# Patient Record
Sex: FEMALE | Race: WHITE | NOT HISPANIC OR LATINO | Employment: UNEMPLOYED | URBAN - METROPOLITAN AREA
[De-identification: names, ages, dates, MRNs, and addresses within clinical notes are randomized per-mention and may not be internally consistent; named-entity substitution may affect disease eponyms.]

---

## 2018-11-29 PROBLEM — S68.119A AMPUTATION OF FINGER: Status: ACTIVE | Noted: 2018-11-29

## 2021-02-03 ENCOUNTER — LAB VISIT (OUTPATIENT)
Dept: LAB | Facility: HOSPITAL | Age: 71
End: 2021-02-03
Attending: EMERGENCY MEDICINE
Payer: MEDICARE

## 2021-02-03 DIAGNOSIS — R53.83 FATIGUE: Primary | ICD-10-CM

## 2021-02-03 DIAGNOSIS — E78.5 HYPERLIPEMIA: ICD-10-CM

## 2021-02-03 DIAGNOSIS — Z79.899 ENCOUNTER FOR LONG-TERM (CURRENT) USE OF OTHER MEDICATIONS: ICD-10-CM

## 2021-02-03 DIAGNOSIS — U07.1 CLINICAL DIAGNOSIS OF SEVERE ACUTE RESPIRATORY SYNDROME CORONAVIRUS 2 (SARS-COV-2) DISEASE: ICD-10-CM

## 2021-02-03 DIAGNOSIS — E66.8 OBESITY OF ENDOCRINE ORIGIN: ICD-10-CM

## 2021-02-03 LAB
BASOPHILS # BLD AUTO: 0.04 K/UL (ref 0–0.2)
BASOPHILS NFR BLD: 0.7 % (ref 0–1.9)
BILIRUB UR QL STRIP: NEGATIVE
CLARITY UR: CLEAR
COLOR UR: YELLOW
DIFFERENTIAL METHOD: ABNORMAL
EOSINOPHIL # BLD AUTO: 0.2 K/UL (ref 0–0.5)
EOSINOPHIL NFR BLD: 3.1 % (ref 0–8)
ERYTHROCYTE [DISTWIDTH] IN BLOOD BY AUTOMATED COUNT: 12 % (ref 11.5–14.5)
ERYTHROCYTE [SEDIMENTATION RATE] IN BLOOD BY WESTERGREN METHOD: 11 MM/HR (ref 0–20)
GLUCOSE UR QL STRIP: NEGATIVE
HCT VFR BLD AUTO: 44.7 % (ref 37–48.5)
HGB BLD-MCNC: 14 G/DL (ref 12–16)
HGB UR QL STRIP: ABNORMAL
IMM GRANULOCYTES # BLD AUTO: 0.01 K/UL (ref 0–0.04)
IMM GRANULOCYTES NFR BLD AUTO: 0.2 % (ref 0–0.5)
KETONES UR QL STRIP: ABNORMAL
LEUKOCYTE ESTERASE UR QL STRIP: NEGATIVE
LYMPHOCYTES # BLD AUTO: 2.6 K/UL (ref 1–4.8)
LYMPHOCYTES NFR BLD: 42.4 % (ref 18–48)
MCH RBC QN AUTO: 30.1 PG (ref 27–31)
MCHC RBC AUTO-ENTMCNC: 31.3 G/DL (ref 32–36)
MCV RBC AUTO: 96 FL (ref 82–98)
MONOCYTES # BLD AUTO: 0.5 K/UL (ref 0.3–1)
MONOCYTES NFR BLD: 8.6 % (ref 4–15)
NEUTROPHILS # BLD AUTO: 2.8 K/UL (ref 1.8–7.7)
NEUTROPHILS NFR BLD: 45 % (ref 38–73)
NITRITE UR QL STRIP: NEGATIVE
NRBC BLD-RTO: 0 /100 WBC
PH UR STRIP: 6 [PH] (ref 5–8)
PLATELET # BLD AUTO: 203 K/UL (ref 150–350)
PMV BLD AUTO: 10.5 FL (ref 9.2–12.9)
PROT UR QL STRIP: NEGATIVE
RBC # BLD AUTO: 4.65 M/UL (ref 4–5.4)
SP GR UR STRIP: >=1.03 (ref 1–1.03)
URN SPEC COLLECT METH UR: ABNORMAL
WBC # BLD AUTO: 6.13 K/UL (ref 3.9–12.7)

## 2021-02-03 PROCEDURE — 84403 ASSAY OF TOTAL TESTOSTERONE: CPT

## 2021-02-03 PROCEDURE — 82306 VITAMIN D 25 HYDROXY: CPT

## 2021-02-03 PROCEDURE — 85651 RBC SED RATE NONAUTOMATED: CPT | Mod: PO

## 2021-02-03 PROCEDURE — 84140 ASSAY OF PREGNENOLONE: CPT

## 2021-02-03 PROCEDURE — 80061 LIPID PANEL: CPT

## 2021-02-03 PROCEDURE — 84482 T3 REVERSE: CPT

## 2021-02-03 PROCEDURE — 85025 COMPLETE CBC W/AUTO DIFF WBC: CPT

## 2021-02-03 PROCEDURE — 83036 HEMOGLOBIN GLYCOSYLATED A1C: CPT

## 2021-02-03 PROCEDURE — 82626 DEHYDROEPIANDROSTERONE: CPT

## 2021-02-03 PROCEDURE — 84443 ASSAY THYROID STIM HORMONE: CPT

## 2021-02-03 PROCEDURE — 82670 ASSAY OF TOTAL ESTRADIOL: CPT

## 2021-02-03 PROCEDURE — 83540 ASSAY OF IRON: CPT

## 2021-02-03 PROCEDURE — 83090 ASSAY OF HOMOCYSTEINE: CPT

## 2021-02-03 PROCEDURE — 82533 TOTAL CORTISOL: CPT

## 2021-02-03 PROCEDURE — 82607 VITAMIN B-12: CPT

## 2021-02-03 PROCEDURE — 83525 ASSAY OF INSULIN: CPT

## 2021-02-03 PROCEDURE — 84144 ASSAY OF PROGESTERONE: CPT

## 2021-02-03 PROCEDURE — 80053 COMPREHEN METABOLIC PANEL: CPT

## 2021-02-03 PROCEDURE — 83002 ASSAY OF GONADOTROPIN (LH): CPT

## 2021-02-03 PROCEDURE — 82679 ASSAY OF ESTRONE: CPT

## 2021-02-03 PROCEDURE — 82746 ASSAY OF FOLIC ACID SERUM: CPT

## 2021-02-03 PROCEDURE — 82728 ASSAY OF FERRITIN: CPT

## 2021-02-03 PROCEDURE — 86800 THYROGLOBULIN ANTIBODY: CPT

## 2021-02-03 PROCEDURE — 83735 ASSAY OF MAGNESIUM: CPT

## 2021-02-03 PROCEDURE — 84439 ASSAY OF FREE THYROXINE: CPT

## 2021-02-03 PROCEDURE — 83001 ASSAY OF GONADOTROPIN (FSH): CPT

## 2021-02-03 PROCEDURE — 86769 SARS-COV-2 COVID-19 ANTIBODY: CPT

## 2021-02-03 PROCEDURE — 84270 ASSAY OF SEX HORMONE GLOBUL: CPT

## 2021-02-03 PROCEDURE — 84481 FREE ASSAY (FT-3): CPT

## 2021-02-03 PROCEDURE — 81003 URINALYSIS AUTO W/O SCOPE: CPT | Mod: PO

## 2021-02-03 PROCEDURE — 84100 ASSAY OF PHOSPHORUS: CPT

## 2021-02-03 PROCEDURE — 86140 C-REACTIVE PROTEIN: CPT

## 2021-02-03 PROCEDURE — 84402 ASSAY OF FREE TESTOSTERONE: CPT

## 2021-02-03 PROCEDURE — 84146 ASSAY OF PROLACTIN: CPT

## 2021-02-03 PROCEDURE — 36415 COLL VENOUS BLD VENIPUNCTURE: CPT | Mod: PO

## 2021-02-04 LAB
25(OH)D3+25(OH)D2 SERPL-MCNC: 26 NG/ML (ref 30–96)
ALBUMIN SERPL BCP-MCNC: 4.1 G/DL (ref 3.5–5.2)
ALP SERPL-CCNC: 83 U/L (ref 55–135)
ALT SERPL W/O P-5'-P-CCNC: 138 U/L (ref 10–44)
ANION GAP SERPL CALC-SCNC: 11 MMOL/L (ref 8–16)
AST SERPL-CCNC: 61 U/L (ref 10–40)
BILIRUB SERPL-MCNC: 0.6 MG/DL (ref 0.1–1)
BUN SERPL-MCNC: 24 MG/DL (ref 8–23)
CALCIUM SERPL-MCNC: 9.1 MG/DL (ref 8.7–10.5)
CHLORIDE SERPL-SCNC: 104 MMOL/L (ref 95–110)
CHOLEST SERPL-MCNC: 318 MG/DL (ref 120–199)
CHOLEST/HDLC SERPL: 3.8 {RATIO} (ref 2–5)
CO2 SERPL-SCNC: 24 MMOL/L (ref 23–29)
CORTIS SERPL-MCNC: 11.1 UG/DL
CREAT SERPL-MCNC: 0.8 MG/DL (ref 0.5–1.4)
CRP SERPL-MCNC: 2.1 MG/L (ref 0–8.2)
EST. GFR  (AFRICAN AMERICAN): >60 ML/MIN/1.73 M^2
EST. GFR  (NON AFRICAN AMERICAN): >60 ML/MIN/1.73 M^2
ESTIMATED AVG GLUCOSE: 120 MG/DL (ref 68–131)
ESTRADIOL SERPL-MCNC: <10 PG/ML
FOLATE SERPL-MCNC: 14.6 NG/ML (ref 4–24)
GLUCOSE SERPL-MCNC: 100 MG/DL (ref 70–110)
HBA1C MFR BLD: 5.8 % (ref 4–5.6)
HCYS SERPL-SCNC: 7.2 UMOL/L (ref 4–15.5)
HDLC SERPL-MCNC: 83 MG/DL (ref 40–75)
HDLC SERPL: 26.1 % (ref 20–50)
INSULIN COLLECTION INTERVAL: NORMAL
INSULIN SERPL-ACNC: 9.3 UU/ML
LDLC SERPL CALC-MCNC: 199.8 MG/DL (ref 63–159)
LH SERPL-ACNC: 21.3 MIU/ML
MAGNESIUM SERPL-MCNC: 2 MG/DL (ref 1.6–2.6)
NONHDLC SERPL-MCNC: 235 MG/DL
PHOSPHATE SERPL-MCNC: 3.4 MG/DL (ref 2.7–4.5)
POTASSIUM SERPL-SCNC: 4.2 MMOL/L (ref 3.5–5.1)
PROGEST SERPL-MCNC: 0.1 NG/ML
PROLACTIN SERPL IA-MCNC: 5.3 NG/ML (ref 5.2–26.5)
PROT SERPL-MCNC: 7.5 G/DL (ref 6–8.4)
SARS-COV-2 IGG SERPLBLD QL IA.RAPID: NEGATIVE
SODIUM SERPL-SCNC: 139 MMOL/L (ref 136–145)
T3FREE SERPL-MCNC: 2.8 PG/ML (ref 2.3–4.2)
T4 FREE SERPL-MCNC: 0.98 NG/DL (ref 0.71–1.51)
TESTOST SERPL-MCNC: 53 NG/DL (ref 5–73)
THYROGLOB AB SERPL IA-ACNC: <4 IU/ML (ref 0–3.9)
THYROPEROXIDASE IGG SERPL-ACNC: <6 IU/ML
TRIGL SERPL-MCNC: 176 MG/DL (ref 30–150)
TSH SERPL DL<=0.005 MIU/L-ACNC: 3.4 UIU/ML (ref 0.4–4)
VIT B12 SERPL-MCNC: 295 PG/ML (ref 210–950)

## 2021-02-05 LAB
FERRITIN SERPL-MCNC: 149 NG/ML (ref 20–300)
FSH SERPL-ACNC: 99.7 MIU/ML
IRON SERPL-MCNC: 114 UG/DL (ref 30–160)

## 2021-02-06 LAB — ESTRONE SERPL-MCNC: 33 PG/ML

## 2021-02-07 LAB — DHEA SERPL-MCNC: 1.96 NG/ML (ref 0.63–4.7)

## 2021-02-08 LAB — SHBG SERPL-SCNC: 45 NMOL/L

## 2021-02-09 LAB
PREG SERPL-MCNC: 66 NG/DL (ref 33–248)
T3REVERSE SERPL-MCNC: 15.3 NG/DL (ref 9–27)
TESTOST FREE SERPL-MCNC: 1.2 PG/ML

## 2023-02-17 DIAGNOSIS — D05.12 DUCTAL CARCINOMA IN SITU (DCIS) OF LEFT BREAST: Primary | ICD-10-CM

## 2023-02-22 ENCOUNTER — TELEPHONE (OUTPATIENT)
Dept: SURGERY | Facility: CLINIC | Age: 73
End: 2023-02-22
Payer: MEDICARE

## 2023-02-22 NOTE — NURSING
Called the patient and introduced myself and told her about navigation services.  Told her about genetic testing and asked her if she is interested.  She definitely is interested and will come by tomorrow before her breast mri.  Instructed her to not eat or drink anything for 30 minutes before giving us the sample.  She verbalized understanding.

## 2023-02-23 NOTE — PROGRESS NOTES
[facility] - Breast Surgery   History and Physical    Subjective:      Margaret Rouse is a 72 y.o. female     Pt was not having any sxs. Was found on scr MMG.    No longer on plavix, only took for a few days, last year was the last time 21  HRT pellets: Testosterone started 2021, last placed 2023  On prometrium  Will stop all HRT      No tobacco use anymore, understands the importance of no tobacco.         Menarche at age 14yrs old. . Age at first live birth 25yrs old. did breast feed for 12 months each.   Menopause at 55yrs old. HRT-Prometrium and testosterone pellets. She has been on hormone pellets since 21, last placed 2023     No personal history of ovarian/breast. Denies previous breast biopsy. Genetic testing- collected last week    Family history cancer: Sister with Follicular Lymphoma, Maternal Uncle with Pancreatic, 2 Maternal male cousins with Colon    Smoker Pk/yr quit date: Occasionally over the last few years when they go out  Covid-19 vaccine x 2    Relevant medical history:  2021  Left heart cath with Dr. Castrejon EF 65%, nonobstructive CAD, no wall motion abn    Relevant info: Had breast cyst drained 13 years ago, can not remember which breast.  Srinivasa attending        Patient Active Problem List   Diagnosis    Palpitations    Amputation of finger    Invasive ductal carcinoma of left breast in female     Current Outpatient Medications on File Prior to Visit   Medication Sig Dispense Refill    ARMOUR THYROID 30 mg Tab once daily.      aspirin (ECOTRIN) 81 MG EC tablet Take 81 mg by mouth once daily.      cyanocobalamin, vitamin B-12, 3,000 mcg Cap Take by mouth once daily. Triple Blend with folate 680mcg      DIINDOLYLMETHANE, BULK, MISC 100 mg by Misc.(Non-Drug; Combo Route) route once daily.      fluticasone propionate (FLONASE) 50 mcg/actuation nasal spray 1 spray by Each Nostril route once daily.      KRILL OIL ORAL Take 2,000 mg by  mouth once daily.      losartan (COZAAR) 25 MG tablet once daily.      metFORMIN (GLUCOPHAGE) 500 MG tablet Take 500 mg by mouth 2 (two) times daily.      progesterone (PROMETRIUM) 200 MG capsule Take 200 mg by mouth once daily.      rosuvastatin (CRESTOR) 20 MG tablet every evening.      VITAMIN A ORAL Take 5,000 Int'l Units/L by mouth once daily.      vitamin K2 45 mcg Cap Take by mouth once daily.      zinc gluconate 50 mg tablet Take 25 mg by mouth once daily.      diazePAM (VALIUM) 5 MG tablet Take 1 tablet (5 mg total) by mouth once. for 1 dose 2 tablet 0    [DISCONTINUED] clopidogreL (PLAVIX) 75 mg tablet once daily.      [DISCONTINUED] MILK THISTLE ORAL Take 7,500 mcg by mouth once daily.       No current facility-administered medications on file prior to visit.     Review of patient's allergies indicates:  No Known Allergies  Past Medical History:   Diagnosis Date    Abnormal results of liver function studies     Chest pain     Fatigue     H/O: pneumonia     History of chicken pox     Hyperlipidemia, mixed     Hypertension     Menopausal syndrome     Palpitations 12/15/2015     Past Surgical History:   Procedure Laterality Date    CATARACT EXTRACTION W/ INTRAOCULAR LENS  IMPLANT, BILATERAL      COLONOSCOPY      COSMETIC SURGERY      DEBRIDEMENT AND CLOSURE OF WOUND OF FINGER Left 11/29/2018    Procedure: DEBRIDEMENT, WOUND, FINGER, WITH CLOSURE, VY FLap;  Surgeon: Frederic Barbour MD;  Location: [facility] OR;  Service: Plastics;  Laterality: Left;    KNEE SURGERY Right 2015    LEFT HEART CATHETERIZATION Left 11/18/2021    Procedure: CATHETERIZATION, HEART, LEFT;  Surgeon: Basim Castrejon MD;  Location: [facility] CATH;  Service: Cardiology;  Laterality: Left;    TONSILLECTOMY  1955     Family History   Problem Relation Age of Onset    No Known Problems Mother     Heart disease Father     Heart attack Father         at age 64    Cancer Sister         folicular non-hodgkins lymphoma    Lymphoma Sister     Diabetes  "Maternal Grandmother      Social History     Socioeconomic History    Marital status:    Tobacco Use    Smoking status: Former    Smokeless tobacco: Never    Tobacco comments:     50 years ago   Substance and Sexual Activity    Alcohol use: No     Comment: rarely    Drug use: No         Review of Systems    No CP, No SOB      Objective:      BP (!) 161/72 (BP Location: Left arm, Patient Position: Sitting, BP Method: Medium (Automatic))   Pulse 93   Temp 99 °F (37.2 °C) (Temporal)   Resp 18   Ht 5' 6" (1.676 m)   Wt 70.2 kg (154 lb 12.2 oz)   SpO2 97%   BMI 24.98 kg/m²     Constitutional: NAD AAOX4  HEENT: EOMI, nonicteric sclera  NECK: no mass, no thyromegaly, no lymphadenopathy  CV: regular rate  PULM: good respiratory effort  ABDOMEN: soft, Nontender, nondistended. No guarding. No rebound.  MUSCULOSKELETAL: Good ROM  SKIN: No rashes or ulcerations seen.   NEUROLOGIC: CN grossly intact. Motor, sensory grossly intact    Breast exam   D considerable ptosis  Right: No mass, discharge, dimpling, lymphadenopathy  Left:  No discharge, lymphadenopathy  Post biopsy, mild bruise  Left UOQ subtle thickness 4cm FN, 3x2cm, puckering at biopsy site  No LN appreciated          Patient Active Problem List   Diagnosis    Palpitations    Amputation of finger    Invasive ductal carcinoma of left breast in female        High complexity of problems addressed - breast cancer, treatment options, expectations, effects of treatment, risks, benefits. Extensive and complex data reviewed, independent interpretation of tests, discussion of management with another health care provider.    Alternative efficacious methods of treatment of breast cancer were given.  Options including lumpectomy, mastectomy, reconstruction options with advantages/disadvantages of each, provisions assuring coverage of reconstructive surgery costs, how the patient may access reconstructive care including the potential to be transferred to a facility " that provides reconstructive care or choosing reconstruction after completion of breast cancer surgery and treatment.  LDH, LCLTF breast cancer brochure given.    I have given her all options - lumpectomy XRT, unilateral or bilateral mastectomy +/- reconstruction. I have explained procedure, risks, benefits, postop expectations. All questions answered. Risks include but are not limited to infection, bleeding, injury to nerves, vessels, numbness, weakness, difficulty lifting arm, swelling of arm (lymphedema), inability to remove all lesion, residual breast tissue, recurrence of malignancy, need for further procedure, loss of skin flap, seroma (fluid collection), inability to find sentinel lymph node, need for axillary dissection, chronic pain, radioactive substance may be given, allergic reaction, staining of the skin, difficulty with future imaging.    Saw in Multi Disciplinary clinic with Dr Hannah medical oncologist and Dr Madrid radiation oncologist.        Imaging and Chronology:     2/4/2022 Bilateral Screening Mammo - [facility]   No mammographic evidence of malignancy.       2/07/2023 Bilateral Screening Mammo - [facility]   Left calcifications at the upper outer posterior position.   Left breast asymmetry at the outer middle position.     Right breast asymmetry at the outer middle position.           2/09/2023 Bilateral Dx Mammo, Left US complete - [facility]   Left Pleomorphic calcifications in the upper outer posterior breast spanning 25 mm on the CC magnification view.  Left smaller group of similar calcifications about 20 mm anterosuperior to the first group spanning 5 mm.    Right UOQ mid asymmetry does not persist    No abn left LN      2/13/2023 Left Dx Mammo, Left Stereotactic Biopsy - [facility]  MERRILL  DCIS, COMEDO TYPE, WITH A 0.1 CM FOCUS OF INVASION  This is malignant and concordant with imaging findings    ER Low Positive 5.4 %  NE Negative 0.1 %  Insufficient tissue for Her 2      2/23/2023 MRI Breast - 2/23/2023 [facility]  Left UOQ  mid-posterior discontiguous, clumped, non mass enhancement in a segmental pattern spanning 31 mm x 12 mm x 29 mm.   12mm from lateral skin, 29mm from pec  There are subcentimeter satellites inferiorly, including 16mm antinf 6mm mass, nipple clear  Right neg  Nml LN    Genetics pending        Assessment/Plan:      Cancer Staging   Invasive ductal carcinoma of left breast in female  Staging form: Breast, AJCC 8th Edition  - Clinical stage from 2/27/2023: cT1mi, cN0, cM0, G3, ER+, TX-, HER2: Not Assessed - Signed by Amanda Mcclellan MD on 2/27/2023    Left UOQ mid post 3.1cm area with subcm satellites  Nml LN, confirmed with rad  Grade 3 DCIS with microinv IDC  ER 5 nearly neg TX neg, unable to get Her2    Case reviewed at tumor conf. No benefit to rebiopsy, solid areas sub cm in size. LN appear neg. Conveyed to pt.     Discussed surgical options, discussed 3cm area with satellite areas.    She wishes to have left mastectomy, left SLNB, recon    Will meet with erica    Discussed NAC does not appear involved by imaging but does have considerable ptosis - options to be discussed with recon team    No signif disease on last cath - cardiac clearance    Has stopped HRT. Will not get additional testosterone pellets  Does not want right mastectomy at this time.     Genetics pending

## 2023-02-27 ENCOUNTER — OFFICE VISIT (OUTPATIENT)
Dept: HEMATOLOGY/ONCOLOGY | Facility: CLINIC | Age: 73
End: 2023-02-27
Payer: MEDICARE

## 2023-02-27 ENCOUNTER — TUMOR BOARD CONFERENCE (OUTPATIENT)
Dept: SURGERY | Facility: CLINIC | Age: 73
End: 2023-02-27

## 2023-02-27 ENCOUNTER — OFFICE VISIT (OUTPATIENT)
Dept: RADIATION ONCOLOGY | Facility: CLINIC | Age: 73
End: 2023-02-27
Payer: MEDICARE

## 2023-02-27 ENCOUNTER — OFFICE VISIT (OUTPATIENT)
Dept: SURGERY | Facility: CLINIC | Age: 73
End: 2023-02-27
Payer: MEDICARE

## 2023-02-27 VITALS
WEIGHT: 154.75 LBS | BODY MASS INDEX: 24.87 KG/M2 | RESPIRATION RATE: 18 BRPM | HEIGHT: 66 IN | HEART RATE: 93 BPM | DIASTOLIC BLOOD PRESSURE: 72 MMHG | TEMPERATURE: 99 F | SYSTOLIC BLOOD PRESSURE: 161 MMHG | OXYGEN SATURATION: 97 %

## 2023-02-27 DIAGNOSIS — D05.12 DUCTAL CARCINOMA IN SITU (DCIS) OF LEFT BREAST: ICD-10-CM

## 2023-02-27 DIAGNOSIS — C50.912 INVASIVE DUCTAL CARCINOMA OF LEFT BREAST IN FEMALE: Primary | ICD-10-CM

## 2023-02-27 DIAGNOSIS — C50.919 INFILTRATING DUCTAL CARCINOMA OF BREAST, UNSPECIFIED LATERALITY: ICD-10-CM

## 2023-02-27 PROCEDURE — 99999 PR PBB SHADOW E&M-EST. PATIENT-LVL V: ICD-10-PCS | Mod: PBBFAC,,, | Performed by: SURGERY

## 2023-02-27 PROCEDURE — 99999 PR PBB SHADOW E&M-EST. PATIENT-LVL II: ICD-10-PCS | Mod: PBBFAC,,, | Performed by: RADIOLOGY

## 2023-02-27 PROCEDURE — 99999 PR PBB SHADOW E&M-EST. PATIENT-LVL V: CPT | Mod: PBBFAC,,, | Performed by: SURGERY

## 2023-02-27 PROCEDURE — 99999 PR PBB SHADOW E&M-EST. PATIENT-LVL II: ICD-10-PCS | Mod: PBBFAC,,, | Performed by: STUDENT IN AN ORGANIZED HEALTH CARE EDUCATION/TRAINING PROGRAM

## 2023-02-27 PROCEDURE — 99205 PR OFFICE/OUTPT VISIT, NEW, LEVL V, 60-74 MIN: ICD-10-PCS | Mod: S$PBB,,, | Performed by: STUDENT IN AN ORGANIZED HEALTH CARE EDUCATION/TRAINING PROGRAM

## 2023-02-27 PROCEDURE — 99999 PR PBB SHADOW E&M-EST. PATIENT-LVL II: CPT | Mod: PBBFAC,,, | Performed by: STUDENT IN AN ORGANIZED HEALTH CARE EDUCATION/TRAINING PROGRAM

## 2023-02-27 PROCEDURE — 99999 PR PBB SHADOW E&M-EST. PATIENT-LVL II: CPT | Mod: PBBFAC,,, | Performed by: RADIOLOGY

## 2023-02-27 PROCEDURE — 99205 OFFICE O/P NEW HI 60 MIN: CPT | Mod: S$PBB,,, | Performed by: STUDENT IN AN ORGANIZED HEALTH CARE EDUCATION/TRAINING PROGRAM

## 2023-02-27 PROCEDURE — 99205 OFFICE O/P NEW HI 60 MIN: CPT | Mod: S$PBB,,, | Performed by: RADIOLOGY

## 2023-02-27 PROCEDURE — 99205 PR OFFICE/OUTPT VISIT, NEW, LEVL V, 60-74 MIN: ICD-10-PCS | Mod: S$PBB,,, | Performed by: RADIOLOGY

## 2023-02-27 PROCEDURE — 99212 OFFICE O/P EST SF 10 MIN: CPT | Mod: PBBFAC,25,27,PN | Performed by: RADIOLOGY

## 2023-02-27 PROCEDURE — 99205 PR OFFICE/OUTPT VISIT, NEW, LEVL V, 60-74 MIN: ICD-10-PCS | Mod: S$PBB,,, | Performed by: SURGERY

## 2023-02-27 PROCEDURE — 99205 OFFICE O/P NEW HI 60 MIN: CPT | Mod: S$PBB,,, | Performed by: SURGERY

## 2023-02-27 PROCEDURE — 99215 OFFICE O/P EST HI 40 MIN: CPT | Mod: PBBFAC,25,PN,27 | Performed by: SURGERY

## 2023-02-27 PROCEDURE — 99212 OFFICE O/P EST SF 10 MIN: CPT | Mod: PBBFAC,27,PN | Performed by: STUDENT IN AN ORGANIZED HEALTH CARE EDUCATION/TRAINING PROGRAM

## 2023-02-27 NOTE — NURSING
Met with the patient to discuss what the patient has learned thus far about her diagnosis and answer all questions.  Follow-up appts made and a folder with NCCN patient guidelines book on Invasive/Noninvasive Breast Cancer given to her along with copies of her imaging, biopsy, and pathology reports. Also given a copy of the [facility] Board of Medical [employer]ers brochure on Introductory Guide to Breast Cancer Treatment Options.  Contact information given to her for nurse navigation and she was told to call for any questions or concerns.  She verbalized understanding.  Consents signed for left mastectomy with left sentinel node biopsy.  Post op instructions given both verbally and written.  She verbalized understanding.  Told her pre op nurse appt information and told her location and phone number to the OPP.  She will call once she has a surgery date.  Referral placed for plastic surgery and Prehab.  All paperwork to be faxed to Dr Bro's and Dr Forman's (if he does surgery on the [facility]).  Message sent to Dr Bro's nurse.

## 2023-02-27 NOTE — PROGRESS NOTES
02/27/2023    [facility]   Radiation Oncology Consultation    ASSESSMENT  This is a 72 y.o. 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 She is seen as part of Multidisciplinary Breast Clinic prior to initiation of therapy for discussion of treatment options.       PLAN    Treatment options were discussed with the patient including mastectomy vs breast conservation with lumpectomy and adjuvant RT.  We discussed the goals of treatment to be curative.  The risks, benefits, scheduling, alternatives to and rationale of radiation therapy were explained in detail.   We discussed the indications, course, and potential risks associated with radiation therapy, including but not limited to fatigue, local skin reaction such as hyperpigmentation, tenderness or erythema, breast pain, and potential long term toxicities such as rib fragility, and remote risks of lung inflammation, esophageal inflammation, heart inflammation, or secondary malignancy.  She expressed understanding of these risks, all questions were answered to her apparent satisfaction.   After this discussion, she elected to proceed with mastectomy  She was given our contact information, and she was told that she could call our clinic at any time if she has any questions or concerns.      Radiation Treatment Details:   No Radiation therapy    Chief Complaint   Patient presents with    Breast Cancer       HPI: The patient is a 72 y.o. year old female with a new diagnosis of breast cancer. She initially presented due to abnormal mammogram.     2/7/23 Screening mammogram:  left calcifications at the UO posterior and asymmetry in the outer region; Right asymmetry in the outer region    2/9/23 Diagnostic mammogram/ultrasound: Right-no presistent abnormality.  Left-new pleomorphic Ca++ in the UO posterior breast  spanning 2.5cm, smaller group of Ca++ 2.0cm anterior to first spanning 5mm    2/13/23 LEFR upper outer posterior biopsy: ductal carcinoma in-situ, comedo type, 0.1cm focus of invasion, ER+/GA-/Her2 unable to assess    2/23/23 MRI Breasts: Left upper outer quadrant posterior depth discontiguous, clumped non-mass enhancement with associated clip 3.1 x 1.2 x 2.9cm with sub-cm satellites inferiorly up to 6mm at 1.6cm inferior.  No abnormal SARAH    Possibility of pregnancy: No  History of prior irradiation: No  History of prior systemic anti-cancer therapy: No  History of collagen vascular disease: No  Implanted electronic device (pacer/defib/nerve stimulator): No      Past Medical History:   Diagnosis Date    Abnormal results of liver function studies     Chest pain     Fatigue     H/O: pneumonia     History of chicken pox     Hyperlipidemia, mixed     Hypertension     Menopausal syndrome     Palpitations 12/15/2015       Past Surgical History:   Procedure Laterality Date    CATARACT EXTRACTION W/ INTRAOCULAR LENS  IMPLANT, BILATERAL      COLONOSCOPY      COSMETIC SURGERY      DEBRIDEMENT AND CLOSURE OF WOUND OF FINGER Left 11/29/2018    Procedure: DEBRIDEMENT, WOUND, FINGER, WITH CLOSURE, VY FLap;  Surgeon: Frederic Barbour MD;  Location: [facility] OR;  Service: Plastics;  Laterality: Left;    KNEE SURGERY Right 2015    LEFT HEART CATHETERIZATION Left 11/18/2021    Procedure: CATHETERIZATION, HEART, LEFT;  Surgeon: Basim Castrejon MD;  Location: [facility] CATH;  Service: Cardiology;  Laterality: Left;    TONSILLECTOMY  1955       Social History     Tobacco Use    Smoking status: Former    Smokeless tobacco: Never    Tobacco comments:     50 years ago   Substance Use Topics    Alcohol use: No     Comment: rarely    Drug use: No       Cancer-related family history includes Cancer in her sister; Lymphoma in her sister.    Current Outpatient Medications on File Prior to Visit   Medication Sig Dispense Refill    ARMOUR THYROID 30 mg  Tab once daily.      aspirin (ECOTRIN) 81 MG EC tablet Take 81 mg by mouth once daily.      cyanocobalamin, vitamin B-12, 3,000 mcg Cap Take by mouth once daily. Triple Blend with folate 680mcg      diazePAM (VALIUM) 5 MG tablet Take 1 tablet (5 mg total) by mouth once. for 1 dose 2 tablet 0    DIINDOLYLMETHANE, BULK, MISC 100 mg by Misc.(Non-Drug; Combo Route) route once daily.      fluticasone propionate (FLONASE) 50 mcg/actuation nasal spray 1 spray by Each Nostril route once daily.      KRILL OIL ORAL Take 2,000 mg by mouth once daily.      losartan (COZAAR) 25 MG tablet once daily.      metFORMIN (GLUCOPHAGE) 500 MG tablet Take 500 mg by mouth 2 (two) times daily.      progesterone (PROMETRIUM) 200 MG capsule Take 200 mg by mouth once daily.      rosuvastatin (CRESTOR) 20 MG tablet every evening.      VITAMIN A ORAL Take 5,000 Int'l Units/L by mouth once daily.      vitamin K2 45 mcg Cap Take by mouth once daily.      zinc gluconate 50 mg tablet Take 25 mg by mouth once daily.      [DISCONTINUED] clopidogreL (PLAVIX) 75 mg tablet once daily.      [DISCONTINUED] MILK THISTLE ORAL Take 7,500 mcg by mouth once daily.       No current facility-administered medications on file prior to visit.       Review of patient's allergies indicates:  No Known Allergies    Review of Systems   Constitutional:  Positive for malaise/fatigue (getting tired more easily).   HENT:  Negative for hearing loss.    Eyes:  Negative for double vision.   Gastrointestinal:  Positive for heartburn (believes 2/2 anxiety).   Musculoskeletal:  Negative for back pain.   Neurological:  Negative for dizziness, sensory change, focal weakness and headaches.   Psychiatric/Behavioral:  Negative for depression. The patient is nervous/anxious.       Vital Signs: There were no vitals taken for this visit.    ECOG Performance Status: 0 - Fully Active    Physical Exam  Vitals reviewed.   Constitutional:       General: She is not in acute distress.      Appearance: Normal appearance. She is not ill-appearing or toxic-appearing.   HENT:      Head: Normocephalic and atraumatic.      Nose: Nose normal.   Eyes:      Extraocular Movements: Extraocular movements intact.      Conjunctiva/sclera: Conjunctivae normal.      Pupils: Pupils are equal, round, and reactive to light.   Pulmonary:      Effort: Pulmonary effort is normal.   Chest:   Breasts:     Right: Normal.       Musculoskeletal:         General: Normal range of motion.      Cervical back: Normal range of motion.   Lymphadenopathy:      Upper Body:      Right upper body: No supraclavicular, axillary or pectoral adenopathy.      Left upper body: No supraclavicular, axillary or pectoral adenopathy.   Skin:     General: Skin is warm.   Neurological:      General: No focal deficit present.      Mental Status: She is alert and oriented to person, place, and time.      Cranial Nerves: No cranial nerve deficit.      Gait: Gait normal.   Psychiatric:         Mood and Affect: Mood normal.         Behavior: Behavior normal.         Thought Content: Thought content normal.         Judgment: Judgment normal.          Imaging: I have personally reviewed the patient's available images and reports and summarized pertinent findings above in HPI.     Pathology: I have personally reviewed the patient's available pathology and summarized pertinent findings above in HPI.     This case was discussed with Dr. Mcclellan      - Thank you for allowing me to participate in the care of your patient.    Aneta Madrid MD

## 2023-02-27 NOTE — NURSING
Cardiac clearance faxed to Dr Mamadou Duque's office.  Called and spoke with Hannah who will give to his nurse.  Spoke with the patient and asked her if she is still on ASA 81mg.  She stopped over a year ago.  She said no doctor prescribed it, just took on her own.  Asked her to not start it right now and also to stop her krill oil for now.  She verbalized understanding.

## 2023-02-27 NOTE — NURSING
Spoke with the patient about lymphedema therapy and why we recommend it.  She is agreeable to an appt with the therapist.  Also spoke with her about Integrative Oncology and the services they provide, as well as classes offered at the Cancer Center.  She is very open to it and said she will call us if she wants to do any of the services.

## 2023-02-28 ENCOUNTER — TELEPHONE (OUTPATIENT)
Dept: HEMATOLOGY/ONCOLOGY | Facility: CLINIC | Age: 73
End: 2023-02-28
Payer: MEDICARE

## 2023-02-28 NOTE — NURSING
Met with patient and her  in multi disciplinary clinic today.  Explained my role as navigator.  Reviewed plan of care and answered questions.  My contact information was provided and pt was encouraged to call with any questions or concerns.  Pt and  verbalized an understanding.

## 2023-02-28 NOTE — PROGRESS NOTES
Interdisciplinary Breast Cancer Conference    Margaret Rouse    Female    Date Presented to Tumor Board: 02/27/23    Presenting Hospital / Clinic: [facility]    Tumor Laterality: Left    Breast Tumor Site: UOQ    Presenter: Amanda Mcclellan    Reason for Consultation: Initial Presentation    Specialties Present: Medical Oncology;Radiation Oncology;Surgical Oncology;Pathology;Navigation;Research;Integrative Oncology;Radiology    Patient Status: a new patient    Treatment to Date: None    Clinical Trial Eligibility: None available    ER: Positive (weakly positive)    MO: Negative         Cancer Staging   Invasive ductal carcinoma of left breast in female  Staging form: Breast, AJCC 8th Edition  - Clinical stage from 2/27/2023: cT1mi, cN0, cM0, G3, ER+, MO-, HER2: Not Assessed - Signed by Amanda Mcclellan MD on 2/27/2023      Recommended Therapy:  Genetic testing  Surgery  Radiation  Endocrine Therapy  Recommendations pending final path    Leaning towards left mastectomy with left sentinel node, wants to discuss with plastic surgeon.       Presentation at Cancer Conference: Prospective

## 2023-03-02 ENCOUNTER — TELEPHONE (OUTPATIENT)
Dept: HEMATOLOGY/ONCOLOGY | Facility: CLINIC | Age: 73
End: 2023-03-02
Payer: MEDICARE

## 2023-03-02 ENCOUNTER — TELEPHONE (OUTPATIENT)
Dept: SURGERY | Facility: CLINIC | Age: 73
End: 2023-03-02
Payer: MEDICARE

## 2023-03-02 ENCOUNTER — PATIENT MESSAGE (OUTPATIENT)
Dept: HEMATOLOGY/ONCOLOGY | Facility: CLINIC | Age: 73
End: 2023-03-02
Payer: MEDICARE

## 2023-03-02 DIAGNOSIS — C50.912 INVASIVE DUCTAL CARCINOMA OF LEFT BREAST IN FEMALE: Primary | ICD-10-CM

## 2023-03-02 NOTE — TELEPHONE ENCOUNTER
Called the patient to let her know that her cardiologist has sent in her clearance for surgery.  She states she has not heard from Dr Bro's office yet for an appt.  Told her we will call them today.  She states she made her appt through [employer] for a lymphedema appt.  Looked at the appt and saw it was made with OT and not a certified lymphedema specialist.  Told her Dr Mcclellan prefers to have her patients see the certified lymphedema therapist.  She was agreeable to us cancelling that appt and having Stefan call her to set up with the correct type therapist.  She will be expecting their call.

## 2023-03-02 NOTE — TELEPHONE ENCOUNTER
I spoke with the patient and informed her of the need to schedule a pre/post op PT appt, recommended by Dr. Mcclellan. I explained what the appt is designed to treat and what the consult will consist of. She voiced understanding and verbalized acceptance of appt. Location was reviewed and message sent to portal if she has any further questions.

## 2023-03-05 PROBLEM — D05.12 DUCTAL CARCINOMA IN SITU (DCIS) OF LEFT BREAST: Status: ACTIVE | Noted: 2023-03-05

## 2023-03-05 NOTE — PROGRESS NOTES
Subjective:                                                                                    Consult note   Patient ID:    Name: Margaret Rouse  : 1950  MRN: [id number]      HPI:   Margaret Rouse is a 72 y.o. female presents for evaluation of Breast Cancer    Patient was seen for a newly diagnosed stage I, breast lesion was seen by a screening mammogram:  23 MRI Breasts: clumped non-mass enhancement with associated clip 3.1 x 1.2 x 2.9cm with sub-cm satellites inferiorly up to 6mm at 1.6cm inferior.  No abnormal SARAH. Biopsy positive for IDC/DCIS, ER; low positive, SD: negative, no HER-2 since was insufficient invasive tumor for IDC and was run on the DCIS part.     Today, patient presented with her family, we discussed that we might need further biopsy to find out the final ER/SD/HER-2 of her invasive part vs possible going to surgery upfront and re-assess afterwards,. Patient denies any symptoms related to her beast.     Oncology History   Invasive ductal carcinoma of left breast in female   2023 Initial Diagnosis    Invasive ductal carcinoma of left breast in female     2023 Cancer Staged    Staging form: Breast, AJCC 8th Edition  - Clinical stage from 2023: cT1mi, cN0, cM0, G3, ER+, SD-, HER2: Not Assessed              Past Medical History:   Diagnosis Date    Abnormal results of liver function studies     Chest pain     Fatigue     H/O: pneumonia     History of chicken pox     Hyperlipidemia, mixed     Hypertension     Menopausal syndrome     Palpitations 12/15/2015       Past Surgical History:   Procedure Laterality Date    CATARACT EXTRACTION W/ INTRAOCULAR LENS  IMPLANT, BILATERAL      COLONOSCOPY      COSMETIC SURGERY      DEBRIDEMENT AND CLOSURE OF WOUND OF FINGER Left 2018    Procedure: DEBRIDEMENT, WOUND, FINGER, WITH CLOSURE, VY FLap;  Surgeon: Frederic Barbour MD;  Location: [facility];  Service: Plastics;  Laterality: Left;    KNEE SURGERY Right      LEFT HEART CATHETERIZATION Left 11/18/2021    Procedure: CATHETERIZATION, HEART, LEFT;  Surgeon: Basim Castrejon MD;  Location: [facility] C[facility];  Service: Cardiology;  Laterality: Left;    TONSILLECTOMY  1955       Family History   Problem Relation Age of Onset    No Known Problems Mother     Heart disease Father     Heart attack Father         at age 64    Cancer Sister         folicular non-hodgkins lymphoma    Lymphoma Sister     Diabetes Maternal Grandmother        Social History     Socioeconomic History    Marital status:    Tobacco Use    Smoking status: Former    Smokeless tobacco: Never    Tobacco comments:     50 years ago   Substance and Sexual Activity    Alcohol use: No     Comment: rarely    Drug use: No       Review of patient's allergies indicates:  No Known Allergies    Review of Systems   Constitutional: Negative for chills, decreased appetite and weight loss.   Eyes:  Negative for visual disturbance.   Cardiovascular:  Negative for chest pain.   Respiratory:  Negative for cough and shortness of breath.    Hematologic/Lymphatic: Negative for adenopathy.   Skin:  Negative for rash.   Musculoskeletal:  Negative for back pain.   Gastrointestinal:  Negative for abdominal pain and diarrhea.   Genitourinary:  Negative for frequency.   Neurological:  Negative for headaches.   Psychiatric/Behavioral:  The patient is nervous/anxious.           Objective:   There were no vitals filed for this visit.     Physical Exam  Constitutional:       Appearance: Normal appearance.   Eyes:      General: No scleral icterus.  Cardiovascular:      Rate and Rhythm: Normal rate.   Pulmonary:      Effort: No respiratory distress.      Breath sounds: Normal breath sounds.   Abdominal:      General: There is no distension.   Musculoskeletal:         General: No swelling.      Cervical back: No rigidity.   Skin:     Coloration: Skin is not jaundiced.   Neurological:      General: No focal deficit present.   Psychiatric:          Behavior: Behavior normal.             Current Outpatient Medications on File Prior to Visit   Medication Sig    ARMOUR THYROID 30 mg Tab once daily.    aspirin (ECOTRIN) 81 MG EC tablet Take 81 mg by mouth once daily.    cyanocobalamin, vitamin B-12, 3,000 mcg Cap Take by mouth once daily. Triple Blend with folate 680mcg    diazePAM (VALIUM) 5 MG tablet Take 1 tablet (5 mg total) by mouth once. for 1 dose    DIINDOLYLMETHANE, BULK, MISC 100 mg by Misc.(Non-Drug; Combo Route) route once daily.    fluticasone propionate (FLONASE) 50 mcg/actuation nasal spray 1 spray by Each Nostril route once daily.    KRILL OIL ORAL Take 2,000 mg by mouth once daily.    losartan (COZAAR) 25 MG tablet once daily.    metFORMIN (GLUCOPHAGE) 500 MG tablet Take 500 mg by mouth 2 (two) times daily.    progesterone (PROMETRIUM) 200 MG capsule Take 200 mg by mouth once daily.    rosuvastatin (CRESTOR) 20 MG tablet every evening.    VITAMIN A ORAL Take 5,000 Int'l Units/L by mouth once daily.    vitamin K2 45 mcg Cap Take by mouth once daily.    zinc gluconate 50 mg tablet Take 25 mg by mouth once daily.     No current facility-administered medications on file prior to visit.       CBC:  Lab Results   Component Value Date    WBC 7.82 07/01/2022    HGB 14.9 07/01/2022    HCT 45.4 07/01/2022    MCV 96 07/01/2022     07/01/2022         CMP:  Sodium   Date Value Ref Range Status   07/01/2022 136 136 - 145 mmol/L Final     Potassium   Date Value Ref Range Status   07/01/2022 4.8 3.5 - 5.1 mmol/L Final     Chloride   Date Value Ref Range Status   07/01/2022 103 95 - 110 mmol/L Final     CO2   Date Value Ref Range Status   07/01/2022 29 22 - 31 mmol/L Final     Glucose   Date Value Ref Range Status   07/01/2022 97 70 - 110 mg/dL Final     Comment:     The ADA recommends the following guidelines for fasting glucose:    Normal:       less than 100 mg/dL    Prediabetes:  100 mg/dL to 125 mg/dL    Diabetes:     126 mg/dL or higher       BUN  "  Date Value Ref Range Status   07/01/2022 22 (H) 7 - 18 mg/dL Final     Creatinine   Date Value Ref Range Status   07/01/2022 0.78 0.50 - 1.40 mg/dL Final     Calcium   Date Value Ref Range Status   07/01/2022 9.2 8.4 - 10.2 mg/dL Final     Total Protein   Date Value Ref Range Status   07/01/2022 7.2 6.0 - 8.4 g/dL Final     Albumin   Date Value Ref Range Status   07/01/2022 4.3 3.5 - 5.2 g/dL Final     Total Bilirubin   Date Value Ref Range Status   07/01/2022 0.4 0.2 - 1.3 mg/dL Final     Alkaline Phosphatase   Date Value Ref Range Status   07/01/2022 45 38 - 145 U/L Final     AST ([place])   Date Value Ref Range Status   12/28/2015 41 (H) 14 - 36 U/L Final     AST   Date Value Ref Range Status   07/01/2022 32 14 - 36 U/L Final     ALT   Date Value Ref Range Status   07/01/2022 23 0 - 35 U/L Final     Anion Gap   Date Value Ref Range Status   07/01/2022 4 (L) 8 - 16 mmol/L Final     eGFR if    Date Value Ref Range Status   07/01/2022 >60 >60 mL/min/1.73 m^2 Final     eGFR if non    Date Value Ref Range Status   07/01/2022 >60 >60 mL/min/1.73 m^2 Final     Comment:     Calculation used to obtain the estimated glomerular filtration  rate (eGFR) is the CKD-EPI equation.          MRI Breast w/wo Contrast, w/CAD, Bilateral  Narrative: Result:   MRI Breast w/wo Contrast, w/CAD, Bilateral     History:  Patient is 72 y.o. and was called back from a screening mammogram for new   calcifications seen in the upper outer quadrant of the left breast in the   posterior depth.    Diagnostic workup 2/9/23 described " new, pleomorphic calcifications in   the left upper outer posterior breast spanning 25 mm on the CC   magnification view.  There is another smaller group of similar   calcifications about 20 mm anterosuperior to the first group spanning 5   mm."    Stereotactic biopsy pathology 2/13/23:  LEFT UPPER OUTER POSTERIOR BREAST CORE NEEDLE BIOPSIES:   - IN SITU DUCT CARCINOMA, COMEDO " TYPE, WITH A 0.1 CM FOCUS OF INVASION.   ER: LOW POSITIVE, SC: NEGATIVE     COMMENT: INSUFFICIENT INVASIVE TUMOR FOR BREAST PANEL TESTING. RESULTS   REPORTED ARE FOR IN SITU CARCINOMA ONLY.     Evaluate for extent of disease and for occult contralateral malignancy.    No family history.      Films Compared:  Compared to: 02/13/2023 Mammo Breast Stereotactic Biopsy Left, 02/13/2023   Mammo Digital Diagnostic Left, 02/09/2023 Mammo Digital Diagnostic Bilat   with Seferino, 02/09/2023 US Breast Left Complete, and 02/07/2023 Mammo   Digital Screening Bilat w/ Seferino     Technique:  A routine breast MRI was performed with a dedicated breast coil.   Pre-contrast STIR were acquired. Then, pre and post contrast T1 weighted   fat saturated images were acquired and subtracted with MIP reconstruction.   15 ml of intravenous gadolinium contrast was administered. The study was   reviewed with [employer] software.     Findings:  The breasts have heterogeneous fibroglandular tissue. The background   parenchymal enhancement is marked and asymmetric.     There are bilateral cysts.    Left breast   At the left upper outer mid-posterior depth, there is discontiguous,   clumped, non mass enhancement with an associated biopsy clip in a   segmental pattern spanning 31 mm anteroposterior dimension x 12 mm   transverse x 29 mm superoinferior dimension. This is 12 mm from the   lateral skin and 29 mm from the pectoralis. There are subcentimeter   satellites inferiorly, including 16 mm anteroinferiorly, there is an   irregular satellite mass measuring 6 x 5 x 4 mm.     Right breast   No suspicious enhancing lesions are seen.     No abnormal internal mammary or axillary lymph nodes are seen.     Impression: Biopsy proven malignancy at the left upper outer mid-posterior breast with   discontiguous, clumped, non mass enhancement in a segmental pattern   spanning 31 mm x 12 mm x 29 mm. There are subcentimeter satellites   inferiorly.     BI-RADS  Category:   Overall: 6 - Known Biopsy-Proven Malignancy     Recommendation:  Surgical Consult is recommended for the left breast.    Your estimated lifetime risk of breast cancer (to age 85) based on   Tyrer-Cuzick risk assessment model is Tyrer-Cuzick: 2.85 %. According to   the American Cancer Society, patients with a lifetime breast cancer risk   of 20% or higher might benefit from supplemental screening tests.       ECOG SCORE    1 - Restricted in strenuous activity-ambulatory and able to carry out work of a light nature          All pertinent labs and imaging reviewed. As well as outside records     Assessment:       1. Invasive ductal carcinoma of left breast in female    2. Ductal carcinoma in situ (DCIS) of left breast         # Stage I IDC within DCIS  - ER: 5.4%+, MI: negative, HER-2 non assess   -  will present at tumor board, with discussion about possible re-biopsy vs re-assess after surgery (lumpectomy vs mastectomy)  - will need to repeat the ER/MI/HER-2 on the invasive part, if no enough material we will need to discuss if AI or tamoxifine is adequate given her low positive ER  - last DEXA scan  in 2016 l osteopenia   - no indication for neoadjuvant chemo, no indication for further imaging, no indication for oncotype especially with possible T1a or T1b IDC and age >70's     Plan:     Invasive ductal carcinoma of left breast in female    Ductal carcinoma in situ (DCIS) of left breast  -     Ambulatory referral/consult to Hematology / Oncology         Patient queried and all questions were answered.    Plan was discussed with the patient and her family at length, and they verbalized understanding.        Recommend COVID guidelines being followed   Recommend  exercise, nutrition and weight management and health maintenance activities and follow-up with PCPs recommendations       F/u in 2-3 weeks after surgery     Signed:  Alexandra Hannah MD  Hematology and Oncology  [facility]  [facility] [place] [facility]  [facility]     Answers submitted by the patient for this visit:  Review of Systems Questionnaire (Submitted on 2/22/2023)  appetite change : No  unexpected weight change: No  mouth sores: No

## 2023-03-08 ENCOUNTER — DOCUMENTATION ONLY (OUTPATIENT)
Dept: SURGERY | Facility: CLINIC | Age: 73
End: 2023-03-08
Payer: MEDICARE

## 2023-03-14 ENCOUNTER — TELEPHONE (OUTPATIENT)
Dept: SURGERY | Facility: CLINIC | Age: 73
End: 2023-03-14
Payer: MEDICARE

## 2023-03-14 NOTE — TELEPHONE ENCOUNTER
Called the patient and told her that her genetic sample failed to meet the standards for her deletion/duplication analysis.  Told her she has the option of re-doing the test, or having only the sequencing portion done on her current sample, or cancelling it altogether.  She said she will come by tomorrow and let us collect another sample to send.

## 2023-03-15 ENCOUNTER — CLINICAL SUPPORT (OUTPATIENT)
Dept: REHABILITATION | Facility: HOSPITAL | Age: 73
End: 2023-03-15
Payer: MEDICARE

## 2023-03-15 DIAGNOSIS — C50.912 INVASIVE DUCTAL CARCINOMA OF LEFT BREAST IN FEMALE: ICD-10-CM

## 2023-03-15 DIAGNOSIS — Z91.89 AT RISK FOR LYMPHEDEMA: Primary | ICD-10-CM

## 2023-03-15 PROCEDURE — 97162 PT EVAL MOD COMPLEX 30 MIN: CPT | Mod: PN

## 2023-03-15 PROCEDURE — 97535 SELF CARE MNGMENT TRAINING: CPT | Mod: PN

## 2023-03-15 PROCEDURE — 97110 THERAPEUTIC EXERCISES: CPT | Mod: PN

## 2023-03-15 NOTE — PROGRESS NOTES
[facility]  Physical Therapy Initial Evaluation PRE-OP  Lymphedema Therapy    Visit Date: 3/15/2023     Name: Margaret Rouse  Clinic Number: [id number]  Therapy Diagnosis:   Encounter Diagnosis   Name Primary?    Invasive ductal carcinoma of left breast in female      Physician: Amanda Mcclellan MD  Physician Orders: PT Eval and Treat  Medical Diagnosis from Referral: Invasive ductal carcinoma of left breast  Chart review pertaining to cancer hx:   Invasive ductal carcinoma of left breast in female  Staging form: Breast, AJCC 8th Edition  - Clinical stage from 2/27/2023: cT1mi, cN0, cM0, G3, ER+, [place]-, HER2: Not Assessed - Signed by Amanda Mcclellan MD on 2/27/2023   Left UOQ mid post 3.1cm area with subcm satellites  Nml LN, confirmed with rad  Grade 3 DCIS with microinv IDC  ER 5 nearly neg W[place] neg, unable to get Her2   Case reviewed at tumor conf. No benefit to rebiopsy, solid areas sub cm in size. LN appear neg. Conveyed to pt.    Discussed surgical options, discussed 3cm area with satellite areas.   She wishes to have left mastectomy, left SLNB, recon   Will meet with erica   Discussed NAC does not appear involved by imaging but does have considerable ptosis - options to be discussed with recon team   No signif disease on last cath - cardiac clearance   Has stopped HRT. Will not get additional testosterone pellets  Does not want right mastectomy at this time.     Evaluation Date: 3/15/2023  Authorization: med necessity  Plan of Care Expiration: PT f/u 6-8 weeks post-op  Reassessment Due: 05/15/2023     Visit: 1 / 2  PTA Visit: -- / 5  Time In: 10:00 AM  Time Out: 11:05 Am  Total Billable Time: 65 minutes    Precautions: Standard, Standard and cancer    Subjective     Pt reports: Didn't initially have surgery date but it was scheduled for 04/20/2023.   Dx: Left Mastectomy, L SLNB, reconstruction  Surgery date: 04/20/2023  Radiation: pending  pathology  Chemotherapy: pending pathology    Pain  Location: none  Current 0/10, Worst 0/10, Best 0/10   Description:    Past Medical History:   Past Medical History:   Diagnosis Date    Abnormal results of liver function studies     Chest pain     Fatigue     H/O: pneumonia     History of chicken pox     Hyperlipidemia, mixed     Hypertension     Menopausal syndrome     Palpitations 12/15/2015       Past Surgical History:  has a past surgical history that includes Tonsillectomy (1955); Knee surgery (Right, 2015); Cosmetic surgery; Debridement and closure of wound of finger (Left, 11/29/2018); Cataract extraction w/ intraocular lens  implant, bilateral; Colonoscopy; and Left heart catheterization (Left, 11/18/2021).    Medications: has a current medication list which includes the following prescription(s): armour thyroid, aspirin, cyanocobalamin (vitamin b-12), diazepam, diindolylmethane, fluticasone propionate, krill oil, losartan, metformin, progesterone, rosuvastatin, vitamin a, vitamin k2, and zinc gluconate.    Allergies: Review of patient's allergies indicates:  No Known Allergies       Hand Dominance: Right  Diet: try to eat healthy, doesn't drink water like I should,   Habitus: well developed, well nourished    Prior Therapy/Previous treatment included: No PT this calendar year.   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, [employer]    Prior Exercise Routine: pt reports lives on large property, manages her garden  Prior Level of Function: independent  Current Level of Function: see above    Patient's Goals: understand what I can do following surgery    Objective     Mental Status: Alert/Oriented    Observations  Posture: WFL  Joint Integrity: WFLs bilateral  Skin Integrity: intact bilateral  Edema: none bilateral    Sensation  Light Touch: intact bilateral  Proprioception: intact bilateral    A/PROM  (L) UE:  WFLs  (R) UE: WFLs  Limitations:  none    STRENGTH  (L) UE: WFLs  (R) UE: WFLs  Limitations:  none    Baseline Measurements of BUEs  LANDMARK LEFT UE (cm) RIGHT UE (cm)   W +  16 inches 27.6  28.8   W + 12  inches 25.0 25.5   Elbow 24.5 24.3   W + 6 inches 23.0 23.9   W + 4 inches 19.0 19.0   Wrist 15.1 15.2   DPC 19.0 19.0   IP Thumb 6.0 6.2   Chest circumference NT   Axillary circumference NT    42 cm   Garments recommended:   Sigvaris Advanced arm sleeve 15-20mmHg, size S1  Sigvaris Gauntlet Size small  Pt to wear compression garment 6 weeks after sx.   Recommended to be worn up to 1 yr for prophylactic concerns according to APTA clinical guidelines published in Journal of Physical Therapy.    Functional Mobility   Bed mobility: independent   Roll to left: independent   Roll to right: independent   Supine to prone: independent   Scooting to edge of bed: independent   Supine to sit: independent   Sit to supine: independent   Transfers to bed: independent   Transfers to toilet: independent   Sit to stand: independent   Stand pivot: independent   Car transfers: independent     Gait Assessment  AD used: none  Assistance: independent  Distance: community distances  Endurance: WFL     Gait Pattern: WFL       Treatment/Education     Treatment Time In: 10:00 AM  Treatment Time Out: 11:05PM  Total Treatment time separate from Evaluation: 40 minutes      Self-Care/Home Management to improve behavioral/activity modifications related to ADLs, compensatory training, safety procedures, and adaptive equipment for 20 minutes including:  Garments: PT recommend wearing garments for one year per guidelines for prophylactical assist to decrease risk for lymphedema  Skin care  Weight management  Sleep  Nutrition  Infection prevention      Therapeutic Exercise to develop ROM, flexibility, and posture for 20 minutes including:  Surgical protocol for position recommendations  Diaphragmatic Breathing  Exercises by day, complete with  pictures of exercises and description for safe progression of motion          Education: Instructed on general anatomy/physiology, lymphedema information (definitions, signs, symptoms, precautions), role of therapy in multi-disciplinary team, purpose of lymphedema physical therapy and the benefits/risks of treatment, risks of refusing treatment, POC, and goals for therapy were discussed with the pt.    Written Home Exercises Provided: yes.  Exercises were reviewed and Shasha was able to demonstrate them prior to the end of the session. Shasha demonstrated good  understanding of the education provided.     See EMR under Patient Instructions for exercises provided 3/15/2023.    Assessment     Margaret is a 72 y.o. female referred to outpatient physical therapy with a medical diagnosis of invasive ductal carcinoma left breast, at risk lymphedema with surgical procedure planned on 04/20/2023. Pt was seen today pre-operatively to assess strength and ROM of BUEs, to take baseline circumferential measurements of BUEs to aid in the early detection of lymphedema post-operatively, and to provide pt education on exercises/precautions post-operative. Pt does not exhibit any ROM impairments currently. This pt will benefit from skilled PT for reassessment of baseline measurements 6-8 week post-operatively and to address future impairments following surgery such as pain, limited ROM, or decreased mobility. Will need to wait until pt is medically cleared to determine if pt is safe for MLD, pneumatic compression pump, or lymphatouch treatments.      Plan of care discussed with patient: Yes  Pt's spiritual, cultural and educational needs considered and patient is agreeable to the plan of care and goals as stated below:     Anticipated barriers for therapy:  none    Medical Necessity is demonstrated by the following:  History  Co-morbidities and personal factors that may impact the plan of care Co-morbidities:   advanced age, history of  cancer, and level of undertstanding of current condition    Personal Factors:   level of understanding of current condition and age     moderate   Examination  Body Structures and Functions, activity limitations and participation restrictions that may impact the plan of care Body Systems:    none    Activity limitations:   Mobility  no deficits    Self care  no deficits    Domestic Life  no deficits           low   Clinical Presentation evolving clinical presentation with changing clinical characteristics moderate   Decision Making/ Complexity Score: moderate       GOALS  Short Term Goals: 3 months  Pt to be seen for reassessment in 6-8 weeks after surgery.  Pt will demonstrate 100% knowledge of lymphedema precautions and signs of infection.  Pt to obtain compression garments for prophylactic concerns according to APTA clinical guidelines published in Journal of Physical Therapy.    Long Term Goals: deferred    Plan     Plan of Care: 3/15/2023 to 06/15/2023.    Patient to be seen in 6-8 weeks following surgical date for 1-2 visits for reassessment. Patient will benefit from Outpatient Physical Therapy services which may include the following interventions: patient education, HEP, therapeutic exercises, neuromuscular re-education, therapeutic activity, manual therapy, self care/home management, modalities, gait training, decongestive massage, multi-layered bandaging, self massage, self bandaging, and assistance in obtaining appropriate compression garment.      If pt is to undergo XRT, POC must exclude MLD, pneumatic compression pump, and lymphatouch to any radiated area.       Leena Woodard, PT , CLT

## 2023-03-15 NOTE — PATIENT INSTRUCTIONS
Garments recommended for prophylatic measures:   Sigvaris Advanced arm sleeve 15-20mmHg, size S1  Sigvaris Gauntlet Size small    Pt to wear compression garment 6 weeks after sx.     [facility]  [id number] [facility] [address], [place] [address]  [phone]      Post Operative Breast Cancer Surgery Exercises    After surgery your shoulder and chest may feel tight and sore. Movement may cause stretching across your chest, axilla (armpit), and the incision site limiting your ability to raise your arm. Exercise will be extremely important in preventing swelling and in helping you regain movement, strength, and use of your arm.     Your post-operative exercise program can be divided into three stages. Your surgeon will inform you when to move to the next stage.     STAGE TIME PURPOSE EXERCISE   I Post-op day 1 to 4  (Drains are in) To prevent and/or reduce swelling - Positioning  - Hand and arm precautions   II Post-op day 5 to 14  (After drains are removed) To provide gentle movement without much stretching  - Shoulder shrugs  - Shoulder retraction   III Post-op day 15-6 wks  (After suture are removed) To stretch and regain full motion - Wall ladder  - Range of motion exercises  - Wand exercises       These exercises are general guideline for use following a mastectomy. Please consult your physician for additional information. Of a tissue expander has been placed, or if you have had reconstruction, you must get approval from your physician before beginning exercises.   Check with your physician prior to beginning a new stage.  Avoid elbows above shoulders until after post-op day 14.    STAGE 1      Abdominal Breathing Exercises      Get comfortable and relax your neck and shoulders. You can sit or lie down. Place one hand on your upper chest and place the other hand on your belly button. Use your hands to feel the movements as you breathe in and out.  Take a deep breath in through your nose  and feel the hand on your stomach move out. Do not let your shoulders move up. The hand on your chest should not move.   Breathe out slow and gentle through your mouth. Pucker your lips as if you were going to whistle or blow out a candle. The hand on your stomach should move in as you breathe out. Breathe out as long as you can until all the air is gone.   To help keep the lymphatic system moving well, practice two breaths every hour using the steps for abdominal breathing exercises.        Positioning    Several times a day, elevate your arm on pillows so your hand is above the level of your heart. This position will allow gravity to drain excess fluids out of your hand and arm. Try to place pillows under involved arm while in sitting position or while laying down.                STAGE 2    Shoulder Shrugs Shoulder Retraction    While sitting with arms supported, shrug both of your shoulder and then relax. Repeat 10 times, 3 times a day.   While sitting or standing, pull both shoulder back, bringing shoulder blades together. Repeat 10 times,   3 times a day.        STAGE 3  Range of Motion Exercises    To retain full motion of your shoulder, it must be moved in all planes, several times a day. Begin arm range of motion exercises with 10 reps of each, 2 times a day. Progress to 3 x 10 reps, as tolerated 2 times a day.    Wand - Shoulder Flexion       Lay on your back in a comfortable position. Raise both arms overhead, then bring back down by your side.        Wand - Shoulder Abduction       Lay on your back in a comfortable position. Raise both arms out to your side, then bring back down by your side.        Wand - Shoulder External Rotation      Lay on your back in a comfortable position. Position your arms as pictured below, with your elbows bent and your hand in the arm. Slowly lower your hand down, then bring back up.        Wand - Shoulder Flexion      Hold onto a cane or broom with both hands approximately  14 inches apart with arms straight at your side. Raise the cane forward and upwards as far as you can go or until your elbow is near your ear. Then gradually return to the original position.        Wand - Shoulder Abduction      Hold onto the ends of a cane with both hands, then raise the involved arm sideways and upward over your head with the aid of the cane and the good arm. Keep trunk straight! Then gradually return to original position.        Wand - Shoulder External Rotation      Holding onto a cane with both hands approximately 14 inches apart at shoulder level, turn the cane clockwise and then counterclockwise as far as possible.        Wall Climbing - Shoulder Flexion      Stand facing the wall and extend the involved arm directly in front of you so that your fingertips touch the wall (at arm's length away from the wall). Creep up the side of the wall with your fingertips, taking a step toward the wall as you reach higher and higher up the wall. Repeat this procedure going down the wall, but taking a step backward this time. Begin with 5 wall climbs, then progress to 10 reps at a time, 1-2 times a day.        Wall Climbing - Shoulder Abduction     ([email or web address]  home-exercises-[email or web address]-[email or web address]/) Repeat the above procedure, but this time position yourself perpendicular (at a right angle) to the wall so that the involved arm will extend sideways up the wall. Keep your trunk straight, not leaning toward the wall or shrugging your shoulder. Place a mary (tape) on the wall each week to see if you are making progress. Begin with 5 wall climbs, then progress to 10 reps at a time, 1-2 times a day.        PRECAUTIONS    As a result of the removal of lymph nodes and vessels, your arm is susceptible to infection and swelling. A hot, reddened or swollen arm denotes the presence of infection and should be brought to the attention of your physician immediately. Try to avoid cuts, scratches,  pinpricks, hangnails, sunburns, insect bites, chemical irritation, and irritating detergent.    The following are helpful hints:    Avoid injections, blood draws, blood pressure, and IVs to be done to your involved arm. If you have undergone axillary dissection, then consider using the opposite only for injections, blood draws, blood pressure, and IVs.  Prevent chapping of your hand and arm by applying lotion liberally several times a day. Recommend Eucerin lotion, No massages to affected upper extremity if you have had lymph nodes dissection or radiation to lymph nodes.   Do not cut nails too close to the quick. Do not cut or pick your cuticles. Use cuticle cream or remover.  Avoid carrying heavy objects (over 5 lbs) with your involved arm.   Protect your arm from burns with small electrical appliances such as irons, curling irons, and frying pans.   Wear sunscreen in the sun.  Apply insect repellent when going to areas where you might be exposed to insect bites.   Use an electric razor for shaving.   Wear loose fitting work/rubber gloves when washing dishes, using , or using steel wool.  Wear garden gloves when gardening or arranging thorn flowers.  Do not wear tight jewelry on your affected arm.  Do not wear narrow tight straps on clothing or under garments.    IMPORTANT    If you do cut, burn, or reynolds the skin, wash the area and use an antiseptic. If it becomes red, warm, or unusually hard or swollen, contact your physician immediately.     If there is swelling without redness, increased warmth or hardness, the positioning and pumping exercises as described above can help decrease the swelling. Position your hand higher than the elbow, elbow higher than the shoulder, and shoulder higher than your heart. Maintain this position for 30 minutes. Repeat as necessary.     OCCUPATIONAL AND PHYSICAL THERAPY    Most women have enough motion in their involved arm to perform normal activities within 2 months  after surgery. Any post-operative swelling or pain has probably disappeared. However; some women may develop persistent stiffness, weakness, pain, or swelling. If this is your experience, call your physician. He or she may recommend that a physical or occupational therapist work with you to minimize your problems.     CONCLUSION    Besides your exercise program, your daily routine at home can be continued and will be beneficial toward your recovery:  Getting dressed every day  Daily grooming  Cooking and light housework  Being active with family and friends    REMINDER  Pace yourself!  Strive for a balance between work and relaxation  Avoid excessive pulling and lifting which may strain your shoulder

## 2023-03-22 ENCOUNTER — TELEPHONE (OUTPATIENT)
Dept: SURGERY | Facility: CLINIC | Age: 73
End: 2023-03-22
Payer: MEDICARE

## 2023-03-22 NOTE — TELEPHONE ENCOUNTER
"Called the patient to give her the date and time of her nurse pre op appt at [facility].  She verbalized understanding.  She also asked about a procedure she read about, involving nerve "grafting" or some way to preserve sensation after mastectomy.  Told her to talk with Dr Bro about it at her pre op appt on 4/6, and I will pass on to Dr Mcclellan as well.  "

## 2023-03-22 NOTE — TELEPHONE ENCOUNTER
Called the patient back and told her Dr Mcclellan said that Dr Bro will have to address the question about nerve grafting or sensation preservation.  She verbalized understanding and will discuss with him at her pre op visit on 4/6.

## 2023-03-30 ENCOUNTER — DOCUMENTATION ONLY (OUTPATIENT)
Dept: SURGERY | Facility: CLINIC | Age: 73
End: 2023-03-30
Payer: MEDICARE

## 2023-03-30 RX ORDER — MAGNESIUM 200 MG
400 TABLET ORAL DAILY
COMMUNITY
End: 2023-09-13 | Stop reason: CLARIF

## 2023-04-11 ENCOUNTER — DOCUMENTATION ONLY (OUTPATIENT)
Dept: SURGERY | Facility: CLINIC | Age: 73
End: 2023-04-11
Payer: MEDICARE

## 2023-04-26 ENCOUNTER — DOCUMENTATION ONLY (OUTPATIENT)
Dept: SURGERY | Facility: CLINIC | Age: 73
End: 2023-04-26
Payer: MEDICARE

## 2023-05-03 ENCOUNTER — DOCUMENTATION ONLY (OUTPATIENT)
Dept: SURGERY | Facility: CLINIC | Age: 73
End: 2023-05-03
Payer: MEDICARE

## 2023-05-03 NOTE — PROGRESS NOTES
Imaging and Chronology:      2/4/2022 Bilateral Screening Mammo - [facility]   No mammographic evidence of malignancy.        2/07/2023 Bilateral Screening Mammo - [facility]   Left calcifications at the upper outer posterior position.   Left breast asymmetry at the outer middle position.      Right breast asymmetry at the outer middle position.        2/09/2023 Bilateral Dx Mammo, Left US complete - [facility]   Left Pleomorphic calcifications in the upper outer posterior breast spanning 25 mm on the CC magnification view.  Left smaller group of similar calcifications about 20 mm anterosuperior to the first group spanning 5 mm.     Right UOQ mid asymmetry does not persist     No abn left LN        2/13/2023 Left Dx Mammo, Left Stereotactic Biopsy - [facility]  MERRILL  DCIS, COMEDO TYPE, WITH A 0.1 CM FOCUS OF INVASION  This is malignant and concordant with imaging findings     ER Low Positive 5.4 %  CO Negative 0.1 %  Insufficient tissue for Her 2        2/23/2023 MRI Breast - 2/23/2023 [facility]  Left UOQ mid-posterior discontiguous, clumped, non mass enhancement in a segmental pattern spanning 31 mm x 12 mm x 29 mm. 12mm from lateral skin, 29mm from pec  There are subcentimeter satellites inferiorly, including 16mm antinf 6mm mass, nipple clear  Right neg  Nml LN     04/10/2023 47 gene panel negative    4/20/23 left skin and nipple sparing mastectomy, left SLNB  0/1 SLNB  Left grade 2 IDC 1.5mm within high grade DCIS  Margins neg, additional margins negative  ER CO neg Her2 3+ pos Ki 28       Cancer Staging   Invasive ductal carcinoma of left breast in female  Staging form: Breast, AJCC 8th Edition  - Clinical stage from 2/27/2023: cT1mi, cN0, cM0, G3, ER+, CO-, HER2: Not Assessed - Signed by Amanda Mcclellan MD on 2/27/2023  - Pathologic stage from 5/1/2023: Stage IA (pT1a, pN0(sn), cM0, G2, ER-, CO-, HER2+) - Signed by Amanda Mcclellan MD on 5/3/2023       Called pt with results  Left 1.5mm Grade 2 IDC ER CO neg Her2 3+ pos    She is doing  well. Discussed potentials with ER [place] neg Her2 pos. Does have 1.5mm invasion however large area of high grade DCIS. 3cm enhancement with satellite lesions by imaging.   will weigh in with med onc opinion.     [facility] and med onc

## 2023-05-04 ENCOUNTER — OFFICE VISIT (OUTPATIENT)
Dept: HEMATOLOGY/ONCOLOGY | Facility: CLINIC | Age: 73
End: 2023-05-04
Payer: MEDICARE

## 2023-05-04 VITALS
WEIGHT: 153.44 LBS | DIASTOLIC BLOOD PRESSURE: 76 MMHG | TEMPERATURE: 98 F | HEART RATE: 91 BPM | RESPIRATION RATE: 16 BRPM | SYSTOLIC BLOOD PRESSURE: 140 MMHG | HEIGHT: 66 IN | BODY MASS INDEX: 24.66 KG/M2 | OXYGEN SATURATION: 98 %

## 2023-05-04 DIAGNOSIS — D05.12 DUCTAL CARCINOMA IN SITU (DCIS) OF LEFT BREAST: ICD-10-CM

## 2023-05-04 DIAGNOSIS — C50.912 INVASIVE DUCTAL CARCINOMA OF LEFT BREAST IN FEMALE: Primary | ICD-10-CM

## 2023-05-04 PROCEDURE — 99999 PR PBB SHADOW E&M-EST. PATIENT-LVL IV: CPT | Mod: PBBFAC,,, | Performed by: STUDENT IN AN ORGANIZED HEALTH CARE EDUCATION/TRAINING PROGRAM

## 2023-05-04 PROCEDURE — 99999 PR PBB SHADOW E&M-EST. PATIENT-LVL IV: ICD-10-PCS | Mod: PBBFAC,,, | Performed by: STUDENT IN AN ORGANIZED HEALTH CARE EDUCATION/TRAINING PROGRAM

## 2023-05-04 PROCEDURE — 99215 OFFICE O/P EST HI 40 MIN: CPT | Mod: S$PBB,,, | Performed by: STUDENT IN AN ORGANIZED HEALTH CARE EDUCATION/TRAINING PROGRAM

## 2023-05-04 PROCEDURE — 99214 OFFICE O/P EST MOD 30 MIN: CPT | Mod: PBBFAC,PN | Performed by: STUDENT IN AN ORGANIZED HEALTH CARE EDUCATION/TRAINING PROGRAM

## 2023-05-04 PROCEDURE — 99215 PR OFFICE/OUTPT VISIT, EST, LEVL V, 40-54 MIN: ICD-10-PCS | Mod: S$PBB,,, | Performed by: STUDENT IN AN ORGANIZED HEALTH CARE EDUCATION/TRAINING PROGRAM

## 2023-05-04 RX ORDER — CEFADROXIL 500 MG/1
CAPSULE ORAL
COMMUNITY
Start: 2023-04-11 | End: 2023-05-11

## 2023-05-04 RX ORDER — MUPIROCIN 20 MG/G
OINTMENT TOPICAL
COMMUNITY
Start: 2023-04-22 | End: 2023-09-13 | Stop reason: CLARIF

## 2023-05-04 NOTE — PLAN OF CARE
START ON PATHWAY REGIMEN - Breast    [id number]        Trastuzumab-xxxx       Paclitaxel       Trastuzumab-xxxx       Paclitaxel       Trastuzumab-xxxx           Additional Orders: Obtain LVEF assessment prior to initiation of   trastuzumab and as clinically indicated during and after treatment. See PI for   details. Reload trastuzumab if a dose is missed by more than one week.    **Always confirm dose/schedule in your pharmacy ordering system**    Patient Characteristics:  Postoperative without Neoadjuvant Therapy (Pathologic Staging), Invasive   Disease, Adjuvant Therapy, HER2 Positive, ER Negative/Unknown, Node Negative,   pT1a, pN0/N1mi, Chemotherapy Indicated  Therapeutic Status: Postoperative without Neoadjuvant Therapy (Pathologic   Staging)  AJCC Grade: G2  AJCC N Category: pN0  AJCC M Category: cM0  ER Status: Negative (-)  AJCC 8 Stage Grouping: IA  HER2 Status: Positive (+)  Oncotype Dx Recurrence Score: Not Appropriate  AJCC T Category: pT1a  ND Status: Negative (-)  Adjuvant Therapy Status: No Adjuvant Therapy Received Yet or Changing Initial   Adjuvant Regimen due to Tolerance  Intervention Indicated: Chemotherapy  Intent of Therapy:  Curative Intent, Discussed with Patient

## 2023-05-04 NOTE — PROGRESS NOTES
Subjective:                                                                                   Patient ID:    Name: Margaret Rouse  : 1950  MRN: [id number]      HPI:   Margaret Rouse is a 72 y.o. female presents for evaluation of Invasive ductal carcinoma of left breast in female (Post op follow up)    Patient was seen for a newly diagnosed stage I, breast lesion was seen by a screening mammogram:  23 MRI Breasts: clumped non-mass enhancement with associated clip 3.1 x 1.2 x 2.9cm with sub-cm satellites inferiorly up to 6mm at 1.6cm inferior.  No abnormal SARAH. Biopsy positive for IDC/DCIS, ER; low positive, FL: negative, no HER-2 since was insufficient invasive tumor for IDC and was run on the DCIS part.      we discussed that we might need further biopsy to find out the final ER/FL/HER-2 of her invasive part vs possible going to surgery upfront and re-assess afterwards,. Patient denies any symptoms related to her beast.     Today, patient is s/p left mastectomy, initially was DCIS, now found to have IDC with Her2 +, [id number], long discussion with patient and her  about the benefit of adjuvant chemo (HER-2+ therapy), agreed on proceeding      Oncology History   Invasive ductal carcinoma of left breast in female   2023 Initial Diagnosis    Invasive ductal carcinoma of left breast in female       2023 Cancer Staged    Staging form: Breast, AJCC 8th Edition  - Clinical stage from 2023: cT1mi, cN0, cM0, G3, ER+, FL-, HER2: Not Assessed       2023 Cancer Staged    Staging form: Breast, AJCC 8th Edition  - Pathologic stage from 2023: Stage IA (pT1a, pN0(sn), cM0, G2, ER-, FL-, HER2+)       2023 -  Chemotherapy    Treatment Summary   Plan Name: OP BREAST TRASTUZUMAB PACLITAXEL WEEKLY  Treatment Goal: Curative  Status: Active  Start Date: 2023 (Planned)  End Date: 2024 (Planned)  Provider: Alexandra Hannah MD  Chemotherapy: PACLITAXEL INFUSION, 80 mg/m2,  Intravenous, Clinic/HOD 1 time, 0 of 12 cycles  TRASTUZUMAB-DKST INFUSION, 4 mg/kg, Intravenous, Clinic/HOD 1 time, 0 of 25 cycles       Ductal carcinoma in situ (DCIS) of left breast   3/5/2023 Initial Diagnosis    Ductal carcinoma in situ (DCIS) of left breast       5/4/2023 -  Chemotherapy    Treatment Summary   Plan Name: OP BREAST TRASTUZUMAB PACLITAXEL WEEKLY  Treatment Goal: Curative  Status: Active  Start Date: 5/4/2023 (Planned)  End Date: 4/4/2024 (Planned)  Provider: Alexandra Hannah MD  Chemotherapy: PACLITAXEL INFUSION, 80 mg/m2, Intravenous, Clinic/HOD 1 time, 0 of 12 cycles  TRASTUZUMAB-DKST INFUSION, 4 mg/kg, Intravenous, Clinic/HOD 1 time, 0 of 25 cycles              Past Medical History:   Diagnosis Date    Abnormal results of liver function studies     Cancer     left breast cancer    Chest pain     Fatigue     H/O: pneumonia     History of chicken pox     Hyperlipidemia, mixed     Hypertension     Menopausal syndrome     Palpitations 12/15/2015    PONV (postoperative nausea and vomiting)     Thyroid disease        Past Surgical History:   Procedure Laterality Date    BREAST RECONSTRUCTION Left 4/20/2023    Procedure: RECONSTRUCTION, BREAST;  Surgeon: Pipo Bro MD;  Location: [facility] OR;  Service: Plastics;  Laterality: Left;    CATARACT EXTRACTION W/ INTRAOCULAR LENS  IMPLANT, BILATERAL      COLONOSCOPY      COSMETIC SURGERY      DEBRIDEMENT AND CLOSURE OF WOUND OF FINGER Left 11/29/2018    Procedure: DEBRIDEMENT, WOUND, FINGER, WITH CLOSURE, VY FLap;  Surgeon: Frederic Barbour MD;  Location: [facility] OR;  Service: Plastics;  Laterality: Left;    INSERTION OF BREAST IMPLANT Left 4/20/2023    Procedure: INSERTION, BREAST IMPLANT;  Surgeon: Pipo Bro MD;  Location: [facility] OR;  Service: Plastics;  Laterality: Left;    KNEE SURGERY Right 2015    LEFT HEART CATHETERIZATION Left 11/18/2021    Procedure: CATHETERIZATION, HEART, LEFT;  Surgeon: Basim Castrejon MD;  Location: [facility] CATH;  Service: Cardiology;   Laterality: Left;    PLACEMENT OF ACELLULAR HUMAN DERMAL ALLOGRAFT Left 4/20/2023    Procedure: APPLICATION, ACELLULAR HUMAN DERMAL ALLOGRAFT;  Surgeon: Pipo Bro MD;  Location: [facility] OR;  Service: Plastics;  Laterality: Left;    SENTINEL LYMPH NODE BIOPSY Left 4/20/2023    Procedure: BIOPSY, LYMPH NODE, SENTINEL;  Surgeon: Amanda Mcclellan MD;  Location: [facility] OR;  Service: General;  Laterality: Left;    SIMPLE MASTECTOMY Left 4/20/2023    Procedure: MASTECTOMY, SIMPLE;  Surgeon: Amanda Mcclellan MD;  Location: [facility] OR;  Service: General;  Laterality: Left;    TONSILLECTOMY  1955       Family History   Problem Relation Age of Onset    No Known Problems Mother     Heart disease Father     Heart attack Father         at age 64    Cancer Sister         folicular non-hodgkins lymphoma    Lymphoma Sister     Diabetes Maternal Grandmother        Social History     Socioeconomic History    Marital status:    Tobacco Use    Smoking status: Former    Smokeless tobacco: Never    Tobacco comments:     50 years ago   Substance and Sexual Activity    Alcohol use: No     Comment: rarely    Drug use: No    Sexual activity: Yes       Review of patient's allergies indicates:  No Known Allergies    Review of Systems   Constitutional: Negative for chills, decreased appetite and weight loss.   Eyes:  Negative for visual disturbance.   Cardiovascular:  Negative for chest pain.   Respiratory:  Negative for cough and shortness of breath.    Hematologic/Lymphatic: Negative for adenopathy.   Skin:  Negative for rash.   Musculoskeletal:  Negative for back pain.   Gastrointestinal:  Negative for abdominal pain and diarrhea.   Genitourinary:  Negative for frequency.   Neurological:  Negative for headaches.   Psychiatric/Behavioral:  The patient is nervous/anxious.           Objective:     Vitals:    05/04/23 1425   BP: (!) 140/76   BP Location: Right arm   Patient Position: Sitting   BP Method: Medium (Manual)   Pulse: 91  "  Resp: 16   Temp: 98 °F (36.7 °C)   TempSrc: Temporal   SpO2: 98%   Weight: 69.6 kg (153 lb 7 oz)   Height: 5' 6" (1.676 m)        Physical Exam  Constitutional:       Appearance: Normal appearance.   Eyes:      General: No scleral icterus.  Cardiovascular:      Rate and Rhythm: Normal rate.   Pulmonary:      Effort: No respiratory distress.      Breath sounds: Normal breath sounds.   Chest:      Comments: Left mastectomy with reconstruction   Abdominal:      General: There is no distension.   Musculoskeletal:         General: No swelling.      Cervical back: No rigidity.   Skin:     Coloration: Skin is not jaundiced.   Neurological:      General: No focal deficit present.   Psychiatric:         Behavior: Behavior normal.             Current Outpatient Medications on File Prior to Visit   Medication Sig    ARMOUR THYROID 30 mg Tab once daily.    cyanocobalamin, vitamin B-12, 3,000 mcg Cap Take by mouth once daily. Triple Blend with folate 680mcg    fluticasone propionate (FLONASE) 50 mcg/actuation nasal spray 1 spray by Each Nostril route once daily.    losartan (COZAAR) 25 MG tablet once daily.    magnesium 200 mg Tab Take 400 mg by mouth once daily.    metFORMIN (GLUCOPHAGE) 500 MG tablet Take 500 mg by mouth 2 (two) times daily.    mupirocin (BACTROBAN) 2 % ointment SMARTSIG:Both Nares    rosuvastatin (CRESTOR) 20 MG tablet Take 20 mg by mouth once daily.    zinc gluconate 50 mg tablet Take 25 mg by mouth once daily.    ZINC ORAL Take 1 tablet by mouth once daily. Zinc 22mg with Quercetin 800mg combined    aspirin (ECOTRIN) 81 MG EC tablet Take 81 mg by mouth once daily.    KRILL OIL ORAL Take 2,000 mg by mouth once daily.    ondansetron (ZOFRAN-ODT) 4 MG TbDL Take 1 tablet (4 mg total) by mouth every 8 (eight) hours as needed (nausea). (Patient not taking: Reported on 5/4/2023)    VITAMIN A ORAL Take 5,000 Int'l Units/L by mouth once daily.    vitamin K2 45 mcg Cap Take by mouth once daily.     Current " Facility-Administered Medications on File Prior to Visit   Medication    0.9%  NaCl infusion       CBC:  Lab Results   Component Value Date    WBC 6.63 04/13/2023    HGB 12.8 04/13/2023    HCT 40.4 04/13/2023    MCV 95 04/13/2023     04/13/2023         CMP:  Sodium   Date Value Ref Range Status   04/13/2023 139 136 - 145 mmol/L Final     Potassium   Date Value Ref Range Status   04/13/2023 4.3 3.5 - 5.1 mmol/L Final     Chloride   Date Value Ref Range Status   04/13/2023 105 95 - 110 mmol/L Final     CO2   Date Value Ref Range Status   04/13/2023 26 22 - 31 mmol/L Final     Glucose   Date Value Ref Range Status   04/13/2023 107 70 - 110 mg/dL Final     Comment:     The ADA recommends the following guidelines for fasting glucose:    Normal:       less than 100 mg/dL    Prediabetes:  100 mg/dL to 125 mg/dL    Diabetes:     126 mg/dL or higher       BUN   Date Value Ref Range Status   04/13/2023 16 7 - 18 mg/dL Final     Creatinine   Date Value Ref Range Status   04/13/2023 0.63 0.50 - 1.40 mg/dL Final     Calcium   Date Value Ref Range Status   04/13/2023 9.0 8.4 - 10.2 mg/dL Final     Total Protein   Date Value Ref Range Status   07/01/2022 7.2 6.0 - 8.4 g/dL Final     Albumin   Date Value Ref Range Status   07/01/2022 4.3 3.5 - 5.2 g/dL Final     Total Bilirubin   Date Value Ref Range Status   07/01/2022 0.4 0.2 - 1.3 mg/dL Final     Alkaline Phosphatase   Date Value Ref Range Status   07/01/2022 45 38 - 145 U/L Final     AST ([place])   Date Value Ref Range Status   12/28/2015 41 (H) 14 - 36 U/L Final     AST   Date Value Ref Range Status   07/01/2022 32 14 - 36 U/L Final     ALT   Date Value Ref Range Status   07/01/2022 23 0 - 35 U/L Final     Anion Gap   Date Value Ref Range Status   04/13/2023 8 8 - 16 mmol/L Final     eGFR if    Date Value Ref Range Status   07/01/2022 >60 >60 mL/min/1.73 m^2 Final     eGFR if non    Date Value Ref Range Status   07/01/2022 >60 >60  mL/min/1.73 m^2 Final     Comment:     Calculation used to obtain the estimated glomerular filtration  rate (eGFR) is the CKD-EPI equation.          Echo  · The left ventricle is normal in size with normal systolic function.  · The estimated ejection fraction is 65%.  · Normal left ventricular diastolic function.  · Normal right ventricular size with normal right ventricular systolic   function.  · Normal central venous pressure (3 mmHg).  · The left ventricular global longitudinal strain is -17.9%.          ECOG SCORE    0 - Fully active-able to carry on all pre-disease performance without restriction          All pertinent labs and imaging reviewed. As well as outside records     Assessment:       1. Invasive ductal carcinoma of left breast in female    2. Ductal carcinoma in situ (DCIS) of left breast      # Stage I IDC within DCIS  - - no indication for neoadjuvant chemo, no indication for further imaging, no indication for oncotype especially with possible T1a or T1b IDC and age >70's   - ER: 5.4%+, [place]: negative, HER-2 non assess initially then HER-2  +after IDC  was found  - significant discussion about adjuvant chemo vs endocrine but given her low ER/[place] positivity, HER-2 therapy might be her only way for preventing this cancer from coming back  - decision to move forward with , needs Echo,  chemo class, navigator, no need for port, CBC/CMP prior to next visit   - last DEXA scan  in 2016 l osteopenia     Plan:     Invasive ductal carcinoma of left breast in female  -     Echo; Future    Ductal carcinoma in situ (DCIS) of left breast           Patient queried and all questions were answered.    Plan was discussed with the patient and her family at length, and they verbalized understanding.        Recommend COVID guidelines being followed   Recommend  exercise, nutrition and weight management and health maintenance activities and follow-up with PCPs recommendations       Route Chart for Scheduling    Med Onc  Chart Routing      Follow up with physician . Follow up with 1st day of chemo   Follow up with SHADI    Infusion scheduling note    Injection scheduling note    Labs    Imaging    Pharmacy appointment    Other referrals           Treatment Plan Information   OP BREAST TRASTUZUMAB PACLITAXEL WEEKLY   Alexandra Hannah MD   Upcoming Treatment Dates - OP BREAST TRASTUZUMAB PACLITAXEL WEEKLY    5/4/2023       Pre-Medications       diphenhydrAMINE (BENADRYL) 50 mg in NS 50 mL IVPB       dexAMETHasone (DECADRON) 10 mg in sodium chloride 0.9% 50 mL IVPB       famotidine (PF) injection 20 mg       Chemotherapy       trastuzumab-dkst (OGIVRI) 278 mg in sodium chloride 0.9% 250 mL chemo infusion       PACLitaxeL (TAXOL) 80 mg/m2 = 144 mg in sodium chloride 0.9% chemo infusion  5/11/2023       Pre-Medications       diphenhydrAMINE (BENADRYL) 50 mg in NS 50 mL IVPB       dexAMETHasone (DECADRON) 10 mg in sodium chloride 0.9% 50 mL IVPB       famotidine (PF) injection 20 mg       Chemotherapy       trastuzumab-dkst (OGIVRI) 139 mg in sodium chloride 0.9% 250 mL chemo infusion       PACLitaxeL (TAXOL) 80 mg/m2 = 144 mg in sodium chloride 0.9% chemo infusion  5/18/2023       Pre-Medications       diphenhydrAMINE (BENADRYL) 50 mg in NS 50 mL IVPB       dexAMETHasone (DECADRON) 10 mg in sodium chloride 0.9% 50 mL IVPB       famotidine (PF) injection 20 mg       Chemotherapy       trastuzumab-dkst (OGIVRI) 139 mg in sodium chloride 0.9% 250 mL chemo infusion       PACLitaxeL (TAXOL) 80 mg/m2 = 144 mg in sodium chloride 0.9% chemo infusion  5/25/2023       Pre-Medications       diphenhydrAMINE (BENADRYL) 50 mg in NS 50 mL IVPB       dexAMETHasone (DECADRON) 10 mg in sodium chloride 0.9% 50 mL IVPB       famotidine (PF) injection 20 mg       Chemotherapy       trastuzumab-dkst (OGIVRI) 139 mg in sodium chloride 0.9% 250 mL chemo infusion       PACLitaxeL (TAXOL) 80 mg/m2 = 144 mg in sodium chloride 0.9% chemo infusion      Signed:  Alexandra  MD [place]  Hematology and Oncology  [facility]  [facility] [place] [facility]     Answers submitted by the patient for this visit:  Review of Systems Questionnaire (Submitted on 2/22/2023)  appetite change : No  unexpected weight change: No  mouth sores: No

## 2023-05-08 ENCOUNTER — TELEPHONE (OUTPATIENT)
Dept: HEMATOLOGY/ONCOLOGY | Facility: CLINIC | Age: 73
End: 2023-05-08
Payer: MEDICARE

## 2023-05-08 DIAGNOSIS — D05.12 DUCTAL CARCINOMA IN SITU (DCIS) OF LEFT BREAST: ICD-10-CM

## 2023-05-08 DIAGNOSIS — C50.912 INVASIVE DUCTAL CARCINOMA OF LEFT BREAST IN FEMALE: Primary | ICD-10-CM

## 2023-05-08 NOTE — NURSING
Spoke with pt.  Scheduled Echo for Thursday and chemo class for Friday.  Reviewed location, date and times of each appt.  Pt asked to be scheduled for chemo on a Monday.  Will get back to her tomorrow regarding infusion appts.

## 2023-05-09 DIAGNOSIS — C50.912 INVASIVE DUCTAL CARCINOMA OF LEFT BREAST IN FEMALE: Primary | ICD-10-CM

## 2023-05-10 ENCOUNTER — TELEPHONE (OUTPATIENT)
Dept: HEMATOLOGY/ONCOLOGY | Facility: CLINIC | Age: 73
End: 2023-05-10
Payer: MEDICARE

## 2023-05-10 ENCOUNTER — PATIENT MESSAGE (OUTPATIENT)
Dept: HEMATOLOGY/ONCOLOGY | Facility: CLINIC | Age: 73
End: 2023-05-10
Payer: MEDICARE

## 2023-05-10 NOTE — NURSING
"Spoke with pt to review upcoming appts.  She is requesting to start chemo on 5/22/23.  Will check with Infusion.  Answered questions regarding chemo and what to expect.  She and Dr. Hannah discussed and agreed that she does not need a port at this time.  Pt expressed that she was feeling "down".  She said her coping devices are gardening and baking.  Due to post op restrictions, she is unable to do as much of those things as she would like.  She says she is having trouble sleeping.  I offered and discussed psychologist and/or Integrative oncology appointments.   She wishes to see IO first.  I told her Nhung would help her decide if she would also benefit from seeing one of our psychologist. I told her if Infusion can treat her on 5/22, I would schedule labs & f/u with Dr. Hannah on 5/19.  She is agreeable with this plan.  Encouraged her to call me with any questions or concerns.  She was very appreciative of my help and verbalized understanding.   "

## 2023-05-10 NOTE — NURSING
Scheduled Infusion on 5/22/23.  Lab work and f/u with Dr. Hannah scheduled for 5/19/22.  Message sent via portal as requested.

## 2023-05-11 ENCOUNTER — OFFICE VISIT (OUTPATIENT)
Dept: HEMATOLOGY/ONCOLOGY | Facility: CLINIC | Age: 73
End: 2023-05-11
Payer: MEDICARE

## 2023-05-11 ENCOUNTER — CLINICAL SUPPORT (OUTPATIENT)
Dept: CARDIOLOGY | Facility: HOSPITAL | Age: 73
End: 2023-05-11
Attending: STUDENT IN AN ORGANIZED HEALTH CARE EDUCATION/TRAINING PROGRAM
Payer: MEDICARE

## 2023-05-11 VITALS
HEART RATE: 90 BPM | WEIGHT: 152.5 LBS | DIASTOLIC BLOOD PRESSURE: 81 MMHG | HEIGHT: 66 IN | BODY MASS INDEX: 24.51 KG/M2 | TEMPERATURE: 98 F | OXYGEN SATURATION: 95 % | SYSTOLIC BLOOD PRESSURE: 124 MMHG

## 2023-05-11 VITALS
WEIGHT: 152 LBS | SYSTOLIC BLOOD PRESSURE: 124 MMHG | HEIGHT: 66 IN | BODY MASS INDEX: 24.43 KG/M2 | DIASTOLIC BLOOD PRESSURE: 81 MMHG

## 2023-05-11 DIAGNOSIS — R45.89 ANXIETY ABOUT HEALTH: ICD-10-CM

## 2023-05-11 DIAGNOSIS — C50.912 INVASIVE DUCTAL CARCINOMA OF LEFT BREAST IN FEMALE: ICD-10-CM

## 2023-05-11 DIAGNOSIS — G47.00 INSOMNIA, UNSPECIFIED TYPE: Primary | ICD-10-CM

## 2023-05-11 LAB
ASCENDING AORTA: 2.44 CM
AV INDEX (PROSTH): 0.74
AV MEAN GRADIENT: 4 MMHG
AV PEAK GRADIENT: 9 MMHG
AV VALVE AREA: 2.37 CM2
AV VELOCITY RATIO: 0.69
BSA FOR ECHO PROCEDURE: 1.79 M2
CV ECHO LV RWT: 0.42 CM
DOP CALC AO PEAK VEL: 1.49 M/S
DOP CALC AO VTI: 31.8 CM
DOP CALC LVOT AREA: 3.2 CM2
DOP CALC LVOT DIAMETER: 2.02 CM
DOP CALC LVOT PEAK VEL: 1.03 M/S
DOP CALC LVOT STROKE VOLUME: 75.27 CM3
DOP CALCLVOT PEAK VEL VTI: 23.5 CM
E WAVE DECELERATION TIME: 209.88 MSEC
E/A RATIO: 0.76
E/E' RATIO: 8.93 M/S
ECHO LV POSTERIOR WALL: 0.88 CM (ref 0.6–1.1)
EJECTION FRACTION: 65 %
FRACTIONAL SHORTENING: 36 % (ref 28–44)
INTERVENTRICULAR SEPTUM: 0.93 CM (ref 0.6–1.1)
IVRT: 95.15 MSEC
LA MAJOR: 5.11 CM
LA MINOR: 4.43 CM
LA WIDTH: 3.4 CM
LEFT ATRIUM SIZE: 3.53 CM
LEFT ATRIUM VOLUME INDEX: 27.2 ML/M2
LEFT ATRIUM VOLUME: 48.41 CM3
LEFT INTERNAL DIMENSION IN SYSTOLE: 2.68 CM (ref 2.1–4)
LEFT VENTRICLE DIASTOLIC VOLUME INDEX: 44.25 ML/M2
LEFT VENTRICLE DIASTOLIC VOLUME: 78.76 ML
LEFT VENTRICLE MASS INDEX: 67 G/M2
LEFT VENTRICLE SYSTOLIC VOLUME INDEX: 14.9 ML/M2
LEFT VENTRICLE SYSTOLIC VOLUME: 26.55 ML
LEFT VENTRICULAR INTERNAL DIMENSION IN DIASTOLE: 4.2 CM (ref 3.5–6)
LEFT VENTRICULAR MASS: 119.57 G
LV LATERAL E/E' RATIO: 7.44 M/S
LV SEPTAL E/E' RATIO: 11.17 M/S
LVOT MG: 2.45 MMHG
LVOT MV: 0.74 CM/S
MV PEAK A VEL: 0.88 M/S
MV PEAK E VEL: 0.67 M/S
MV STENOSIS PRESSURE HALF TIME: 60.86 MS
MV VALVE AREA P 1/2 METHOD: 3.61 CM2
PULM VEIN S/D RATIO: 1.14
PV PEAK D VEL: 0.44 M/S
PV PEAK S VEL: 0.5 M/S
RA MAJOR: 4.81 CM
RA PRESSURE: 3 MMHG
RA WIDTH: 3.1 CM
RIGHT VENTRICULAR END-DIASTOLIC DIMENSION: 3.98 CM
RIGHT VENTRICULAR LENGTH IN DIASTOLE (APICAL 4-CHAMBER VIEW): 6.44 CM
RV MID DIAMA: 2.32 CM
RV TISSUE DOPPLER FREE WALL SYSTOLIC VELOCITY 1 (APICAL 4 CHAMBER VIEW): 0.02 CM/S
SINUS: 2.59 CM
STJ: 2.31 CM
TDI LATERAL: 0.09 M/S
TDI SEPTAL: 0.06 M/S
TDI: 0.08 M/S
TRICUSPID ANNULAR PLANE SYSTOLIC EXCURSION: 2.57 CM

## 2023-05-11 PROCEDURE — 93356 MYOCRD STRAIN IMG SPCKL TRCK: CPT | Mod: ,,, | Performed by: INTERNAL MEDICINE

## 2023-05-11 PROCEDURE — 93306 TTE W/DOPPLER COMPLETE: CPT | Mod: 26,,, | Performed by: INTERNAL MEDICINE

## 2023-05-11 PROCEDURE — 99215 PR OFFICE/OUTPT VISIT, EST, LEVL V, 40-54 MIN: ICD-10-PCS | Mod: S$PBB,,, | Performed by: NURSE PRACTITIONER

## 2023-05-11 PROCEDURE — 99999 PR PBB SHADOW E&M-EST. PATIENT-LVL IV: CPT | Mod: PBBFAC,,, | Performed by: NURSE PRACTITIONER

## 2023-05-11 PROCEDURE — 99214 OFFICE O/P EST MOD 30 MIN: CPT | Mod: PBBFAC,PN,25 | Performed by: NURSE PRACTITIONER

## 2023-05-11 PROCEDURE — 93356 ECHO (CUPID ONLY): ICD-10-PCS | Mod: ,,, | Performed by: INTERNAL MEDICINE

## 2023-05-11 PROCEDURE — 93306 ECHO (CUPID ONLY): ICD-10-PCS | Mod: 26,,, | Performed by: INTERNAL MEDICINE

## 2023-05-11 PROCEDURE — 99999 PR PBB SHADOW E&M-EST. PATIENT-LVL IV: ICD-10-PCS | Mod: PBBFAC,,, | Performed by: NURSE PRACTITIONER

## 2023-05-11 PROCEDURE — 93356 MYOCRD STRAIN IMG SPCKL TRCK: CPT | Mod: PO

## 2023-05-11 PROCEDURE — 99215 OFFICE O/P EST HI 40 MIN: CPT | Mod: S$PBB,,, | Performed by: NURSE PRACTITIONER

## 2023-05-11 RX ORDER — TRAZODONE HYDROCHLORIDE 50 MG/1
50 TABLET ORAL NIGHTLY
Qty: 30 TABLET | Refills: 2 | Status: SHIPPED | OUTPATIENT
Start: 2023-05-11 | End: 2024-05-10

## 2023-05-11 NOTE — PROGRESS NOTES
"Margaret Rouse  72 y.o. is here to seek an integrative approach to discuss side effects related to breast cancer treatment. Margaret Rouse  was referred by Dr. Camden HDEZ  Mrs. Pulliam is here today with her . She had a left mastectomy with immediate reconstruction. She will start chemotherapy next week. She reports when she was originally diagnosed she was doing well with stress, anxiety, and sleep. In the last few weeks she is not sleeping well. She wakes up around 1 am and then cannot go back to sleep. She states  " I have a nagging nervous stomach that stays with me all the time right now." She states it is bothering her stomach. She states the surgery has taken its toll her on her  and her mind as she cannot garden and be as active as she wants. She reports the mood has been a recent problem in the last few weeks. No hot flashes, no vaginal dryness. She has been on hormone pellets, but is no longer taking them. She loved them and had a good mood and good sleep. She reports a good diet and eats a healthy organic diet.   She has been  for 28 years and they have 7 children between them. She is retired.     7 pillars Assessment    Sleep  How many hours of sleep per night? 4-5 hours then she wakes up   Do you have trouble falling asleep, staying asleep or waking up earlier than you need to? yes  Do you have daytime fatigue? yes  Do you need medication for sleep? no  Do you use any supplements or other interventions for sleep? Yes, Melatonin but it did not help.     Resilience  Rate your current level of stress- high  How do you manage stress?  [place], gardening    Purpose  Do you feel you have a vision or a life purpose? Yes    Environment  Any exposures:no known exposures    Spirituality-  Religion    Nutrition   Food allergies or sensitivities: no  Do you adhere to a particular type of diet? no  Do you have any concerns with your eating habits? no    Exercise  How would you describe your " physical activity level? Moderate to kait  What do you do for physical activity? No organized exercise, she gardens.     Past Medical History  Past Medical History:   Diagnosis Date    Abnormal results of liver function studies     Cancer     left breast cancer    Chest pain     Fatigue     H/O: pneumonia     History of chicken pox     Hyperlipidemia, mixed     Hypertension     Menopausal syndrome     Palpitations 12/15/2015    PONV (postoperative nausea and vomiting)     Thyroid disease         Past Surgical History   Past Surgical History:   Procedure Laterality Date    BREAST RECONSTRUCTION Left 4/20/2023    Procedure: RECONSTRUCTION, BREAST;  Surgeon: Pipo Bro MD;  Location: [facility] OR;  Service: Plastics;  Laterality: Left;    CATARACT EXTRACTION W/ INTRAOCULAR LENS  IMPLANT, BILATERAL      COLONOSCOPY      COSMETIC SURGERY      DEBRIDEMENT AND CLOSURE OF WOUND OF FINGER Left 11/29/2018    Procedure: DEBRIDEMENT, WOUND, FINGER, WITH CLOSURE, VY FLap;  Surgeon: Frederic Barbour MD;  Location: [facility] OR;  Service: Plastics;  Laterality: Left;    INSERTION OF BREAST IMPLANT Left 4/20/2023    Procedure: INSERTION, BREAST IMPLANT;  Surgeon: Pipo Bro MD;  Location: [facility] OR;  Service: Plastics;  Laterality: Left;    KNEE SURGERY Right 2015    LEFT HEART CATHETERIZATION Left 11/18/2021    Procedure: CATHETERIZATION, HEART, LEFT;  Surgeon: Basim Castrejon MD;  Location: [facility] CATH;  Service: Cardiology;  Laterality: Left;    PLACEMENT OF ACELLULAR HUMAN DERMAL ALLOGRAFT Left 4/20/2023    Procedure: APPLICATION, ACELLULAR HUMAN DERMAL ALLOGRAFT;  Surgeon: Pipo Bro MD;  Location: [facility] OR;  Service: Plastics;  Laterality: Left;    SENTINEL LYMPH NODE BIOPSY Left 4/20/2023    Procedure: BIOPSY, LYMPH NODE, SENTINEL;  Surgeon: Amanda Mcclellan MD;  Location: [facility] OR;  Service: General;  Laterality: Left;    SIMPLE MASTECTOMY Left 4/20/2023    Procedure: MASTECTOMY, SIMPLE;  Surgeon: Amanda Mcclellan MD;   Location: [facility] OR;  Service: General;  Laterality: Left;    TONSILLECTOMY  1955      Family History   Family History   Problem Relation Age of Onset    No Known Problems Mother     Heart disease Father     Heart attack Father         at age 64    Cancer Sister         folicular non-hodgkins lymphoma    Lymphoma Sister     Diabetes Maternal Grandmother       Social History  Social History     Socioeconomic History    Marital status:    Tobacco Use    Smoking status: Former    Smokeless tobacco: Never    Tobacco comments:     50 years ago   Substance and Sexual Activity    Alcohol use: No     Comment: rarely    Drug use: No    Sexual activity: Yes      Allergies  Review of patient's allergies indicates:  No Known Allergies   Current Medications:    Current Outpatient Medications:     ARMOUR THYROID 30 mg Tab, once daily., Disp: , Rfl:     aspirin (ECOTRIN) 81 MG EC tablet, Take 81 mg by mouth once daily., Disp: , Rfl:     cefadroxil (DURICEF) 500 MG Cap, TAKE 1 CAPSULE BY MOUTH TWICE PER DAY, Disp: , Rfl:     cyanocobalamin, vitamin B-12, 3,000 mcg Cap, Take by mouth once daily. Triple Blend with folate 680mcg, Disp: , Rfl:     DIINDOLYLMETHANE, BULK, MISC, 100 mg by Misc.(Non-Drug; Combo Route) route once daily., Disp: , Rfl:     fluticasone propionate (FLONASE) 50 mcg/actuation nasal spray, 1 spray by Each Nostril route once daily., Disp: , Rfl:     KRILL OIL ORAL, Take 2,000 mg by mouth once daily., Disp: , Rfl:     losartan (COZAAR) 25 MG tablet, once daily., Disp: , Rfl:     magnesium 200 mg Tab, Take 400 mg by mouth once daily., Disp: , Rfl:     metFORMIN (GLUCOPHAGE) 500 MG tablet, Take 500 mg by mouth 2 (two) times daily., Disp: , Rfl:     mupirocin (BACTROBAN) 2 % ointment, SMARTSIG:Both Nares, Disp: , Rfl:     ondansetron (ZOFRAN-ODT) 4 MG TbDL, Take 1 tablet (4 mg total) by mouth every 8 (eight) hours as needed (nausea). (Patient not taking: Reported on 5/4/2023), Disp: 12 tablet, Rfl: 0     "rosuvastatin (CRESTOR) 20 MG tablet, Take 20 mg by mouth once daily., Disp: , Rfl:     VITAMIN A ORAL, Take 5,000 Int'l Units/L by mouth once daily., Disp: , Rfl:     vitamin K2 45 mcg Cap, Take by mouth once daily., Disp: , Rfl:     zinc gluconate 50 mg tablet, Take 25 mg by mouth once daily., Disp: , Rfl:     ZINC ORAL, Take 1 tablet by mouth once daily. Zinc 22mg with Quercetin 800mg combined, Disp: , Rfl:   No current facility-administered medications for this visit.    Facility-Administered Medications Ordered in Other Visits:     0.9%  NaCl infusion, , Intravenous, Continuous, Marissa Lei NP     Review of Systems  Review of Systems   Constitutional: Negative.    HENT: Negative.     Eyes: Negative.    Respiratory: Negative.     Cardiovascular: Negative.    Gastrointestinal: Negative.    Genitourinary: Negative.    Musculoskeletal: Negative.    Skin: Negative.    Neurological: Negative.    Endo/Heme/Allergies: Negative.    Psychiatric/Behavioral:  The patient is nervous/anxious and has insomnia.       Physical Exam      Vitals:    05/11/23 0855   BP: 124/81   Pulse: 90   Temp: 98.4 °F (36.9 °C)   TempSrc: Temporal   SpO2: 95%   Weight: 69.2 kg (152 lb 8 oz)   Height: 5' 6" (1.676 m)     Body mass index is 24.61 kg/m².  Physical Exam  Vitals reviewed.   Constitutional:       Appearance: Normal appearance.   Neurological:      Mental Status: She is alert.   Psychiatric:         Mood and Affect: Mood normal.         Behavior: Behavior normal.      ASSESSMENT :  1. Insomnia, unspecified type    2. Invasive ductal carcinoma of left breast in female    3. Anxiety about health       PLAN:  Reviewed all information discussed at today's visit and all questions were answered.    Counseled on healthy lifestyle and behavior modifications: Continue healthy eating, encouraged light walking now and increase exercising   See Nutrition as needed during treatment    Counseled on sleep hygiene: Recommended insight " timer  Trazodone 50 mg nightly as needed for sleep.   PT lymphedema follow up to be scheduled  I discussed and recommended the following support services:  [employer] and Yoga   Music and Relaxation therapy   Meditation  [employer]     Follow up with [facility] in 1 month chairside.    I spent a total of 50 minutes on the day of the visit.This includes face to face time and non-face to face time preparing to see the patient (eg, review of tests), obtaining and/or reviewing separately obtained history, documenting clinical information in the electronic or other health record, independently interpreting results and communicating results to the patient/family/caregiver, or care coordinator.

## 2023-05-12 ENCOUNTER — CLINICAL SUPPORT (OUTPATIENT)
Dept: HEMATOLOGY/ONCOLOGY | Facility: CLINIC | Age: 73
End: 2023-05-12
Payer: MEDICARE

## 2023-05-12 ENCOUNTER — DOCUMENTATION ONLY (OUTPATIENT)
Dept: INFUSION THERAPY | Facility: HOSPITAL | Age: 73
End: 2023-05-12
Payer: MEDICARE

## 2023-05-12 VITALS
SYSTOLIC BLOOD PRESSURE: 129 MMHG | WEIGHT: 153.69 LBS | HEIGHT: 66 IN | HEART RATE: 72 BPM | DIASTOLIC BLOOD PRESSURE: 77 MMHG | TEMPERATURE: 97 F | BODY MASS INDEX: 24.7 KG/M2 | OXYGEN SATURATION: 95 % | RESPIRATION RATE: 16 BRPM

## 2023-05-12 DIAGNOSIS — D05.12 DUCTAL CARCINOMA IN SITU (DCIS) OF LEFT BREAST: ICD-10-CM

## 2023-05-12 DIAGNOSIS — D05.12 DUCTAL CARCINOMA IN SITU (DCIS) OF LEFT BREAST: Primary | ICD-10-CM

## 2023-05-12 DIAGNOSIS — C50.912 INVASIVE DUCTAL CARCINOMA OF LEFT BREAST IN FEMALE: Primary | ICD-10-CM

## 2023-05-12 PROCEDURE — 99215 PR OFFICE/OUTPT VISIT, EST, LEVL V, 40-54 MIN: ICD-10-PCS | Mod: S$PBB,,,

## 2023-05-12 PROCEDURE — 99999 PR PBB SHADOW E&M-EST. PATIENT-LVL V: ICD-10-PCS | Mod: PBBFAC,,,

## 2023-05-12 PROCEDURE — 99215 OFFICE O/P EST HI 40 MIN: CPT | Mod: S$PBB,,,

## 2023-05-12 PROCEDURE — 99999 PR PBB SHADOW E&M-EST. PATIENT-LVL V: CPT | Mod: PBBFAC,,,

## 2023-05-12 PROCEDURE — 99215 OFFICE O/P EST HI 40 MIN: CPT | Mod: PBBFAC,PN

## 2023-05-12 RX ORDER — PROMETHAZINE HYDROCHLORIDE 25 MG/1
25 TABLET ORAL EVERY 6 HOURS PRN
Qty: 30 TABLET | Refills: 0 | Status: SHIPPED | OUTPATIENT
Start: 2023-05-12 | End: 2024-02-08

## 2023-05-12 NOTE — PROGRESS NOTES
Pt was anxious to have a wig fitting. MARIAH was able to see pt after her chemo school. Pt thought she wanted a grey/white wig in the boutique but decided against it. She would like a wig closer to her hair color. MARIAH provided pt with the [employer] catalog to review. MARIAH will provide a $75 voucher toward the cost of a wig of her choice form [employer].  Pt will get back with MARIAH when she finds a wig she likes.    MARIAH was able to provide pt with a chemo beanie and several donated beanies. Pt thanked MARIAH for her assistance.       Mary Lee, CELESTINAW

## 2023-05-12 NOTE — PROGRESS NOTES
PATIENT: Margaret Rouse  MRN: [id number]  DATE: 5/12/2023      Diagnosis:   1. Invasive ductal carcinoma of left breast in female    2. Ductal carcinoma in situ (DCIS) of left breast        Chief Complaint: Invasive ductal carcinoma of left breast in female (Chemo class)    Subjective:    Interval History: Ms. Rouse is a 72 y.o. female who is here today for education and discussion prior to starting treatment with weekly Paclitaxel and Trastuzumab, followed by maintenance Trastuzumab for a diagnosis of invasive ductal carcinoma of the left breast. She is accompanied by her spouse.    Patient met with [facility] on 5/11/23.   ECHO was done 5/11/23 with EF 65%  Has a prescription for Ondansetron at home for PRN use.  No other new complaints or pertinent findings on a 10-point review of systems.     Oncology History:   2/23/23 Seen for a newly diagnosed stage IA IDC of the left breast. MRI Breasts: clumped non-mass enhancement with associated clip 3.1 x 1.2 x 2.9cm with sub-cm satellites inferiorly up to 6mm at 1.6cm inferior.  No abnormal SARAH. Biopsy positive for IDC/DCIS, ER; low positive, NE: negative, no HER-2 since was insufficient invasive tumor for IDC and was run on the DCIS part.      Oncology History   Invasive ductal carcinoma of left breast in female   2/27/2023 Initial Diagnosis    Invasive ductal carcinoma of left breast in female       2/27/2023 Cancer Staged    Staging form: Breast, AJCC 8th Edition  - Clinical stage from 2/27/2023: cT1mi, cN0, cM0, G3, ER+, NE-, HER2: Not Assessed       5/1/2023 Cancer Staged    Staging form: Breast, AJCC 8th Edition  - Pathologic stage from 5/1/2023: Stage IA (pT1a, pN0(sn), cM0, G2, ER-, NE-, HER2+)       5/4/2023 -  Chemotherapy    Treatment Summary   Plan Name: OP BREAST TRASTUZUMAB PACLITAXEL WEEKLY  Treatment Goal: Curative  Status: Active  Start Date: 5/4/2023 (Planned)  End Date: 4/4/2024 (Planned)  Provider: Alexandra Hannah MD  Chemotherapy:  PACLITAXEL INFUSION, 80 mg/m2, Intravenous, Clinic/HOD 1 time, 0 of 12 cycles  TRASTUZUMAB-DKST INFUSION, 4 mg/kg, Intravenous, Clinic/HOD 1 time, 0 of 25 cycles       Ductal carcinoma in situ (DCIS) of left breast   3/5/2023 Initial Diagnosis    Ductal carcinoma in situ (DCIS) of left breast       5/4/2023 -  Chemotherapy    Treatment Summary   Plan Name: OP BREAST TRASTUZUMAB PACLITAXEL WEEKLY  Treatment Goal: Curative  Status: Active  Start Date: 5/4/2023 (Planned)  End Date: 4/4/2024 (Planned)  Provider: Alexandra Hannah MD  Chemotherapy: PACLITAXEL INFUSION, 80 mg/m2, Intravenous, Clinic/HOD 1 time, 0 of 12 cycles  TRASTUZUMAB-DKST INFUSION, 4 mg/kg, Intravenous, Clinic/HOD 1 time, 0 of 25 cycles          Past Medical History:   Past Medical History:   Diagnosis Date    Abnormal results of liver function studies     Cancer     left breast cancer    Chest pain     Fatigue     H/O: pneumonia     History of chicken pox     Hyperlipidemia, mixed     Hypertension     Menopausal syndrome     Palpitations 12/15/2015    PONV (postoperative nausea and vomiting)     Thyroid disease        Past Surgical HIstory:   Past Surgical History:   Procedure Laterality Date    BREAST RECONSTRUCTION Left 4/20/2023    Procedure: RECONSTRUCTION, BREAST;  Surgeon: Pipo Bro MD;  Location: [facility] OR;  Service: Plastics;  Laterality: Left;    CATARACT EXTRACTION W/ INTRAOCULAR LENS  IMPLANT, BILATERAL      COLONOSCOPY      COSMETIC SURGERY      DEBRIDEMENT AND CLOSURE OF WOUND OF FINGER Left 11/29/2018    Procedure: DEBRIDEMENT, WOUND, FINGER, WITH CLOSURE, VY FLap;  Surgeon: Frederic Barbour MD;  Location: [facility] OR;  Service: Plastics;  Laterality: Left;    INSERTION OF BREAST IMPLANT Left 4/20/2023    Procedure: INSERTION, BREAST IMPLANT;  Surgeon: Pipo Bro MD;  Location: [facility] OR;  Service: Plastics;  Laterality: Left;    KNEE SURGERY Right 2015    LEFT HEART CATHETERIZATION Left 11/18/2021    Procedure: CATHETERIZATION, HEART,  LEFT;  Surgeon: Basim Castrejon MD;  Location: [facility] CATH;  Service: Cardiology;  Laterality: Left;    PLACEMENT OF ACELLULAR HUMAN DERMAL ALLOGRAFT Left 4/20/2023    Procedure: APPLICATION, ACELLULAR HUMAN DERMAL ALLOGRAFT;  Surgeon: Pipo Bro MD;  Location: [facility] OR;  Service: Plastics;  Laterality: Left;    SENTINEL LYMPH NODE BIOPSY Left 4/20/2023    Procedure: BIOPSY, LYMPH NODE, SENTINEL;  Surgeon: Amanda Mcclellan MD;  Location: [facility] OR;  Service: General;  Laterality: Left;    SIMPLE MASTECTOMY Left 4/20/2023    Procedure: MASTECTOMY, SIMPLE;  Surgeon: Amanda Mcclellan MD;  Location: [facility] OR;  Service: General;  Laterality: Left;    TONSILLECTOMY  1955       Family History:   Family History   Problem Relation Age of Onset    No Known Problems Mother     Heart disease Father     Heart attack Father         at age 64    Cancer Sister         folicular non-hodgkins lymphoma    Lymphoma Sister     Diabetes Maternal Grandmother        Social History:  reports that she has quit smoking. She has never used smokeless tobacco. She reports that she does not drink alcohol and does not use drugs.    Allergies:  Review of patient's allergies indicates:  No Known Allergies    Medications:  Current Outpatient Medications   Medication Sig Dispense Refill    ARMOUR THYROID 30 mg Tab once daily.      cyanocobalamin, vitamin B-12, 3,000 mcg Cap Take by mouth once daily. Triple Blend with folate 680mcg      fluticasone propionate (FLONASE) 50 mcg/actuation nasal spray 1 spray by Each Nostril route once daily.      KRILL OIL ORAL Take 2,000 mg by mouth once daily.      losartan (COZAAR) 25 MG tablet once daily.      magnesium 200 mg Tab Take 400 mg by mouth once daily.      metFORMIN (GLUCOPHAGE) 500 MG tablet Take 500 mg by mouth 2 (two) times daily.      mupirocin (BACTROBAN) 2 % ointment SMARTSIG:Both Nares      rosuvastatin (CRESTOR) 20 MG tablet Take 20 mg by mouth once daily.      traZODone (DESYREL) 50 MG tablet  Take 1 tablet (50 mg total) by mouth nightly. 30 tablet 2    VITAMIN A ORAL Take 5,000 Int'l Units/L by mouth once daily.      vitamin K2 45 mcg Cap Take by mouth once daily.      zinc gluconate 50 mg tablet Take 25 mg by mouth once daily.      aspirin (ECOTRIN) 81 MG EC tablet Take 81 mg by mouth once daily.      duke's soln (benadryl 30 mL, mylanta 30 mL, LIDOcaine 30 mL, nystatin 30 mL) 120mL Take 10 mLs by mouth 4 (four) times daily. 120 mL 0    ondansetron (ZOFRAN-ODT) 4 MG TbDL Take 1 tablet (4 mg total) by mouth every 8 (eight) hours as needed (nausea). (Patient not taking: Reported on 5/4/2023) 12 tablet 0    promethazine (PHENERGAN) 25 MG tablet Take 1 tablet (25 mg total) by mouth every 6 (six) hours as needed for Nausea. 30 tablet 0    ZINC ORAL Take 1 tablet by mouth once daily. Zinc 22mg with Quercetin 800mg combined       No current facility-administered medications for this visit.     Facility-Administered Medications Ordered in Other Visits   Medication Dose Route Frequency Provider Last Rate Last Admin    0.9%  NaCl infusion   Intravenous Continuous Marissa Lei NP           Review of Systems   Constitutional:  Negative for appetite change and unexpected weight change.   HENT:  Negative for mouth sores.    Eyes:  Negative for visual disturbance.   Respiratory:  Negative for cough and shortness of breath.    Cardiovascular:  Negative for chest pain.   Gastrointestinal:  Negative for abdominal pain and diarrhea.   Genitourinary:  Negative for frequency.   Musculoskeletal:  Negative for back pain.   Skin:  Negative for rash.   Neurological:  Negative for headaches.   Hematological:  Negative for adenopathy.   Psychiatric/Behavioral:  The patient is nervous/anxious.      ECOG Performance Status:   ECOG SCORE    0 - Fully active-able to carry on all pre-disease performance without restriction         Objective:      Vitals:   Vitals:    05/12/23 1025   BP: 129/77   BP Location: Right arm   Patient  "Position: Sitting   BP Method: Medium (Automatic)   Pulse: 72   Resp: 16   Temp: 97.3 °F (36.3 °C)   TempSrc: Temporal   SpO2: 95%   Weight: 69.7 kg (153 lb 10.6 oz)   Height: 5' 6" (1.676 m)     BMI: Body mass index is 24.8 kg/m².    Physical Exam  Vitals reviewed.   Constitutional:       General: She is not in acute distress.     Appearance: She is not diaphoretic.   HENT:      Head: Normocephalic and atraumatic.   Eyes:      General: No scleral icterus.  Pulmonary:      Effort: Pulmonary effort is normal. No respiratory distress.   Skin:     Coloration: Skin is not jaundiced.   Neurological:      Mental Status: She is alert and oriented to person, place, and time.   Psychiatric:         Mood and Affect: Mood normal.         Behavior: Behavior normal.       Laboratory Data:  Lab Results   Component Value Date    WBC 6.63 04/13/2023    HGB 12.8 04/13/2023    HCT 40.4 04/13/2023    MCV 95 04/13/2023     04/13/2023          Imaging:   Result:   MRI Breast w/wo Contrast, w/CAD, Bilateral     History:  Patient is 72 y.o. and was called back from a screening mammogram for new calcifications seen in the upper outer quadrant of the left breast in the posterior depth.     Diagnostic workup 2/9/23 described " new, pleomorphic calcifications in the left upper outer posterior breast spanning 25 mm on the CC magnification view.  There is another smaller group of similar calcifications about 20 mm anterosuperior to the first group spanning 5 mm."     Stereotactic biopsy pathology 2/13/23:  LEFT UPPER OUTER POSTERIOR BREAST CORE NEEDLE BIOPSIES:   - IN SITU DUCT CARCINOMA, COMEDO TYPE, WITH A 0.1 CM FOCUS OF INVASION.   ER: LOW POSITIVE, IA: NEGATIVE     COMMENT: INSUFFICIENT INVASIVE TUMOR FOR BREAST PANEL TESTING. RESULTS REPORTED ARE FOR IN SITU CARCINOMA ONLY.      Evaluate for extent of disease and for occult contralateral malignancy.     No family history.       Films Compared:  Compared to: 02/13/2023 Mammo Breast " Stereotactic Biopsy Left, 02/13/2023 Mammo Digital Diagnostic Left, 02/09/2023 Mammo Digital Diagnostic Bilat with Seferino, 02/09/2023 US Breast Left Complete, and 02/07/2023 Mammo Digital Screening Bilat w/ Seferino     Technique:  A routine breast MRI was performed with a dedicated breast coil. Pre-contrast STIR were acquired. Then, pre and post contrast T1 weighted fat saturated images were acquired and subtracted with MIP reconstruction. 15 ml of intravenous gadolinium contrast was administered. The study was reviewed with Chipolo software.     Findings:  The breasts have heterogeneous fibroglandular tissue. The background parenchymal enhancement is marked and asymmetric.      There are bilateral cysts.     Left breast   At the left upper outer mid-posterior depth, there is discontiguous, clumped, non mass enhancement with an associated biopsy clip in a segmental pattern spanning 31 mm anteroposterior dimension x 12 mm transverse x 29 mm superoinferior dimension. This is 12 mm from the lateral skin and 29 mm from the pectoralis. There are subcentimeter satellites inferiorly, including 16 mm anteroinferiorly, there is an irregular satellite mass measuring 6 x 5 x 4 mm.      Right breast   No suspicious enhancing lesions are seen.      No abnormal internal mammary or axillary lymph nodes are seen.     Impression:  Biopsy proven malignancy at the left upper outer mid-posterior breast with discontiguous, clumped, non mass enhancement in a segmental pattern spanning 31 mm x 12 mm x 29 mm. There are subcentimeter satellites inferiorly.     BI-RADS Category:   Overall: 6 - Known Biopsy-Proven Malignancy     Recommendation:  Surgical Consult is recommended for the left breast.     Your estimated lifetime risk of breast cancer (to age 85) based on Tyrer-Cuzick risk assessment model is Tyrer-Cuzick: 2.85 %. According to the American Cancer Society, patients with a lifetime breast cancer risk of 20% or higher might benefit from  supplemental screening tests.   Assessment:       1. Invasive ductal carcinoma of left breast in female    2. Ductal carcinoma in situ (DCIS) of left breast         Plan:   Invasive ductal carcinoma of the left breast  Chemotherapy Education   -Treatment plan of  Herceptin 4-2 mg/kg IV day 1 for 12 cycles, paclitaxel 80 mg/m2 IV day 1 for 12 cycles. Followed by Trastuzumab Maintenance 6 mg/kg IV q21 Days x 13 Cycles discussed with patient and family.  -Handouts provided on chemotherapy agents.    -Discussed the mechanism of action, potential side effects of this treatment as well as ways to manage them at home. Some of these side effects include but not limited to fever, nausea, vomiting, decreased appetite, fatigue, weakness, cytopenia's, myalgia/arthralgia, constipation, diarrhea, bleeding, headache, nail changes, taste change, hair thinning/loss, peripheral neuropathy, kidney toxicity, or ototoxicity.   -Dietary modifications discussed.  Detail instructions to be provided by dietician.   -Chemotherapy Binder supplied with contact information.   -Discussed follow-up with the physician for toxicity monitoring throughout treatment.    -No refill requests at this time; prescription for Kalen JOHNSON Phenergan 25 mg po for PRN use was sent to pharmacy.   -Chemo  referral entered today   -Met with [facility] 5/11/23  -The patient and family verbalized understanding. Consented the patient to the treatment plan and the patient was educated on the planned duration of the treatment and schedule of the treatment administration.      Assessment/Plan reviewed and approved by Dr. Hannah.    60 minutes were spent in coordination of patient's care, record review and counseling.    [facility]   Integrative Consulted 5/11/23   Palliative Care    Psychology    ACP    Chemo Care Companion Referral entered 5/12/23      Route Chart for Scheduling    Med Onc Chart Routing      Follow up with  physician Other. As planned. Patient would like to combine Chemo /Obar appointment with wig appointment if possible   Follow up with SHADI    Infusion scheduling note    Injection scheduling note    Labs    Imaging    Pharmacy appointment    Other referrals           Treatment Plan Information   OP BREAST TRASTUZUMAB PACLITAXEL WEEKLY   Alexandra Hannah MD   Upcoming Treatment Dates - OP BREAST TRASTUZUMAB PACLITAXEL WEEKLY    5/4/2023       Pre-Medications       diphenhydrAMINE (BENADRYL) 50 mg in NS 50 mL IVPB       dexAMETHasone (DECADRON) 10 mg in sodium chloride 0.9% 50 mL IVPB       famotidine (PF) injection 20 mg       Chemotherapy       trastuzumab-dkst (OGIVRI) 278 mg in sodium chloride 0.9% 250 mL chemo infusion       PACLitaxeL (TAXOL) 80 mg/m2 = 144 mg in sodium chloride 0.9% chemo infusion  5/11/2023       Pre-Medications       diphenhydrAMINE (BENADRYL) 50 mg in NS 50 mL IVPB       dexAMETHasone (DECADRON) 10 mg in sodium chloride 0.9% 50 mL IVPB       famotidine (PF) injection 20 mg       Chemotherapy       trastuzumab-dkst (OGIVRI) 139 mg in sodium chloride 0.9% 250 mL chemo infusion       PACLitaxeL (TAXOL) 80 mg/m2 = 144 mg in sodium chloride 0.9% chemo infusion  5/18/2023       Pre-Medications       diphenhydrAMINE (BENADRYL) 50 mg in NS 50 mL IVPB       dexAMETHasone (DECADRON) 10 mg in sodium chloride 0.9% 50 mL IVPB       famotidine (PF) injection 20 mg       Chemotherapy       trastuzumab-dkst (OGIVRI) 139 mg in sodium chloride 0.9% 250 mL chemo infusion       PACLitaxeL (TAXOL) 80 mg/m2 = 144 mg in sodium chloride 0.9% chemo infusion  5/25/2023       Pre-Medications       diphenhydrAMINE (BENADRYL) 50 mg in NS 50 mL IVPB       dexAMETHasone (DECADRON) 10 mg in sodium chloride 0.9% 50 mL IVPB       famotidine (PF) injection 20 mg       Chemotherapy       trastuzumab-dkst (OGIVRI) 139 mg in sodium chloride 0.9% 250 mL chemo infusion       PACLitaxeL (TAXOL) 80 mg/m2 = 144 mg in sodium  chloride 0.9% chemo infusion

## 2023-05-12 NOTE — PROGRESS NOTES
Oncology Nutrition   New Patient Education  Margaret Rouse   1950    Nutrition Education   This is a 72 y.o.female with a medical diagnosis of breast cancer.     Met w/ pt to discuss current nutritional status and nutrition as it relates to cancer and cancer treatment. Pt currently low nutrition risk. Pt's spouse, Jeff, present at time of education. Pt states she has had a nervous stomach recently. She states her stomach feels like it is in a knot. Pt with a lot of nutrition questions regarding food safety and protein intake. RD answered all questions to patients satisfaction. RD discussed role in POC.    MICHELLE and LCSWMary, provided pt with a tour of infusion.     Wt Readings from Last 10 Encounters:   05/12/23 69.7 kg (153 lb 10.6 oz)   05/11/23 68.9 kg (152 lb)   05/11/23 69.2 kg (152 lb 8 oz)   05/04/23 69.6 kg (153 lb 7 oz)   04/13/23 71.2 kg (156 lb 15.5 oz)   02/27/23 70.2 kg (154 lb 12.2 oz)   11/18/21 67.1 kg (148 lb)   11/29/18 66.7 kg (147 lb 0.8 oz)   11/29/18 66.7 kg (147 lb 0.8 oz)   10/23/17 63.5 kg (140 lb)      [x] PMHx reviewed  [x] Labs reviewed    Educated on food safety and common nutrition impact symptoms associated with chemotherapy treatment. Reinforced the importance of good hydration. Handouts provided.    Answered all nutrition related questions.     Patient provided with dietitian contact number and advised to call with questions or make future appointment if further intervention is needed. RD to follow throughout tx prn.    Britt Laureano RDN, LDN  05/12/2023  2:41 PM

## 2023-05-12 NOTE — PROGRESS NOTES
Oncology   Chemotherapy School Education  Margaret Rouse   1950    Social Service Education   This is a 72 y.o.female with a medical diagnosis of  breast cancer. Pt did say she is worried about hair loss. SW provided emotional support and made arrangements to meet with pt for a wig fitting after chemo school. Pt did says he has been coping well until recently. She said as treatment approached she has found she has a nervous stomach. She was told about psychology services but said did not need at this time. SW offered emotional support.     Current Support System: Pt is here today with her ,Jeff. They both report they have good supports. They said they lived near by and did  not foresee and difficulties getting to and from treatment.     Met with pt for new pt education. Reviewed role of  during cancer treatment. Educated on role of SW on care team and resources available at the cancer center.     Answered all psychosocial/socioeconomic related questions. Pt did not report any financial concerns at this time. They said they have [employer] and a secondary insurance. They were not sure what if any bills they would receive. SW [facility] them contact information [facility]. Pt was given a tour of the infusion center.     Patient provided with social contact number and advised to call with questions or make future appointment if further intervention is needed. SW to follow throughout tx.    Mary Lee, HERMANN  05/12/2023  3:12 PM

## 2023-05-13 ENCOUNTER — PATIENT MESSAGE (OUTPATIENT)
Dept: HEMATOLOGY/ONCOLOGY | Facility: CLINIC | Age: 73
End: 2023-05-13
Payer: MEDICARE

## 2023-05-15 ENCOUNTER — TELEPHONE (OUTPATIENT)
Dept: INFUSION THERAPY | Facility: HOSPITAL | Age: 73
End: 2023-05-15
Payer: MEDICARE

## 2023-05-15 NOTE — TELEPHONE ENCOUNTER
SW received a message pt requested assistance with her wig choice. SW called, no answer. SW left a message for a return call.    Mary Lee, CELESTINAW

## 2023-05-15 NOTE — TELEPHONE ENCOUNTER
PT returned SW's call. Pt had decided on a wig from the [employer] catalog. Per pt's request SW placed order for for the [employer] wig MARTHA Gant in [facility] [place] [id number]. SW will apply a $75 [employer] voucher toward the purchase price and pt is paying for the remaining cost. Pt's cost is $80.01   Order # [id number]    Mary Lee [facility]

## 2023-05-19 ENCOUNTER — PATIENT MESSAGE (OUTPATIENT)
Dept: ADMINISTRATIVE | Facility: OTHER | Age: 73
End: 2023-05-19
Payer: MEDICARE

## 2023-05-19 ENCOUNTER — LAB VISIT (OUTPATIENT)
Dept: LAB | Facility: HOSPITAL | Age: 73
End: 2023-05-19
Attending: STUDENT IN AN ORGANIZED HEALTH CARE EDUCATION/TRAINING PROGRAM
Payer: MEDICARE

## 2023-05-19 ENCOUNTER — DOCUMENTATION ONLY (OUTPATIENT)
Dept: PHARMACY | Facility: AMBULARY SURGERY CENTER | Age: 73
End: 2023-05-19
Payer: MEDICARE

## 2023-05-19 ENCOUNTER — OFFICE VISIT (OUTPATIENT)
Dept: HEMATOLOGY/ONCOLOGY | Facility: CLINIC | Age: 73
End: 2023-05-19
Payer: MEDICARE

## 2023-05-19 VITALS
BODY MASS INDEX: 24.63 KG/M2 | HEART RATE: 79 BPM | HEIGHT: 66 IN | TEMPERATURE: 98 F | DIASTOLIC BLOOD PRESSURE: 78 MMHG | OXYGEN SATURATION: 97 % | SYSTOLIC BLOOD PRESSURE: 130 MMHG | WEIGHT: 153.25 LBS | RESPIRATION RATE: 18 BRPM

## 2023-05-19 DIAGNOSIS — C50.912 INVASIVE DUCTAL CARCINOMA OF LEFT BREAST IN FEMALE: ICD-10-CM

## 2023-05-19 DIAGNOSIS — G47.00 INSOMNIA, UNSPECIFIED TYPE: ICD-10-CM

## 2023-05-19 DIAGNOSIS — R45.89 ANXIETY ABOUT HEALTH: ICD-10-CM

## 2023-05-19 DIAGNOSIS — C50.912 INVASIVE DUCTAL CARCINOMA OF LEFT BREAST IN FEMALE: Primary | ICD-10-CM

## 2023-05-19 LAB
ALBUMIN SERPL BCP-MCNC: 4.2 G/DL (ref 3.5–5.2)
ALP SERPL-CCNC: 44 U/L (ref 55–135)
ALT SERPL W/O P-5'-P-CCNC: 21 U/L (ref 10–44)
ANION GAP SERPL CALC-SCNC: 9 MMOL/L (ref 8–16)
AST SERPL-CCNC: 19 U/L (ref 10–40)
BASOPHILS # BLD AUTO: 0.05 K/UL (ref 0–0.2)
BASOPHILS NFR BLD: 0.6 % (ref 0–1.9)
BILIRUB SERPL-MCNC: 0.6 MG/DL (ref 0.1–1)
BUN SERPL-MCNC: 19 MG/DL (ref 8–23)
CALCIUM SERPL-MCNC: 9.9 MG/DL (ref 8.7–10.5)
CHLORIDE SERPL-SCNC: 107 MMOL/L (ref 95–110)
CO2 SERPL-SCNC: 25 MMOL/L (ref 23–29)
CREAT SERPL-MCNC: 0.9 MG/DL (ref 0.5–1.4)
DIFFERENTIAL METHOD: ABNORMAL
EOSINOPHIL # BLD AUTO: 0.4 K/UL (ref 0–0.5)
EOSINOPHIL NFR BLD: 4.6 % (ref 0–8)
ERYTHROCYTE [DISTWIDTH] IN BLOOD BY AUTOMATED COUNT: 12.2 % (ref 11.5–14.5)
EST. GFR  (NO RACE VARIABLE): >60 ML/MIN/1.73 M^2
GLUCOSE SERPL-MCNC: 106 MG/DL (ref 70–110)
HCT VFR BLD AUTO: 41.7 % (ref 37–48.5)
HGB BLD-MCNC: 13.7 G/DL (ref 12–16)
IMM GRANULOCYTES # BLD AUTO: 0.01 K/UL (ref 0–0.04)
IMM GRANULOCYTES NFR BLD AUTO: 0.1 % (ref 0–0.5)
LYMPHOCYTES # BLD AUTO: 2.8 K/UL (ref 1–4.8)
LYMPHOCYTES NFR BLD: 35.1 % (ref 18–48)
MCH RBC QN AUTO: 31.3 PG (ref 27–31)
MCHC RBC AUTO-ENTMCNC: 32.9 G/DL (ref 32–36)
MCV RBC AUTO: 95 FL (ref 82–98)
MONOCYTES # BLD AUTO: 0.8 K/UL (ref 0.3–1)
MONOCYTES NFR BLD: 9.4 % (ref 4–15)
NEUTROPHILS # BLD AUTO: 4.1 K/UL (ref 1.8–7.7)
NEUTROPHILS NFR BLD: 50.2 % (ref 38–73)
NRBC BLD-RTO: 0 /100 WBC
PLATELET # BLD AUTO: 189 K/UL (ref 150–450)
PMV BLD AUTO: 9.3 FL (ref 9.2–12.9)
POTASSIUM SERPL-SCNC: 5.1 MMOL/L (ref 3.5–5.1)
PROT SERPL-MCNC: 7.1 G/DL (ref 6–8.4)
RBC # BLD AUTO: 4.38 M/UL (ref 4–5.4)
SODIUM SERPL-SCNC: 141 MMOL/L (ref 136–145)
WBC # BLD AUTO: 8.1 K/UL (ref 3.9–12.7)

## 2023-05-19 PROCEDURE — 99999 PR PBB SHADOW E&M-EST. PATIENT-LVL IV: CPT | Mod: PBBFAC,,, | Performed by: STUDENT IN AN ORGANIZED HEALTH CARE EDUCATION/TRAINING PROGRAM

## 2023-05-19 PROCEDURE — 99215 OFFICE O/P EST HI 40 MIN: CPT | Mod: S$PBB,,, | Performed by: STUDENT IN AN ORGANIZED HEALTH CARE EDUCATION/TRAINING PROGRAM

## 2023-05-19 PROCEDURE — 99999 PR PBB SHADOW E&M-EST. PATIENT-LVL IV: ICD-10-PCS | Mod: PBBFAC,,, | Performed by: STUDENT IN AN ORGANIZED HEALTH CARE EDUCATION/TRAINING PROGRAM

## 2023-05-19 PROCEDURE — 36415 COLL VENOUS BLD VENIPUNCTURE: CPT | Mod: PN | Performed by: STUDENT IN AN ORGANIZED HEALTH CARE EDUCATION/TRAINING PROGRAM

## 2023-05-19 PROCEDURE — 80053 COMPREHEN METABOLIC PANEL: CPT | Mod: PN | Performed by: STUDENT IN AN ORGANIZED HEALTH CARE EDUCATION/TRAINING PROGRAM

## 2023-05-19 PROCEDURE — 85025 COMPLETE CBC W/AUTO DIFF WBC: CPT | Mod: PN | Performed by: STUDENT IN AN ORGANIZED HEALTH CARE EDUCATION/TRAINING PROGRAM

## 2023-05-19 PROCEDURE — 99214 OFFICE O/P EST MOD 30 MIN: CPT | Mod: PBBFAC,PN | Performed by: STUDENT IN AN ORGANIZED HEALTH CARE EDUCATION/TRAINING PROGRAM

## 2023-05-19 PROCEDURE — 99215 PR OFFICE/OUTPT VISIT, EST, LEVL V, 40-54 MIN: ICD-10-PCS | Mod: S$PBB,,, | Performed by: STUDENT IN AN ORGANIZED HEALTH CARE EDUCATION/TRAINING PROGRAM

## 2023-05-19 NOTE — PROGRESS NOTES
Subjective:                                                                                   Patient ID:    Name: Margaret Rouse  : 1950  MRN: [id number]      HPI:   Margaret Rouse is a 72 y.o. female presents for evaluation of Invasive ductal carcinoma of left breast in female (Follow up with labs)    Patient was seen for a newly diagnosed stage I, breast lesion was seen by a screening mammogram:  23 MRI Breasts: clumped non-mass enhancement with associated clip 3.1 x 1.2 x 2.9cm with sub-cm satellites inferiorly up to 6mm at 1.6cm inferior.  No abnormal SARAH. Biopsy positive for IDC/DCIS, ER; low positive, NH: negative, no HER-2 since was insufficient invasive tumor for IDC and was run on the DCIS part.      we discussed that we might need further biopsy to find out the final ER/NH/HER-2 of her invasive part vs possible going to surgery upfront and re-assess afterwards,. Patient denies any symptoms related to her beast.      s/p left mastectomy, initially was DCIS, now found to have IDC with Her2 +, [id number], long discussion with patient and her  about the benefit of adjuvant chemo (HER-2+ therapy), agreed on proceeding      Today, reviewd blood and work answer question for clearance prior to cycle # 1, all of the questions were answered and everything was reivewed   Oncology History   Invasive ductal carcinoma of left breast in female   2023 Initial Diagnosis    Invasive ductal carcinoma of left breast in female     2023 Cancer Staged    Staging form: Breast, AJCC 8th Edition  - Clinical stage from 2023: cT1mi, cN0, cM0, G3, ER+, NH-, HER2: Not Assessed     2023 Cancer Staged    Staging form: Breast, AJCC 8th Edition  - Pathologic stage from 2023: Stage IA (pT1a, pN0(sn), cM0, G2, ER-, NH-, HER2+)     2023 -  Chemotherapy    Treatment Summary   Plan Name: OP BREAST TRASTUZUMAB PACLITAXEL WEEKLY  Treatment Goal: Curative  Status: Active  Start Date:  5/22/2023  End Date: 4/22/2024 (Planned)  Provider: Alexandra Hannah MD  Chemotherapy: PACLitaxeL (TAXOL) 80 mg/m2 = 144 mg in sodium chloride 0.9% 250 mL chemo infusion, 80 mg/m2 = 144 mg, Intravenous, Clinic/HOD 1 time, 1 of 12 cycles  Administration: 144 mg (5/22/2023)  trastuzumab-dkst (OGIVRI) 278 mg in sodium chloride 0.9% 298.2 mL chemo infusion, 4 mg/kg = 278 mg, Intravenous, Clinic/HOD 1 time, 1 of 25 cycles  Administration: 278 mg (5/22/2023)     Ductal carcinoma in situ (DCIS) of left breast   3/5/2023 Initial Diagnosis    Ductal carcinoma in situ (DCIS) of left breast     5/22/2023 -  Chemotherapy    Treatment Summary   Plan Name: OP BREAST TRASTUZUMAB PACLITAXEL WEEKLY  Treatment Goal: Curative  Status: Active  Start Date: 5/22/2023  End Date: 4/22/2024 (Planned)  Provider: Alexandra Hannah MD  Chemotherapy: PACLitaxeL (TAXOL) 80 mg/m2 = 144 mg in sodium chloride 0.9% 250 mL chemo infusion, 80 mg/m2 = 144 mg, Intravenous, Clinic/HOD 1 time, 1 of 12 cycles  Administration: 144 mg (5/22/2023)  trastuzumab-dkst (OGIVRI) 278 mg in sodium chloride 0.9% 298.2 mL chemo infusion, 4 mg/kg = 278 mg, Intravenous, Clinic/HOD 1 time, 1 of 25 cycles  Administration: 278 mg (5/22/2023)            Past Medical History:   Diagnosis Date    Abnormal results of liver function studies     Cancer     left breast cancer    Chest pain     Fatigue     H/O: pneumonia     History of chicken pox     Hyperlipidemia, mixed     Hypertension     Menopausal syndrome     Palpitations 12/15/2015    PONV (postoperative nausea and vomiting)     Thyroid disease        Past Surgical History:   Procedure Laterality Date    BREAST RECONSTRUCTION Left 4/20/2023    Procedure: RECONSTRUCTION, BREAST;  Surgeon: Pipo Bro MD;  Location: [facility];  Service: Plastics;  Laterality: Left;    CATARACT EXTRACTION W/ INTRAOCULAR LENS  IMPLANT, BILATERAL      COLONOSCOPY      COSMETIC SURGERY      DEBRIDEMENT AND CLOSURE OF WOUND OF FINGER Left 11/29/2018     Procedure: DEBRIDEMENT, WOUND, FINGER, WITH CLOSURE, VY FLap;  Surgeon: Frederic Barbour MD;  Location: [facility] OR;  Service: Plastics;  Laterality: Left;    INSERTION OF BREAST IMPLANT Left 4/20/2023    Procedure: INSERTION, BREAST IMPLANT;  Surgeon: Pipo Bro MD;  Location: [facility] OR;  Service: Plastics;  Laterality: Left;    KNEE SURGERY Right 2015    LEFT HEART CATHETERIZATION Left 11/18/2021    Procedure: CATHETERIZATION, HEART, LEFT;  Surgeon: Basim Castrejon MD;  Location: [facility] CATH;  Service: Cardiology;  Laterality: Left;    PLACEMENT OF ACELLULAR HUMAN DERMAL ALLOGRAFT Left 4/20/2023    Procedure: APPLICATION, ACELLULAR HUMAN DERMAL ALLOGRAFT;  Surgeon: Pipo Bro MD;  Location: [facility] OR;  Service: Plastics;  Laterality: Left;    SENTINEL LYMPH NODE BIOPSY Left 4/20/2023    Procedure: BIOPSY, LYMPH NODE, SENTINEL;  Surgeon: Amanda Mcclellan MD;  Location: [facility] OR;  Service: General;  Laterality: Left;    SIMPLE MASTECTOMY Left 4/20/2023    Procedure: MASTECTOMY, SIMPLE;  Surgeon: Amanda Mcclellan MD;  Location: [facility] OR;  Service: General;  Laterality: Left;    TONSILLECTOMY  1955       Family History   Problem Relation Age of Onset    No Known Problems Mother     Heart disease Father     Heart attack Father         at age 64    Cancer Sister         folicular non-hodgkins lymphoma    Lymphoma Sister     Diabetes Maternal Grandmother        Social History     Socioeconomic History    Marital status:    Tobacco Use    Smoking status: Former    Smokeless tobacco: Never    Tobacco comments:     50 years ago   Substance and Sexual Activity    Alcohol use: No     Comment: rarely    Drug use: No    Sexual activity: Yes       Review of patient's allergies indicates:  No Known Allergies    Review of Systems   Constitutional: Negative for chills, decreased appetite and weight loss.   Eyes:  Negative for visual disturbance.   Cardiovascular:  Negative for chest pain.   Respiratory:  Negative for cough  "and shortness of breath.    Hematologic/Lymphatic: Negative for adenopathy.   Skin:  Negative for rash.   Musculoskeletal:  Negative for back pain.   Gastrointestinal:  Negative for abdominal pain and diarrhea.   Genitourinary:  Negative for frequency.   Neurological:  Negative for headaches.   Psychiatric/Behavioral:  The patient is nervous/anxious.           Objective:     Vitals:    05/19/23 1147   BP: 130/78   BP Location: Right arm   Patient Position: Sitting   BP Method: Medium (Manual)   Pulse: 79   Resp: 18   Temp: 98.2 °F (36.8 °C)   TempSrc: Temporal   SpO2: 97%   Weight: 69.5 kg (153 lb 3.5 oz)   Height: 5' 6" (1.676 m)        Physical Exam  Constitutional:       Appearance: Normal appearance.   Eyes:      General: No scleral icterus.  Cardiovascular:      Rate and Rhythm: Normal rate.   Pulmonary:      Effort: No respiratory distress.      Breath sounds: Normal breath sounds.   Chest:      Comments: Left mastectomy with reconstruction   Abdominal:      General: There is no distension.   Musculoskeletal:         General: No swelling.      Cervical back: No rigidity.   Skin:     Coloration: Skin is not jaundiced.   Neurological:      General: No focal deficit present.   Psychiatric:         Behavior: Behavior normal.             Current Outpatient Medications on File Prior to Visit   Medication Sig    ARMOUR THYROID 30 mg Tab once daily.    cyanocobalamin, vitamin B-12, 3,000 mcg Cap Take by mouth once daily. Triple Blend with folate 680mcg    fluticasone propionate (FLONASE) 50 mcg/actuation nasal spray 1 spray by Each Nostril route once daily.    losartan (COZAAR) 25 MG tablet once daily.    magnesium 200 mg Tab Take 400 mg by mouth once daily.    metFORMIN (GLUCOPHAGE) 500 MG tablet Take 500 mg by mouth 2 (two) times daily.    mupirocin (BACTROBAN) 2 % ointment SMARTSIG:Both Nares    rosuvastatin (CRESTOR) 20 MG tablet Take 20 mg by mouth once daily.    traZODone (DESYREL) 50 MG tablet Take 1 tablet (50 " mg total) by mouth nightly.    VITAMIN A ORAL Take 5,000 Int'l Units/L by mouth once daily.    vitamin K2 45 mcg Cap Take by mouth once daily.    zinc gluconate 50 mg tablet Take 25 mg by mouth once daily.    ZINC ORAL Take 1 tablet by mouth once daily. Zinc 22mg with Quercetin 800mg combined    duke's soln (benadryl 30 mL, mylanta 30 mL, LIDOcaine 30 mL, nystatin 30 mL) 120mL Take 10 mLs by mouth 4 (four) times daily. (Patient not taking: Reported on 5/26/2023)    KRILL OIL ORAL Take 2,000 mg by mouth once daily.    ondansetron (ZOFRAN-ODT) 4 MG TbDL Take 1 tablet (4 mg total) by mouth every 8 (eight) hours as needed (nausea).    promethazine (PHENERGAN) 25 MG tablet Take 1 tablet (25 mg total) by mouth every 6 (six) hours as needed for Nausea.     Current Facility-Administered Medications on File Prior to Visit   Medication    0.9%  NaCl infusion       CBC:  Lab Results   Component Value Date    WBC 5.81 05/26/2023    HGB 12.8 05/26/2023    HCT 38.1 05/26/2023    MCV 93 05/26/2023     05/26/2023         CMP:  Sodium   Date Value Ref Range Status   05/26/2023 138 136 - 145 mmol/L Final     Potassium   Date Value Ref Range Status   05/26/2023 4.2 3.5 - 5.1 mmol/L Final     Chloride   Date Value Ref Range Status   05/26/2023 102 95 - 110 mmol/L Final     CO2   Date Value Ref Range Status   05/26/2023 22 (L) 23 - 29 mmol/L Final     Glucose   Date Value Ref Range Status   05/26/2023 113 (H) 70 - 110 mg/dL Final     BUN   Date Value Ref Range Status   05/26/2023 17 8 - 23 mg/dL Final     Creatinine   Date Value Ref Range Status   05/26/2023 1.0 0.5 - 1.4 mg/dL Final     Calcium   Date Value Ref Range Status   05/26/2023 10.2 8.7 - 10.5 mg/dL Final     Total Protein   Date Value Ref Range Status   05/26/2023 6.9 6.0 - 8.4 g/dL Final     Albumin   Date Value Ref Range Status   05/26/2023 4.1 3.5 - 5.2 g/dL Final     Total Bilirubin   Date Value Ref Range Status   05/26/2023 0.5 0.1 - 1.0 mg/dL Final     Comment:      For infants and newborns, interpretation of results should be based  on gestational age, weight and in agreement with clinical  observations.    Premature Infant recommended reference ranges:  Up to 24 hours.............<8.0 mg/dL  Up to 48 hours............<12.0 mg/dL  3-5 days..................<15.0 mg/dL  6-29 days.................<15.0 mg/dL       Alkaline Phosphatase   Date Value Ref Range Status   05/26/2023 40 (L) 55 - 135 U/L Final     AST ([place])   Date Value Ref Range Status   12/28/2015 41 (H) 14 - 36 U/L Final     AST   Date Value Ref Range Status   05/26/2023 19 10 - 40 U/L Final     ALT   Date Value Ref Range Status   05/26/2023 25 10 - 44 U/L Final     Anion Gap   Date Value Ref Range Status   05/26/2023 14 8 - 16 mmol/L Final     eGFR if    Date Value Ref Range Status   07/01/2022 >60 >60 mL/min/1.73 m^2 Final     eGFR if non    Date Value Ref Range Status   07/01/2022 >60 >60 mL/min/1.73 m^2 Final     Comment:     Calculation used to obtain the estimated glomerular filtration  rate (eGFR) is the CKD-EPI equation.          Echo  · The left ventricle is normal in size with normal systolic function.  · The estimated ejection fraction is 65%.  · Normal left ventricular diastolic function.  · Normal right ventricular size with normal right ventricular systolic   function.  · Normal central venous pressure (3 mmHg).  · The left ventricular global longitudinal strain is -17.9%.          ECOG SCORE    1 - Restricted in strenuous activity-ambulatory and able to carry out work of a light nature, 0 - Fully active-able to carry on all pre-disease performance without restriction          All pertinent labs and imaging reviewed. As well as outside records     Assessment:       1. Invasive ductal carcinoma of left breast in female    2. Anxiety about health    3. Insomnia, unspecified type        # Stage I IDC within DCIS  - - no indication for neoadjuvant chemo, no  indication for further imaging, no indication for oncotype especially with possible T1a or T1b IDC and age >70's   - ER: 5.4%+, ND: negative, HER-2 non assess initially then HER-2  +after IDC  was found  - significant discussion about adjuvant chemo vs endocrine but given her low ER/ND positivity, HER-2 therapy might be her only way for preventing this cancer from coming back  - decision to move forward with TH, Echo,  chemo class, navigator, no need for port,   - last DEXA scan  in 2016 l osteopenia   - proceed with cycle #1 and will need monitoring weekly     #Insomnia: if worsening might benefit from close follow up with IO     Plan:     Invasive ductal carcinoma of left breast in female  -     Cancel: trastuzumab-dkst (OGIVRI) 278 mg in sodium chloride 0.9% 250 mL chemo infusion  -     Cancel: diphenhydrAMINE (BENADRYL) 50 mg in NS 50 mL IVPB  -     Cancel: dexAMETHasone (DECADRON) 10 mg in sodium chloride 0.9% 50 mL IVPB  -     Cancel: famotidine (PF) injection 20 mg  -     Cancel: PACLitaxeL (TAXOL) 80 mg/m2 = 144 mg in sodium chloride 0.9% 250 mL chemo infusion  -     Cancel: EPINEPHrine (EPIPEN) 0.3 mg/0.3 mL pen injection 0.3 mg  -     Cancel: diphenhydrAMINE injection 50 mg  -     Cancel: hydrocortisone sodium succinate injection 100 mg  -     Cancel: sodium chloride 0.9% 250 mL flush bag  -     Cancel: sodium chloride 0.9% flush 10 mL    Anxiety about health    Insomnia, unspecified type    Other orders  -     Cancel: heparin, porcine (PF) 100 unit/mL injection flush 500 Units  -     Cancel: alteplase injection 2 mg             Patient queried and all questions were answered.    Plan was discussed with the patient and her family at length, and they verbalized understanding.        Recommend COVID guidelines being followed   Recommend  exercise, nutrition and weight management and health maintenance activities and follow-up with PCPs recommendations       Route Chart for Scheduling    Med Onc Chart  Routing      Follow up with physician Other. Schedule every Friday with MD/NP alternating and blood work CBC/CMP same day   Follow up with SHADI    Infusion scheduling note    Injection scheduling note    Labs   Schedulin day prior to infusion  Preferred lab:  Lab interval:     Imaging    Pharmacy appointment    Other referrals           Treatment Plan Information   OP BREAST TRASTUZUMAB PACLITAXEL WEEKLY   Alexandra Hannah MD   Upcoming Treatment Dates - OP BREAST TRASTUZUMAB PACLITAXEL WEEKLY    2023       Pre-Medications       diphenhydrAMINE (BENADRYL) 50 mg in NS 50 mL IVPB       dexAMETHasone (DECADRON) 10 mg in sodium chloride 0.9% 50 mL IVPB       famotidine (PF) injection 20 mg       Chemotherapy       trastuzumab-dkst (OGIVRI) 139 mg in sodium chloride 0.9% 250 mL chemo infusion       PACLitaxeL (TAXOL) 80 mg/m2 = 144 mg in sodium chloride 0.9% 250 mL chemo infusion  2023       Pre-Medications       diphenhydrAMINE (BENADRYL) 50 mg in NS 50 mL IVPB       dexAMETHasone (DECADRON) 10 mg in sodium chloride 0.9% 50 mL IVPB       famotidine (PF) injection 20 mg       Chemotherapy       trastuzumab-dkst (OGIVRI) 139 mg in sodium chloride 0.9% 250 mL chemo infusion       PACLitaxeL (TAXOL) 80 mg/m2 = 144 mg in sodium chloride 0.9% 250 mL chemo infusion  2023       Pre-Medications       diphenhydrAMINE (BENADRYL) 50 mg in NS 50 mL IVPB       dexAMETHasone (DECADRON) 10 mg in sodium chloride 0.9% 50 mL IVPB       famotidine (PF) injection 20 mg       Chemotherapy       trastuzumab-dkst (OGIVRI) 139 mg in sodium chloride 0.9% 250 mL chemo infusion       PACLitaxeL (TAXOL) 80 mg/m2 = 144 mg in sodium chloride 0.9% 250 mL chemo infusion  2023       Pre-Medications       diphenhydrAMINE (BENADRYL) 50 mg in NS 50 mL IVPB       dexAMETHasone (DECADRON) 10 mg in sodium chloride 0.9% 50 mL IVPB       famotidine (PF) injection 20 mg       Chemotherapy       trastuzumab-dkst (OGIVRI) 139 mg in sodium  chloride 0.9% 250 mL chemo infusion       PACLitaxeL (TAXOL) 80 mg/m2 = 144 mg in sodium chloride 0.9% 250 mL chemo infusion      Signed:  Alexandra Hannah MD  Hematology and Oncology  [facility]        Answers submitted by the patient for this visit:  Review of Systems Questionnaire (Submitted on 5/12/2023)  appetite change : No  unexpected weight change: No  mouth sores: No

## 2023-05-19 NOTE — PROGRESS NOTES
Pharmacy Treatment Plan Review    Patient  Margaret Rouse, 72 y.o.  1950    Indication: Breast Cancer     History:  Newly diagnosed 2/2023    Patient is s/p left mastectomy, initially was DCIS, now found to have IDC with Her2 +, [id number]    ECHO 5/11 with LVEF of 65%  Labs:  CBC  Lab Results   Component Value Date    WBC 8.10 05/19/2023    HGB 13.7 05/19/2023    HCT 41.7 05/19/2023    MCV 95 05/19/2023     05/19/2023       BMP  Lab Results   Component Value Date     05/19/2023    K 5.1 05/19/2023     05/19/2023    CO2 25 05/19/2023    BUN 19 05/19/2023    CREATININE 0.9 05/19/2023    CALCIUM 9.9 05/19/2023    ANIONGAP 9 05/19/2023    ESTGFRAFRICA >60 07/01/2022    EGFRNONAA >60 07/01/2022       LFTs  Lab Results   Component Value Date    ALT 21 05/19/2023    AST 19 05/19/2023    ALKPHOS 44 (L) 05/19/2023    BILITOT 0.6 05/19/2023       The patient is scheduled to start the following infusion:   OP BREAST TRASTUZUMAB PACLITAXEL WEEKLY    7-day cycle for 12 cycles of:    -Trastuzumab-dkst loading dose of 4 mg/kg in sodium chloride 0.9% 250 mL, infusion, intravneous, on day 1 of cycle 1 only; Subsequent doses on day 1 of cycles 2-12 to be maintenance dose of 2 mg/kg     -Paclitaxel 80 mg/m2 in sodium chloride 0.9% 250 mL, infusion, intravneous, on day 1    Premeds  -Dexamethasone 10 mg IVPB  -Diphenhydramine 50 mg IVPB  -Famotidine 20 mg IV     Supportive meds  PRN nausea meds (ondansetron and promethazine)    The treatment plan is per NCCN guidelines    El HickeyD

## 2023-05-20 ENCOUNTER — PATIENT MESSAGE (OUTPATIENT)
Dept: ADMINISTRATIVE | Facility: OTHER | Age: 73
End: 2023-05-20
Payer: MEDICARE

## 2023-05-22 ENCOUNTER — DOCUMENTATION ONLY (OUTPATIENT)
Dept: INFUSION THERAPY | Facility: HOSPITAL | Age: 73
End: 2023-05-22
Payer: MEDICARE

## 2023-05-22 ENCOUNTER — PATIENT MESSAGE (OUTPATIENT)
Dept: ADMINISTRATIVE | Facility: OTHER | Age: 73
End: 2023-05-22
Payer: MEDICARE

## 2023-05-22 ENCOUNTER — INFUSION (OUTPATIENT)
Dept: INFUSION THERAPY | Facility: HOSPITAL | Age: 73
End: 2023-05-22
Attending: STUDENT IN AN ORGANIZED HEALTH CARE EDUCATION/TRAINING PROGRAM
Payer: MEDICARE

## 2023-05-22 VITALS
RESPIRATION RATE: 18 BRPM | WEIGHT: 155.19 LBS | HEART RATE: 80 BPM | DIASTOLIC BLOOD PRESSURE: 81 MMHG | TEMPERATURE: 98 F | BODY MASS INDEX: 24.94 KG/M2 | SYSTOLIC BLOOD PRESSURE: 133 MMHG | HEIGHT: 66 IN

## 2023-05-22 DIAGNOSIS — D05.12 DUCTAL CARCINOMA IN SITU (DCIS) OF LEFT BREAST: Primary | ICD-10-CM

## 2023-05-22 DIAGNOSIS — C50.912 INVASIVE DUCTAL CARCINOMA OF LEFT BREAST IN FEMALE: ICD-10-CM

## 2023-05-22 PROCEDURE — 96367 TX/PROPH/DG ADDL SEQ IV INF: CPT | Mod: PN

## 2023-05-22 PROCEDURE — 63600175 PHARM REV CODE 636 W HCPCS: Mod: PN | Performed by: STUDENT IN AN ORGANIZED HEALTH CARE EDUCATION/TRAINING PROGRAM

## 2023-05-22 PROCEDURE — 96417 CHEMO IV INFUS EACH ADDL SEQ: CPT | Mod: PN

## 2023-05-22 PROCEDURE — A4216 STERILE WATER/SALINE, 10 ML: HCPCS | Mod: PN | Performed by: STUDENT IN AN ORGANIZED HEALTH CARE EDUCATION/TRAINING PROGRAM

## 2023-05-22 PROCEDURE — 96375 TX/PRO/DX INJ NEW DRUG ADDON: CPT | Mod: PN

## 2023-05-22 PROCEDURE — 96413 CHEMO IV INFUSION 1 HR: CPT | Mod: PN

## 2023-05-22 PROCEDURE — 25000003 PHARM REV CODE 250: Mod: PN | Performed by: STUDENT IN AN ORGANIZED HEALTH CARE EDUCATION/TRAINING PROGRAM

## 2023-05-22 RX ORDER — FAMOTIDINE 10 MG/ML
20 INJECTION INTRAVENOUS
Status: COMPLETED | OUTPATIENT
Start: 2023-05-22 | End: 2023-05-22

## 2023-05-22 RX ORDER — DIPHENHYDRAMINE HYDROCHLORIDE 50 MG/ML
50 INJECTION INTRAMUSCULAR; INTRAVENOUS ONCE AS NEEDED
Status: CANCELLED | OUTPATIENT
Start: 2023-05-22

## 2023-05-22 RX ORDER — HEPARIN 100 UNIT/ML
500 SYRINGE INTRAVENOUS
Status: CANCELLED | OUTPATIENT
Start: 2023-05-22

## 2023-05-22 RX ORDER — FAMOTIDINE 10 MG/ML
20 INJECTION INTRAVENOUS
Status: CANCELLED | OUTPATIENT
Start: 2023-05-22

## 2023-05-22 RX ORDER — EPINEPHRINE 0.3 MG/.3ML
0.3 INJECTION SUBCUTANEOUS ONCE AS NEEDED
Status: CANCELLED | OUTPATIENT
Start: 2023-05-22

## 2023-05-22 RX ORDER — DIPHENHYDRAMINE HYDROCHLORIDE 50 MG/ML
50 INJECTION INTRAMUSCULAR; INTRAVENOUS ONCE AS NEEDED
Status: DISCONTINUED | OUTPATIENT
Start: 2023-05-22 | End: 2023-05-22 | Stop reason: HOSPADM

## 2023-05-22 RX ORDER — SODIUM CHLORIDE 0.9 % (FLUSH) 0.9 %
10 SYRINGE (ML) INJECTION
Status: DISCONTINUED | OUTPATIENT
Start: 2023-05-22 | End: 2023-05-22 | Stop reason: HOSPADM

## 2023-05-22 RX ORDER — EPINEPHRINE 0.3 MG/.3ML
0.3 INJECTION SUBCUTANEOUS ONCE AS NEEDED
Status: DISCONTINUED | OUTPATIENT
Start: 2023-05-22 | End: 2023-05-22 | Stop reason: HOSPADM

## 2023-05-22 RX ORDER — SODIUM CHLORIDE 0.9 % (FLUSH) 0.9 %
10 SYRINGE (ML) INJECTION
Status: CANCELLED | OUTPATIENT
Start: 2023-05-22

## 2023-05-22 RX ADMIN — DIPHENHYDRAMINE HYDROCHLORIDE 50 MG: 50 INJECTION INTRAMUSCULAR; INTRAVENOUS at 03:05

## 2023-05-22 RX ADMIN — DEXAMETHASONE SODIUM PHOSPHATE 10 MG: 4 INJECTION INTRA-ARTICULAR; INTRALESIONAL; INTRAMUSCULAR; INTRAVENOUS; SOFT TISSUE at 03:05

## 2023-05-22 RX ADMIN — Medication 10 ML: at 11:05

## 2023-05-22 RX ADMIN — PACLITAXEL 144 MG: 6 INJECTION, SOLUTION, CONCENTRATE INTRAVENOUS at 04:05

## 2023-05-22 RX ADMIN — TRASTUZUMAB 278 MG: 150 INJECTION, POWDER, LYOPHILIZED, FOR SOLUTION INTRAVENOUS at 01:05

## 2023-05-22 RX ADMIN — FAMOTIDINE 20 MG: 10 INJECTION INTRAVENOUS at 03:05

## 2023-05-22 NOTE — PLAN OF CARE
Problem: Fatigue (Oncology Care)  Goal: Improved Activity Tolerance  Intervention: Promote Improved Energy  Flowsheets (Taken 5/22/2023 1230)  Fatigue Management:   activity schedule adjusted   fatigue-related activity identified   frequent rest breaks encouraged   paced activity encouraged  Sleep/Rest Enhancement:   relaxation techniques promoted   regular sleep/rest pattern promoted   natural light exposure provided  Activity Management:   Ambulated -L4   Ambulated to bathroom - L4   Ambulated in loaiza - L4   Ambulated in room - L4   Up in stretcher chair - L1   Up in chair - L3     Problem: Adult Inpatient Plan of Care  Goal: Patient-Specific Goal (Individualized)  Outcome: Ongoing, Progressing  Flowsheets (Taken 5/22/2023 1230)  Anxieties, Fears or Concerns: first chemo treatment/Taxol/Ogivri  Individualized Care Needs: recliner, warm blanket, pillow, dim lights, Kerry PharmD at chairside for chemo education,  at chairside, conversation/procedural education

## 2023-05-22 NOTE — PROGRESS NOTES
Nutrition Note  RD stopped by to say hello to pt on her first day of infusion. Pt spouse, Jeff, with patient. Pt states she is doing great nutritionally. RD reinforced role in POC.    Britt Laureano, MICHELLEN, LDN

## 2023-05-22 NOTE — PLAN OF CARE
Problem: Adult Inpatient Plan of Care  Goal: Plan of Care Review  5/22/2023 1756 by Thania Holbrook RN  Outcome: Ongoing, Progressing  Flowsheets (Taken 5/22/2023 1745)  Plan of Care Reviewed With:   patient   spouse   Pt tolerated her Ogivri and Taxol infusions well, NAD. Pt waited her 90 minutes post Ogivri infusion, as per protocol for her first infusion. Pt did cyrotherapy using her own gloves and socks for her Taxol treatment. Kerry HickeyD was at chairside for chemo re-education. At the completion of her treatment pt education reinforced on potential side effects, what to expect and when to call the physician. Pt given a schedule, an AVS, a new pt info sheet and a magnetic phone care and all reviewed, pt verbalized understanding. Pt ambulated out of the clinic without difficulty accompanied by her .

## 2023-05-23 ENCOUNTER — PATIENT MESSAGE (OUTPATIENT)
Dept: HEMATOLOGY/ONCOLOGY | Facility: CLINIC | Age: 73
End: 2023-05-23

## 2023-05-23 ENCOUNTER — PATIENT MESSAGE (OUTPATIENT)
Dept: ADMINISTRATIVE | Facility: OTHER | Age: 73
End: 2023-05-23
Payer: MEDICARE

## 2023-05-23 ENCOUNTER — PATIENT MESSAGE (OUTPATIENT)
Dept: HEMATOLOGY/ONCOLOGY | Facility: CLINIC | Age: 73
End: 2023-05-23
Payer: MEDICARE

## 2023-05-23 ENCOUNTER — DOCUMENTATION ONLY (OUTPATIENT)
Dept: INFUSION THERAPY | Facility: HOSPITAL | Age: 73
End: 2023-05-23
Payer: MEDICARE

## 2023-05-23 ENCOUNTER — OFFICE VISIT (OUTPATIENT)
Dept: HEMATOLOGY/ONCOLOGY | Facility: CLINIC | Age: 73
End: 2023-05-23
Payer: MEDICARE

## 2023-05-23 ENCOUNTER — LAB VISIT (OUTPATIENT)
Dept: LAB | Facility: HOSPITAL | Age: 73
End: 2023-05-23
Payer: MEDICARE

## 2023-05-23 ENCOUNTER — TELEPHONE (OUTPATIENT)
Dept: HEMATOLOGY/ONCOLOGY | Facility: CLINIC | Age: 73
End: 2023-05-23

## 2023-05-23 VITALS
DIASTOLIC BLOOD PRESSURE: 72 MMHG | OXYGEN SATURATION: 95 % | RESPIRATION RATE: 16 BRPM | HEIGHT: 66 IN | TEMPERATURE: 98 F | HEART RATE: 99 BPM | BODY MASS INDEX: 24.63 KG/M2 | SYSTOLIC BLOOD PRESSURE: 152 MMHG | WEIGHT: 153.25 LBS

## 2023-05-23 DIAGNOSIS — N39.0 URINARY TRACT INFECTION WITH HEMATURIA, SITE UNSPECIFIED: ICD-10-CM

## 2023-05-23 DIAGNOSIS — N89.8 VAGINAL IRRITATION: ICD-10-CM

## 2023-05-23 DIAGNOSIS — R31.9 URINARY TRACT INFECTION WITH HEMATURIA, SITE UNSPECIFIED: ICD-10-CM

## 2023-05-23 DIAGNOSIS — C50.912 INVASIVE DUCTAL CARCINOMA OF LEFT BREAST IN FEMALE: ICD-10-CM

## 2023-05-23 DIAGNOSIS — B37.31 VAGINAL YEAST INFECTION: Primary | ICD-10-CM

## 2023-05-23 DIAGNOSIS — D05.12 DUCTAL CARCINOMA IN SITU (DCIS) OF LEFT BREAST: Primary | ICD-10-CM

## 2023-05-23 DIAGNOSIS — R31.9 HEMATURIA, UNSPECIFIED TYPE: ICD-10-CM

## 2023-05-23 DIAGNOSIS — D05.12 DUCTAL CARCINOMA IN SITU (DCIS) OF LEFT BREAST: ICD-10-CM

## 2023-05-23 LAB
BACTERIA #/AREA URNS HPF: ABNORMAL /HPF
BILIRUB UR QL STRIP: NEGATIVE
CLARITY UR: CLEAR
COLOR UR: YELLOW
GLUCOSE UR QL STRIP: NEGATIVE
HGB UR QL STRIP: ABNORMAL
KETONES UR QL STRIP: NEGATIVE
LEUKOCYTE ESTERASE UR QL STRIP: NEGATIVE
MICROSCOPIC COMMENT: ABNORMAL
NITRITE UR QL STRIP: NEGATIVE
PH UR STRIP: 6 [PH] (ref 5–8)
PROT UR QL STRIP: NEGATIVE
RBC #/AREA URNS HPF: 5 /HPF (ref 0–4)
SP GR UR STRIP: >=1.03 (ref 1–1.03)
SQUAMOUS #/AREA URNS HPF: 100 /HPF
URN SPEC COLLECT METH UR: ABNORMAL
WBC #/AREA URNS HPF: 8 /HPF (ref 0–5)

## 2023-05-23 PROCEDURE — 99999 PR PBB SHADOW E&M-EST. PATIENT-LVL IV: CPT | Mod: PBBFAC,,,

## 2023-05-23 PROCEDURE — 99999 PR PBB SHADOW E&M-EST. PATIENT-LVL IV: ICD-10-PCS | Mod: PBBFAC,,,

## 2023-05-23 PROCEDURE — 99214 OFFICE O/P EST MOD 30 MIN: CPT | Mod: S$PBB,,,

## 2023-05-23 PROCEDURE — 99214 OFFICE O/P EST MOD 30 MIN: CPT | Mod: PBBFAC,PN

## 2023-05-23 PROCEDURE — 99214 PR OFFICE/OUTPT VISIT, EST, LEVL IV, 30-39 MIN: ICD-10-PCS | Mod: S$PBB,,,

## 2023-05-23 PROCEDURE — 81000 URINALYSIS NONAUTO W/SCOPE: CPT | Mod: PN

## 2023-05-23 RX ORDER — SULFAMETHOXAZOLE AND TRIMETHOPRIM 800; 160 MG/1; MG/1
1 TABLET ORAL 2 TIMES DAILY
Qty: 14 TABLET | Refills: 0 | Status: SHIPPED | OUTPATIENT
Start: 2023-05-23 | End: 2023-05-30

## 2023-05-23 RX ORDER — FLUCONAZOLE 150 MG/1
TABLET ORAL
Qty: 2 TABLET | Refills: 0 | Status: SHIPPED | OUTPATIENT
Start: 2023-05-23 | End: 2023-07-07 | Stop reason: ALTCHOICE

## 2023-05-23 RX ORDER — FLUCONAZOLE 150 MG/1
150 TABLET ORAL DAILY
Qty: 1 TABLET | Refills: 0 | Status: SHIPPED | OUTPATIENT
Start: 2023-05-23 | End: 2023-05-23 | Stop reason: DRUGHIGH

## 2023-05-23 NOTE — PROGRESS NOTES
Urgent Care Oncology Visit    Date and Time: 05/23/2023 10:07 AM   Patient MRN: [id number]   Chief Complaint:   Chief Complaint   Patient presents with    Invasive ductal carcinoma of left breast in female     Urinary symptoms     Reason for Urgent Care Visit:  vaginal itching/blood when wiping   Intervention: education provided, labs ordered, and prescriptions sent  Dispo: return to clinic as previous  UrgOnc appointment addressed via: [facility] message  This UrgOnc visit was in person  Time spent handling UC issue:  30    Was this virtual or in person UrgOnc visit appropriate? Yes     PATIENT: Margaret Rouse  MRN: [id number]  DATE: 5/23/2023    Diagnosis:   1. Vaginal yeast infection    2. Vaginal irritation      Chief Complaint: Invasive ductal carcinoma of left breast in female (Urinary symptoms)    Subjective:    Interval History: Ms. Rouse is a 72 y.o. female with invasive ductal carcinoma of the left breast who is here today for a same day appointment to discuss recent onset of symptoms to include vaginal itching.    She began treatment with Paclitaxel and Trastuzumab on 5/22/23, yesterday.     Of note, patient used hormone pellets until approximately January of this year.   Patient tells me her PCP had treated her for a vaginal yeast infection several weeks ago due to itching.   Last week, the patient noted to have some intermittent vaginal itching. She used an OTC suppository with some relief. She attributes the symptoms to her soaps and loofah.   Had intercourse over the weekend and noted the symptoms returned after.   Yesterday, after completing her first chemotherapy treatment she noted a small amount of blood present when wiping after using the restroom. This did not appear to be in her urine.   Mild pelvic discomfort yesterday evening.   Denies fevers, chills, N/V, abd pain, dysuria, hematuria, flank pain.     Oncology History:   2/23/23 Seen for a newly diagnosed stage IA IDC of the left breast.  MRI Breasts: clumped non-mass enhancement with associated clip 3.1 x 1.2 x 2.9cm with sub-cm satellites inferiorly up to 6mm at 1.6cm inferior.  No abnormal SARAH. Biopsy positive for IDC/DCIS, ER; low positive, OK: negative, no HER-2 since was insufficient invasive tumor for IDC and was run on the DCIS part.    5/11/23 ECHO with EF 65%  Oncology History   Invasive ductal carcinoma of left breast in female   2/27/2023 Initial Diagnosis    Invasive ductal carcinoma of left breast in female       2/27/2023 Cancer Staged    Staging form: Breast, AJCC 8th Edition  - Clinical stage from 2/27/2023: cT1mi, cN0, cM0, G3, ER+, OK-, HER2: Not Assessed       5/1/2023 Cancer Staged    Staging form: Breast, AJCC 8th Edition  - Pathologic stage from 5/1/2023: Stage IA (pT1a, pN0(sn), cM0, G2, ER-, OK-, HER2+)       5/22/2023 -  Chemotherapy    Treatment Summary   Plan Name: OP BREAST TRASTUZUMAB PACLITAXEL WEEKLY  Treatment Goal: Curative  Status: Active  Start Date: 5/22/2023  End Date: 4/22/2024 (Planned)  Provider: Alexandra Hannah MD  Chemotherapy: PACLitaxeL (TAXOL) 80 mg/m2 = 144 mg in sodium chloride 0.9% 250 mL chemo infusion, 80 mg/m2 = 144 mg, Intravenous, Clinic/HOD 1 time, 1 of 12 cycles  Administration: 144 mg (5/22/2023)  trastuzumab-dkst (OGIVRI) 278 mg in sodium chloride 0.9% 298.2 mL chemo infusion, 4 mg/kg = 278 mg, Intravenous, Clinic/HOD 1 time, 1 of 25 cycles  Administration: 278 mg (5/22/2023)       Ductal carcinoma in situ (DCIS) of left breast   3/5/2023 Initial Diagnosis    Ductal carcinoma in situ (DCIS) of left breast       5/22/2023 -  Chemotherapy    Treatment Summary   Plan Name: OP BREAST TRASTUZUMAB PACLITAXEL WEEKLY  Treatment Goal: Curative  Status: Active  Start Date: 5/22/2023  End Date: 4/22/2024 (Planned)  Provider: Alexandra Hannah MD  Chemotherapy: PACLitaxeL (TAXOL) 80 mg/m2 = 144 mg in sodium chloride 0.9% 250 mL chemo infusion, 80 mg/m2 = 144 mg, Intravenous, Clinic/HOD 1 time, 1 of 12  cycles  Administration: 144 mg (5/22/2023)  trastuzumab-dkst (OGIVRI) 278 mg in sodium chloride 0.9% 298.2 mL chemo infusion, 4 mg/kg = 278 mg, Intravenous, Clinic/HOD 1 time, 1 of 25 cycles  Administration: 278 mg (5/22/2023)          Past Medical History:   Past Medical History:   Diagnosis Date    Abnormal results of liver function studies     Cancer     left breast cancer    Chest pain     Fatigue     H/O: pneumonia     History of chicken pox     Hyperlipidemia, mixed     Hypertension     Menopausal syndrome     Palpitations 12/15/2015    PONV (postoperative nausea and vomiting)     Thyroid disease        Past Surgical HIstory:   Past Surgical History:   Procedure Laterality Date    BREAST RECONSTRUCTION Left 4/20/2023    Procedure: RECONSTRUCTION, BREAST;  Surgeon: Pipo Bro MD;  [facility]: [email or web address] OR;  Service: Plastics;  Laterality: Left;    CATARACT EXTRACTION W/ INTRAOCULAR LENS  IMPLANT, BILATERAL      COLONOSCOPY      COSMETIC SURGERY      DEBRIDEMENT AND CLOSURE OF WOUND OF FINGER Left 11/29/2018    Procedure: DEBRIDEMENT, WOUND, FINGER, WITH CLOSURE, VY FLap;  Surgeon: Frederic Barbour MD;  Location: [email or web address] OR;  Service: Plastics;  Laterality: Left;    INSERTION OF BREAST IMPLANT Left 4/20/2023    Procedure: INSERTION, BREAST IMPLANT;  Surgeon: Pipo Bro MD;  Location: [email or web address] OR;  Service: Plastics;  Laterality: Left;    KNEE SURGERY Right 2015    LEFT HEART CATHETERIZATION Left 11/18/2021    Procedure: CATHETERIZATION, HEART, LEFT;  Surgeon: Basim Castrejon MD;  Location: [email or web address] CATH;  Service: Cardiology;  Laterality: Left;    PLACEMENT OF ACELLULAR HUMAN DERMAL ALLOGRAFT Left 4/20/2023    Procedure: APPLICATION, ACELLULAR HUMAN DERMAL ALLOGRAFT;  Surgeon: Pipo Bro MD;  Location: [email or web address] OR;  Service: Plastics;  Laterality: Left;    SENTINEL LYMPH NODE BIOPSY Left 4/20/2023    Procedure: BIOPSY, LYMPH NODE, SENTINEL;  Surgeon: Amanda Mcclellan MD;  Location: [facility] [[email or web address] OR;  Service: General;  Laterality:  Left;    SIMPLE MASTECTOMY Left 4/20/2023    Procedure: MASTECTOMY, SIMPLE;  Surgeon: Amanda Mcclellan MD;  Location: [facility] OR;  Service: General;  Laterality: Left;    TONSILLECTOMY  1955       Family History:   Family History   Problem Relation Age of Onset    No Known Problems Mother     Heart disease Father     Heart attack Father         at age 64    Cancer Sister         folicular non-hodgkins lymphoma    Lymphoma Sister     Diabetes Maternal Grandmother        Social History:  reports that she has quit smoking. She has never used smokeless tobacco. She reports that she does not drink alcohol and does not use drugs.    Allergies:  Review of patient's allergies indicates:  No Known Allergies    Medications:  Current Outpatient Medications   Medication Sig Dispense Refill    ARMOUR THYROID 30 mg Tab once daily.      cyanocobalamin, vitamin B-12, 3,000 mcg Cap Take by mouth once daily. Triple Blend with folate 680mcg      duke's soln (benadryl 30 mL, mylanta 30 mL, LIDOcaine 30 mL, nystatin 30 mL) 120mL Take 10 mLs by mouth 4 (four) times daily. 120 mL 0    fluticasone propionate (FLONASE) 50 mcg/actuation nasal spray 1 spray by Each Nostril route once daily.      KRILL OIL ORAL Take 2,000 mg by mouth once daily.      losartan (COZAAR) 25 MG tablet once daily.      metFORMIN (GLUCOPHAGE) 500 MG tablet Take 500 mg by mouth 2 (two) times daily.      mupirocin (BACTROBAN) 2 % ointment SMARTSIG:Both Nares      ondansetron (ZOFRAN-ODT) 4 MG TbDL Take 1 tablet (4 mg total) by mouth every 8 (eight) hours as needed (nausea). 12 tablet 0    promethazine (PHENERGAN) 25 MG tablet Take 1 tablet (25 mg total) by mouth every 6 (six) hours as needed for Nausea. 30 tablet 0    rosuvastatin (CRESTOR) 20 MG tablet Take 20 mg by mouth once daily.      traZODone (DESYREL) 50 MG tablet Take 1 tablet (50 mg total) by mouth nightly. 30 tablet 2    fluconazole (DIFLUCAN) 150 MG Tab Take 1 tablet (150 mg total) by mouth once daily.  "for 1 day 1 tablet 0    magnesium 200 mg Tab Take 400 mg by mouth once daily.      VITAMIN A ORAL Take 5,000 Int'l Units/L by mouth once daily.      vitamin K2 45 mcg Cap Take by mouth once daily.      zinc gluconate 50 mg tablet Take 25 mg by mouth once daily.      ZINC ORAL Take 1 tablet by mouth once daily. Zinc 22mg with Quercetin 800mg combined       No current facility-administered medications for this visit.     Facility-Administered Medications Ordered in Other Visits   Medication Dose Route Frequency Provider Last Rate Last Admin    0.9%  NaCl infusion   Intravenous Continuous Marissa Lei NP           Review of Systems   Constitutional:  Negative for appetite change and unexpected weight change.   HENT:  Negative for mouth sores.    Eyes:  Negative for visual disturbance.   Respiratory:  Negative for cough and shortness of breath.    Cardiovascular:  Negative for chest pain.   Gastrointestinal:  Negative for abdominal pain and diarrhea.   Genitourinary:  Positive for vaginal bleeding. Negative for difficulty urinating, dysuria, flank pain, frequency, hematuria and vaginal discharge.   Musculoskeletal:  Negative for back pain.   Skin:  Negative for rash.   Neurological:  Negative for headaches.   Hematological:  Negative for adenopathy.   Psychiatric/Behavioral:  The patient is nervous/anxious.      ECOG Performance Status:   ECOG SCORE    0 - Fully active-able to carry on all pre-disease performance without restriction         Objective:      Vitals:   Vitals:    05/23/23 1038   BP: (!) 152/72   BP Location: Right arm   Patient Position: Sitting   BP Method: Medium (Manual)   Pulse: 99   Resp: 16   Temp: 98.1 °F (36.7 °C)   TempSrc: Temporal   SpO2: 95%   Weight: 69.5 kg (153 lb 3.5 oz)   Height: 5' 6" (1.676 m)       BMI: Body mass index is 24.73 kg/m².    Physical Exam  Vitals reviewed.   Constitutional:       General: She is not in acute distress.     Appearance: She is not diaphoretic.   HENT:    "   Head: Normocephalic and atraumatic.   Eyes:      General: No scleral icterus.  Cardiovascular:      Rate and Rhythm: Normal rate and regular rhythm.   Pulmonary:      Effort: Pulmonary effort is normal. No respiratory distress.   Abdominal:      General: Abdomen is flat. Bowel sounds are normal. There is no distension.      Palpations: Abdomen is soft.      Tenderness: There is no abdominal tenderness. There is no right CVA tenderness, left CVA tenderness or guarding.   Musculoskeletal:      Right lower leg: No edema.      Left lower leg: No edema.   Skin:     Coloration: Skin is not jaundiced.      Findings: No rash.   Neurological:      Mental Status: She is alert and oriented to person, place, and time.   Psychiatric:         Mood and Affect: Mood normal.         Behavior: Behavior normal.       Laboratory Data:  Lab Results   Component Value Date    WBC 8.10 05/19/2023    HGB 13.7 05/19/2023    HCT 41.7 05/19/2023    MCV 95 05/19/2023     05/19/2023        Assessment:       1. Vaginal yeast infection    2. Vaginal irritation         Plan:   Invasive ductal carcinoma of the left breast  -no indication for neoadjuvant chemo, no indication for further imaging, no indication for oncotype especially with possible T1a or T1b IDC and age >70's   -ER: 5.4%+, NM: negative, HER-2 non assess initially then HER-2  +after IDC  was found  -significant discussion about adjuvant chemo vs endocrine but given her low ER/NM positivity, HER-2 therapy might be her only way for preventing this cancer from coming back  - last DEXA scan  in 2016 l osteopenia   -decision to move forward with ; began 5/22/23  -Keep appointments as planned       Vaginal itching/dryness  -Noted small amount of blood when using restroom yesterday; will get UA and urine culture today. Will cover with antibiotics if needed.   -Due to recent vaginal yeast infection, Will send Rx for Diflucan today; repeat dose in 3-4 days   -Patient to use vaginal  gels such as RepHresh PRN   -Discussed this could be due to stopping hormone supplements recently, vaginal atrophy, lingering yeast infection  -Keep appointments as planned, encouraged to call if needed.     **Patient's urine came back with bacteria and 3+ blood; Rx for Bactrim DS sent to pharmacy.     Assessment/Plan reviewed and approved by Dr. Hannah.    30 minutes were spent in coordination of patient's care, record review and counseling.      Route Chart for Scheduling    Med Onc Chart Routing      Follow up with physician Other. keep appointments as planned   Follow up with [facility]    Infusion scheduling note    Injection scheduling note    Labs Other   Scheduling:  Preferred lab:  Lab interval:  Today UA and culure -already ordered   Imaging    Pharmacy appointment    Other referrals           Treatment Plan Information   OP BREAST TRASTUZUMAB PACLITAXEL WEEKLY   Alexandra Hannah MD   Upcoming Treatment Dates - OP BREAST TRASTUZUMAB PACLITAXEL WEEKLY    5/29/2023       Pre-Medications       diphenhydrAMINE (BENADRYL) 50 mg in NS 50 mL IVPB       dexAMETHasone (DECADRON) 10 mg in sodium chloride 0.9% 50 mL IVPB       famotidine (PF) injection 20 mg       Chemotherapy       trastuzumab-dkst (OGIVRI) 139 mg in sodium chloride 0.9% 250 mL chemo infusion       PACLitaxeL (TAXOL) 80 mg/m2 = 144 mg in sodium chloride 0.9% 250 mL chemo infusion  6/5/2023       Pre-Medications       diphenhydrAMINE (BENADRYL) 50 mg in NS 50 mL IVPB       dexAMETHasone (DECADRON) 10 mg in sodium chloride 0.9% 50 mL IVPB       famotidine (PF) injection 20 mg       Chemotherapy       trastuzumab-dkst (OGIVRI) 139 mg in sodium chloride 0.9% 250 mL chemo infusion       PACLitaxeL (TAXOL) 80 mg/m2 = 144 mg in sodium chloride 0.9% 250 mL chemo infusion  6/12/2023       Pre-Medications       diphenhydrAMINE (BENADRYL) 50 mg in NS 50 mL IVPB       dexAMETHasone (DECADRON) 10 mg in sodium chloride 0.9% 50 mL IVPB       famotidine (PF) injection 20 mg        Chemotherapy       trastuzumab-dkst (OGIVRI) 139 mg in sodium chloride 0.9% 250 mL chemo infusion       PACLitaxeL (TAXOL) 80 mg/m2 = 144 mg in sodium chloride 0.9% 250 mL chemo infusion  6/19/2023       Pre-Medications       diphenhydrAMINE (BENADRYL) 50 mg in NS 50 mL IVPB       dexAMETHasone (DECADRON) 10 mg in sodium chloride 0.9% 50 mL IVPB       famotidine (PF) injection 20 mg       Chemotherapy       trastuzumab-dkst (OGIVRI) 139 mg in sodium chloride 0.9% 250 mL chemo infusion       PACLitaxeL (TAXOL) 80 mg/m2 = 144 mg in sodium chloride 0.9% 250 mL chemo infusion

## 2023-05-23 NOTE — PROGRESS NOTES
SW met with pt today to  her wig that was ordered from [employer] catalog. Pt was happy with the wig. Her  was here and was supportive of the pt.     Mary Lee, CELESTINAW

## 2023-05-24 ENCOUNTER — PATIENT MESSAGE (OUTPATIENT)
Dept: ADMINISTRATIVE | Facility: OTHER | Age: 73
End: 2023-05-24
Payer: MEDICARE

## 2023-05-25 ENCOUNTER — PATIENT MESSAGE (OUTPATIENT)
Dept: ADMINISTRATIVE | Facility: OTHER | Age: 73
End: 2023-05-25
Payer: MEDICARE

## 2023-05-26 ENCOUNTER — OFFICE VISIT (OUTPATIENT)
Dept: HEMATOLOGY/ONCOLOGY | Facility: CLINIC | Age: 73
End: 2023-05-26
Payer: MEDICARE

## 2023-05-26 ENCOUNTER — LAB VISIT (OUTPATIENT)
Dept: LAB | Facility: HOSPITAL | Age: 73
End: 2023-05-26
Attending: STUDENT IN AN ORGANIZED HEALTH CARE EDUCATION/TRAINING PROGRAM
Payer: MEDICARE

## 2023-05-26 ENCOUNTER — PATIENT MESSAGE (OUTPATIENT)
Dept: ADMINISTRATIVE | Facility: OTHER | Age: 73
End: 2023-05-26
Payer: MEDICARE

## 2023-05-26 ENCOUNTER — TELEPHONE (OUTPATIENT)
Dept: HEMATOLOGY/ONCOLOGY | Facility: CLINIC | Age: 73
End: 2023-05-26
Payer: MEDICARE

## 2023-05-26 VITALS
DIASTOLIC BLOOD PRESSURE: 76 MMHG | TEMPERATURE: 98 F | HEART RATE: 74 BPM | SYSTOLIC BLOOD PRESSURE: 124 MMHG | RESPIRATION RATE: 16 BRPM | OXYGEN SATURATION: 95 % | HEIGHT: 66 IN | BODY MASS INDEX: 24.59 KG/M2 | WEIGHT: 153 LBS

## 2023-05-26 DIAGNOSIS — C50.912 INVASIVE DUCTAL CARCINOMA OF LEFT BREAST IN FEMALE: Primary | ICD-10-CM

## 2023-05-26 DIAGNOSIS — N39.0 URINARY TRACT INFECTION WITH HEMATURIA, SITE UNSPECIFIED: ICD-10-CM

## 2023-05-26 DIAGNOSIS — R31.9 URINARY TRACT INFECTION WITH HEMATURIA, SITE UNSPECIFIED: ICD-10-CM

## 2023-05-26 DIAGNOSIS — N89.8 VAGINAL IRRITATION: ICD-10-CM

## 2023-05-26 DIAGNOSIS — C50.912 INVASIVE DUCTAL CARCINOMA OF LEFT BREAST IN FEMALE: ICD-10-CM

## 2023-05-26 LAB
ALBUMIN SERPL BCP-MCNC: 4.1 G/DL (ref 3.5–5.2)
ALP SERPL-CCNC: 40 U/L (ref 55–135)
ALT SERPL W/O P-5'-P-CCNC: 25 U/L (ref 10–44)
ANION GAP SERPL CALC-SCNC: 14 MMOL/L (ref 8–16)
AST SERPL-CCNC: 19 U/L (ref 10–40)
BASOPHILS # BLD AUTO: 0.03 K/UL (ref 0–0.2)
BASOPHILS NFR BLD: 0.5 % (ref 0–1.9)
BILIRUB SERPL-MCNC: 0.5 MG/DL (ref 0.1–1)
BUN SERPL-MCNC: 17 MG/DL (ref 8–23)
CALCIUM SERPL-MCNC: 10.2 MG/DL (ref 8.7–10.5)
CHLORIDE SERPL-SCNC: 102 MMOL/L (ref 95–110)
CO2 SERPL-SCNC: 22 MMOL/L (ref 23–29)
CREAT SERPL-MCNC: 1 MG/DL (ref 0.5–1.4)
DIFFERENTIAL METHOD: ABNORMAL
EOSINOPHIL # BLD AUTO: 0.2 K/UL (ref 0–0.5)
EOSINOPHIL NFR BLD: 3.6 % (ref 0–8)
ERYTHROCYTE [DISTWIDTH] IN BLOOD BY AUTOMATED COUNT: 11.9 % (ref 11.5–14.5)
EST. GFR  (NO RACE VARIABLE): 59.9 ML/MIN/1.73 M^2
GLUCOSE SERPL-MCNC: 113 MG/DL (ref 70–110)
HCT VFR BLD AUTO: 38.1 % (ref 37–48.5)
HGB BLD-MCNC: 12.8 G/DL (ref 12–16)
IMM GRANULOCYTES # BLD AUTO: 0.01 K/UL (ref 0–0.04)
IMM GRANULOCYTES NFR BLD AUTO: 0.2 % (ref 0–0.5)
LYMPHOCYTES # BLD AUTO: 2 K/UL (ref 1–4.8)
LYMPHOCYTES NFR BLD: 33.7 % (ref 18–48)
MCH RBC QN AUTO: 31.2 PG (ref 27–31)
MCHC RBC AUTO-ENTMCNC: 33.6 G/DL (ref 32–36)
MCV RBC AUTO: 93 FL (ref 82–98)
MONOCYTES # BLD AUTO: 0.2 K/UL (ref 0.3–1)
MONOCYTES NFR BLD: 3.3 % (ref 4–15)
NEUTROPHILS # BLD AUTO: 3.4 K/UL (ref 1.8–7.7)
NEUTROPHILS NFR BLD: 58.7 % (ref 38–73)
NRBC BLD-RTO: 0 /100 WBC
PLATELET # BLD AUTO: 182 K/UL (ref 150–450)
PMV BLD AUTO: 9.8 FL (ref 9.2–12.9)
POTASSIUM SERPL-SCNC: 4.2 MMOL/L (ref 3.5–5.1)
PROT SERPL-MCNC: 6.9 G/DL (ref 6–8.4)
RBC # BLD AUTO: 4.1 M/UL (ref 4–5.4)
SODIUM SERPL-SCNC: 138 MMOL/L (ref 136–145)
WBC # BLD AUTO: 5.81 K/UL (ref 3.9–12.7)

## 2023-05-26 PROCEDURE — 85025 COMPLETE CBC W/AUTO DIFF WBC: CPT | Mod: PN | Performed by: STUDENT IN AN ORGANIZED HEALTH CARE EDUCATION/TRAINING PROGRAM

## 2023-05-26 PROCEDURE — 99999 PR PBB SHADOW E&M-EST. PATIENT-LVL IV: CPT | Mod: PBBFAC,,,

## 2023-05-26 PROCEDURE — 99214 OFFICE O/P EST MOD 30 MIN: CPT | Mod: PBBFAC,PN

## 2023-05-26 PROCEDURE — 99999 PR PBB SHADOW E&M-EST. PATIENT-LVL IV: ICD-10-PCS | Mod: PBBFAC,,,

## 2023-05-26 PROCEDURE — 99214 PR OFFICE/OUTPT VISIT, EST, LEVL IV, 30-39 MIN: ICD-10-PCS | Mod: S$PBB,,,

## 2023-05-26 PROCEDURE — 80053 COMPREHEN METABOLIC PANEL: CPT | Mod: PN | Performed by: STUDENT IN AN ORGANIZED HEALTH CARE EDUCATION/TRAINING PROGRAM

## 2023-05-26 PROCEDURE — 99214 OFFICE O/P EST MOD 30 MIN: CPT | Mod: S$PBB,,,

## 2023-05-26 PROCEDURE — 36415 COLL VENOUS BLD VENIPUNCTURE: CPT | Mod: PN | Performed by: STUDENT IN AN ORGANIZED HEALTH CARE EDUCATION/TRAINING PROGRAM

## 2023-05-26 RX ORDER — HEPARIN 100 UNIT/ML
500 SYRINGE INTRAVENOUS
Status: CANCELLED | OUTPATIENT
Start: 2023-05-29

## 2023-05-26 RX ORDER — EPINEPHRINE 0.3 MG/.3ML
0.3 INJECTION SUBCUTANEOUS ONCE AS NEEDED
Status: CANCELLED | OUTPATIENT
Start: 2023-05-29

## 2023-05-26 RX ORDER — DIPHENHYDRAMINE HYDROCHLORIDE 50 MG/ML
50 INJECTION INTRAMUSCULAR; INTRAVENOUS ONCE AS NEEDED
Status: CANCELLED | OUTPATIENT
Start: 2023-05-29

## 2023-05-26 RX ORDER — FAMOTIDINE 10 MG/ML
20 INJECTION INTRAVENOUS
Status: CANCELLED | OUTPATIENT
Start: 2023-05-29

## 2023-05-26 RX ORDER — SODIUM CHLORIDE 0.9 % (FLUSH) 0.9 %
10 SYRINGE (ML) INJECTION
Status: CANCELLED | OUTPATIENT
Start: 2023-05-29

## 2023-05-26 NOTE — PROGRESS NOTES
PATIENT: Margaret Rouse  MRN: [id number]  DATE: 5/26/2023    Diagnosis:   1. Invasive ductal carcinoma of left breast in female    2. Urinary tract infection with hematuria, site unspecified    3. Vaginal irritation        Chief Complaint: Invasive ductal carcinoma of left breast in female (Follow up with labs )    Subjective:   HPI: Ms. Rouse is a 72 y.o. female with invasive ductal carcinoma of the left breast who is here today for follow up and consideration of C2D1 Taxol/Ogivri on 5/29/23.    She began treatment with Paclitaxel and Trastuzumab on 5/22/23. Was seen earlier this week for UTI, and vaginal irritation. Was given prescriptions for Fluconazole and Bactrim DS. Her UTI symptoms have improved. Still concerned about blood/vaginal discharge that was present last week. We previously discussed this could be related to stopping her hormone pellets.      Began having some myalgias, joint pain yesterday.   Denies HA, CP, SOB, cough, abd pain, diarrhea, constipation, dysuria, swelling, new lumps or bumps, rashes. Dneies fevers, chills.     Oncology History:   2/23/23 Seen for a newly diagnosed stage IA IDC of the left breast. MRI Breasts: clumped non-mass enhancement with associated clip 3.1 x 1.2 x 2.9cm with sub-cm satellites inferiorly up to 6mm at 1.6cm inferior.  No abnormal SARAH. Biopsy positive for IDC/DCIS, ER; low positive, SD: negative, no HER-2 since was insufficient invasive tumor for IDC and was run on the DCIS part.    5/11/23 ECHO with EF 65%  Oncology History   Invasive ductal carcinoma of left breast in female   2/27/2023 Initial Diagnosis    Invasive ductal carcinoma of left breast in female       2/27/2023 Cancer Staged    Staging form: Breast, AJCC 8th Edition  - Clinical stage from 2/27/2023: cT1mi, cN0, cM0, G3, ER+, SD-, HER2: Not Assessed       5/1/2023 Cancer Staged    Staging form: Breast, AJCC 8th Edition  - Pathologic stage from 5/1/2023: Stage IA (pT1a, pN0(sn), cM0, G2, ER-,  [place]-, HER2+)       5/22/2023 -  Chemotherapy    Treatment Summary   Plan Name: OP BREAST TRASTUZUMAB PACLITAXEL WEEKLY  Treatment Goal: Curative  Status: Active  Start Date: 5/22/2023  End Date: 4/22/2024 (Planned)  Provider: Alexandra Hannah MD  Chemotherapy: PACLitaxeL (TAXOL) 80 mg/m2 = 144 mg in sodium chloride 0.9% 250 mL chemo infusion, 80 mg/m2 = 144 mg, Intravenous, Clinic/HOD 1 time, 1 of 12 cycles  Administration: 144 mg (5/22/2023)  trastuzumab-dkst (OGIVRI) 278 mg in sodium chloride 0.9% 298.2 mL chemo infusion, 4 mg/kg = 278 mg, Intravenous, Clinic/HOD 1 time, 1 of 25 cycles  Administration: 278 mg (5/22/2023)       Ductal carcinoma in situ (DCIS) of left breast   3/5/2023 Initial Diagnosis    Ductal carcinoma in situ (DCIS) of left breast       5/22/2023 -  Chemotherapy    Treatment Summary   Plan Name: OP BREAST TRASTUZUMAB PACLITAXEL WEEKLY  Treatment Goal: Curative  Status: Active  Start Date: 5/22/2023  End Date: 4/22/2024 (Planned)  Provider: Alexandra Hannah MD  Chemotherapy: PACLitaxeL (TAXOL) 80 mg/m2 = 144 mg in sodium chloride 0.9% 250 mL chemo infusion, 80 mg/m2 = 144 mg, Intravenous, Clinic/HOD 1 time, 1 of 12 cycles  Administration: 144 mg (5/22/2023)  trastuzumab-dkst (OGIVRI) 278 mg in sodium chloride 0.9% 298.2 mL chemo infusion, 4 mg/kg = 278 mg, Intravenous, Clinic/HOD 1 time, 1 of 25 cycles  Administration: 278 mg (5/22/2023)          Past Medical History:   Past Medical History:   Diagnosis Date    Abnormal results of liver function studies     Cancer     left breast cancer    Chest pain     Fatigue     H/O: pneumonia     History of chicken pox     Hyperlipidemia, mixed     Hypertension     Menopausal syndrome     Palpitations 12/15/2015    PONV (postoperative nausea and vomiting)     Thyroid disease        Past Surgical HIstory:   Past Surgical History:   Procedure Laterality Date    BREAST RECONSTRUCTION Left 4/20/2023    Procedure: RECONSTRUCTION, BREAST;  Surgeon: Pipo Bro MD;   Location: [facility] OR;  Service: Plastics;  Laterality: Left;    CATARACT EXTRACTION W/ INTRAOCULAR LENS  IMPLANT, BILATERAL      COLONOSCOPY      COSMETIC SURGERY      DEBRIDEMENT AND CLOSURE OF WOUND OF FINGER Left 11/29/2018    Procedure: DEBRIDEMENT, WOUND, FINGER, WITH CLOSURE, VY FLap;  Surgeon: Frederic Barbour MD;  Location: [facility] OR;  Service: Plastics;  Laterality: Left;    INSERTION OF BREAST IMPLANT Left 4/20/2023    Procedure: INSERTION, BREAST IMPLANT;  Surgeon: Pipo Bro MD;  Location: [facility] OR;  Service: Plastics;  Laterality: Left;    KNEE SURGERY Right 2015    LEFT HEART CATHETERIZATION Left 11/18/2021    Procedure: CATHETERIZATION, HEART, LEFT;  Surgeon: Basim Castrejon MD;  Location: [facility] CATH;  Service: Cardiology;  Laterality: Left;    PLACEMENT OF ACELLULAR HUMAN DERMAL ALLOGRAFT Left 4/20/2023    Procedure: APPLICATION, ACELLULAR HUMAN DERMAL ALLOGRAFT;  Surgeon: Pipo Bro MD;  Location: [facility] OR;  Service: Plastics;  Laterality: Left;    SENTINEL LYMPH NODE BIOPSY Left 4/20/2023    Procedure: BIOPSY, LYMPH NODE, SENTINEL;  Surgeon: Amanda Mcclellan MD;  Location: [facility] OR;  Service: General;  Laterality: Left;    SIMPLE MASTECTOMY Left 4/20/2023    Procedure: MASTECTOMY, SIMPLE;  Surgeon: Amanda Mcclellan MD;  Location: [facility] OR;  Service: General;  Laterality: Left;    TONSILLECTOMY  1955       Family History:   Family History   Problem Relation Age of Onset    No Known Problems Mother     Heart disease Father     Heart attack Father         at age 64    Cancer Sister         folicular non-hodgkins lymphoma    Lymphoma Sister     Diabetes Maternal Grandmother        Social History:  reports that she has quit smoking. She has never used smokeless tobacco. She reports that she does not drink alcohol and does not use drugs.    Allergies:  Review of patient's allergies indicates:  No Known Allergies    Medications:  Current Outpatient Medications   Medication Sig Dispense Refill     ARMOUR THYROID 30 mg Tab once daily.      cyanocobalamin, vitamin B-12, 3,000 mcg Cap Take by mouth once daily. Triple Blend with folate 680mcg      fluconazole (DIFLUCAN) 150 MG Tab Take one tab by mouth, repeat dose in 4 days 2 tablet 0    fluticasone propionate (FLONASE) 50 mcg/actuation nasal spray 1 spray by Each Nostril route once daily.      KRILL OIL ORAL Take 2,000 mg by mouth once daily.      losartan (COZAAR) 25 MG tablet once daily.      magnesium 200 mg Tab Take 400 mg by mouth once daily.      metFORMIN (GLUCOPHAGE) 500 MG tablet Take 500 mg by mouth 2 (two) times daily.      ondansetron (ZOFRAN-ODT) 4 MG TbDL Take 1 tablet (4 mg total) by mouth every 8 (eight) hours as needed (nausea). 12 tablet 0    promethazine (PHENERGAN) 25 MG tablet Take 1 tablet (25 mg total) by mouth every 6 (six) hours as needed for Nausea. 30 tablet 0    rosuvastatin (CRESTOR) 20 MG tablet Take 20 mg by mouth once daily.      sulfamethoxazole-trimethoprim 800-160mg (BACTRIM DS) 800-160 mg Tab Take 1 tablet by mouth 2 (two) times daily. for 7 days 14 tablet 0    traZODone (DESYREL) 50 MG tablet Take 1 tablet (50 mg total) by mouth nightly. 30 tablet 2    VITAMIN A ORAL Take 5,000 Int'l Units/L by mouth once daily.      vitamin K2 45 mcg Cap Take by mouth once daily.      zinc gluconate 50 mg tablet Take 25 mg by mouth once daily.      ZINC ORAL Take 1 tablet by mouth once daily. Zinc 22mg with Quercetin 800mg combined      duke's soln (benadryl 30 mL, mylanta 30 mL, LIDOcaine 30 mL, nystatin 30 mL) 120mL Take 10 mLs by mouth 4 (four) times daily. (Patient not taking: Reported on 5/26/2023) 120 mL 0    mupirocin (BACTROBAN) 2 % ointment SMARTSIG:Both Nares       No current facility-administered medications for this visit.     Facility-Administered Medications Ordered in Other Visits   Medication Dose Route Frequency Provider Last Rate Last Admin    0.9%  NaCl infusion   Intravenous Continuous Marissa Lei NP      "      Review of Systems   Constitutional:  Negative for appetite change and unexpected weight change.   HENT:  Negative for mouth sores.    Eyes:  Negative for visual disturbance.   Respiratory:  Negative for cough and shortness of breath.    Cardiovascular:  Negative for chest pain.   Gastrointestinal:  Negative for abdominal pain and diarrhea.   Genitourinary:  Negative for difficulty urinating, dysuria, flank pain, frequency, hematuria, vaginal bleeding and vaginal discharge.   Musculoskeletal:  Negative for back pain.   Skin:  Negative for rash.   Neurological:  Negative for headaches.   Hematological:  Negative for adenopathy.   Psychiatric/Behavioral:  The patient is nervous/anxious.      ECOG Performance Status:   ECOG SCORE    0 - Fully active-able to carry on all pre-disease performance without restriction         Objective:      Vitals:   Vitals:    05/26/23 1246   BP: 124/76   BP Location: Right arm   Patient Position: Sitting   BP Method: Medium (Manual)   Pulse: 74   Resp: 16   Temp: 98.2 °F (36.8 °C)   TempSrc: Temporal   SpO2: 95%   Weight: 69.4 kg (153 lb)   Height: 5' 6" (1.676 m)         BMI: Body mass index is 24.69 kg/m².    Physical Exam  Vitals reviewed.   Constitutional:       General: She is not in acute distress.     Appearance: She is not diaphoretic.   HENT:      Head: Normocephalic and atraumatic.   Eyes:      General: No scleral icterus.  Cardiovascular:      Rate and Rhythm: Normal rate and regular rhythm.      Heart sounds: Normal heart sounds. No murmur heard.  Pulmonary:      Effort: Pulmonary effort is normal. No respiratory distress.   Abdominal:      General: Abdomen is flat. Bowel sounds are normal. There is no distension.      Palpations: Abdomen is soft.      Tenderness: There is no abdominal tenderness. There is no right CVA tenderness, left CVA tenderness or guarding.   Musculoskeletal:      Right lower leg: No edema.      Left lower leg: No edema.   Skin:     Coloration: " Skin is not jaundiced.      Findings: No rash.   Neurological:      Mental Status: She is alert and oriented to person, place, and time.   Psychiatric:         Mood and Affect: Mood normal.         Behavior: Behavior normal.       Laboratory Data:  Lab Results   Component Value Date    WBC 5.81 05/26/2023    HGB 12.8 05/26/2023    HCT 38.1 05/26/2023    MCV 93 05/26/2023     05/26/2023        Assessment:       1. Invasive ductal carcinoma of left breast in female    2. Urinary tract infection with hematuria, site unspecified    3. Vaginal irritation           Plan:   Invasive ductal carcinoma of the left breast  -no indication for neoadjuvant chemo, no indication for further imaging, no indication for oncotype especially with possible T1a or T1b IDC and age >70's   -ER: 5.4%+, WA: negative, HER-2 non assess initially then HER-2  +after IDC  was found  -significant discussion about adjuvant chemo vs endocrine but given her low ER/WA positivity, HER-2 therapy might be her only way for preventing this cancer from coming back  -Last DEXA scan  in 2016 showed osteopenia   -decision to move forward with ; began 5/22/23  -Proceed with C2D1  on 5/29/23  -Follow up with Dr. Hannah in one week with labs prior to appointment    UTI  Vaginal dryness/irritation  -Noted small amount of blood when using restroom 5/22; UA positive for bacteria, 3+ blood  -Due to recent vaginal yeast infection, also sent Rx for Diflucan ; repeat dose in 3-4 days   -Patient to use vaginal gels such as RepHresh PRN   -Discussed symptoms could be due to stopping hormone supplements recently, vaginal atrophy, lingering yeast infection, UTI  -Symptoms have resolved, finish abx as prescribed  -Patient to make appointment with GYN for exam and to discuss concerns of recent bleeding/symptoms  -Monitor       Assessment/Plan reviewed and approved by Dr. Hannah.    30 minutes were spent in coordination of patient's care, record review and  counseling.    Route Chart for Scheduling    Med Onc Chart Routing      Follow up with physician 1 week. Dr. Hannah with labs prior   Follow up with [facility]    Infusion scheduling note Proceed with treatment 5/29/23   Injection scheduling note    Labs    Imaging    Pharmacy appointment    Other referrals           Treatment Plan Information   OP BREAST TRASTUZUMAB PACLITAXEL WEEKLY   Alexandra Hannah MD   Upcoming Treatment Dates - OP BREAST TRASTUZUMAB PACLITAXEL WEEKLY    5/29/2023       Pre-Medications       diphenhydrAMINE (BENADRYL) 50 mg in NS 50 mL IVPB       dexAMETHasone (DECADRON) 10 mg in sodium chloride 0.9% 50 mL IVPB       famotidine (PF) injection 20 mg       Chemotherapy       trastuzumab-dkst (OGIVRI) 139 mg in sodium chloride 0.9% 250 mL chemo infusion       PACLitaxeL (TAXOL) 80 mg/m2 = 144 mg in sodium chloride 0.9% 250 mL chemo infusion  6/5/2023       Pre-Medications       diphenhydrAMINE (BENADRYL) 50 mg in NS 50 mL IVPB       dexAMETHasone (DECADRON) 10 mg in sodium chloride 0.9% 50 mL IVPB       famotidine (PF) injection 20 mg       Chemotherapy       trastuzumab-dkst (OGIVRI) 139 mg in sodium chloride 0.9% 250 mL chemo infusion       PACLitaxeL (TAXOL) 80 mg/m2 = 144 mg in sodium chloride 0.9% 250 mL chemo infusion  6/12/2023       Pre-Medications       diphenhydrAMINE (BENADRYL) 50 mg in NS 50 mL IVPB       dexAMETHasone (DECADRON) 10 mg in sodium chloride 0.9% 50 mL IVPB       famotidine (PF) injection 20 mg       Chemotherapy       trastuzumab-dkst (OGIVRI) 139 mg in sodium chloride 0.9% 250 mL chemo infusion       PACLitaxeL (TAXOL) 80 mg/m2 = 144 mg in sodium chloride 0.9% 250 mL chemo infusion  6/19/2023       Pre-Medications       diphenhydrAMINE (BENADRYL) 50 mg in NS 50 mL IVPB       dexAMETHasone (DECADRON) 10 mg in sodium chloride 0.9% 50 mL IVPB       famotidine (PF) injection 20 mg       Chemotherapy       trastuzumab-dkst (OGIVRI) 139 mg in sodium chloride 0.9% 250 mL chemo infusion        PACLitaxeL (TAXOL) 80 mg/m2 = 144 mg in sodium chloride 0.9% 250 mL chemo infusion

## 2023-05-26 NOTE — TELEPHONE ENCOUNTER
Spoke with pt about chemo  questionnaire, she is achy, having joint pain, pain in knees and hips. Cramping in lower abdomen. Will be in today to see Lucy Wheeler and will discuss course of action at that time.

## 2023-05-27 ENCOUNTER — PATIENT MESSAGE (OUTPATIENT)
Dept: ADMINISTRATIVE | Facility: OTHER | Age: 73
End: 2023-05-27
Payer: MEDICARE

## 2023-05-28 ENCOUNTER — PATIENT MESSAGE (OUTPATIENT)
Dept: ADMINISTRATIVE | Facility: OTHER | Age: 73
End: 2023-05-28
Payer: MEDICARE

## 2023-05-29 ENCOUNTER — PATIENT MESSAGE (OUTPATIENT)
Dept: ADMINISTRATIVE | Facility: OTHER | Age: 73
End: 2023-05-29
Payer: MEDICARE

## 2023-05-29 ENCOUNTER — INFUSION (OUTPATIENT)
Dept: INFUSION THERAPY | Facility: HOSPITAL | Age: 73
End: 2023-05-29
Attending: STUDENT IN AN ORGANIZED HEALTH CARE EDUCATION/TRAINING PROGRAM
Payer: MEDICARE

## 2023-05-29 VITALS
HEIGHT: 66 IN | HEART RATE: 93 BPM | BODY MASS INDEX: 24.59 KG/M2 | SYSTOLIC BLOOD PRESSURE: 123 MMHG | WEIGHT: 153 LBS | TEMPERATURE: 98 F | RESPIRATION RATE: 18 BRPM | DIASTOLIC BLOOD PRESSURE: 72 MMHG

## 2023-05-29 DIAGNOSIS — C50.912 INVASIVE DUCTAL CARCINOMA OF LEFT BREAST IN FEMALE: ICD-10-CM

## 2023-05-29 DIAGNOSIS — D05.12 DUCTAL CARCINOMA IN SITU (DCIS) OF LEFT BREAST: Primary | ICD-10-CM

## 2023-05-29 PROCEDURE — 96375 TX/PRO/DX INJ NEW DRUG ADDON: CPT | Mod: PN

## 2023-05-29 PROCEDURE — 63600175 PHARM REV CODE 636 W HCPCS: Mod: JZ,JG,PN

## 2023-05-29 PROCEDURE — 96417 CHEMO IV INFUS EACH ADDL SEQ: CPT | Mod: PN

## 2023-05-29 PROCEDURE — 25000003 PHARM REV CODE 250: Mod: PN

## 2023-05-29 PROCEDURE — 96413 CHEMO IV INFUSION 1 HR: CPT | Mod: PN

## 2023-05-29 PROCEDURE — 96367 TX/PROPH/DG ADDL SEQ IV INF: CPT | Mod: PN

## 2023-05-29 RX ORDER — FAMOTIDINE 10 MG/ML
20 INJECTION INTRAVENOUS
Status: COMPLETED | OUTPATIENT
Start: 2023-05-29 | End: 2023-05-29

## 2023-05-29 RX ADMIN — DIPHENHYDRAMINE HYDROCHLORIDE 50 MG: 50 INJECTION INTRAMUSCULAR; INTRAVENOUS at 12:05

## 2023-05-29 RX ADMIN — TRASTUZUMAB 139 MG: 150 INJECTION, POWDER, LYOPHILIZED, FOR SOLUTION INTRAVENOUS at 12:05

## 2023-05-29 RX ADMIN — FAMOTIDINE 20 MG: 10 INJECTION INTRAVENOUS at 01:05

## 2023-05-29 RX ADMIN — PACLITAXEL 144 MG: 6 INJECTION, SOLUTION, CONCENTRATE INTRAVENOUS at 02:05

## 2023-05-29 RX ADMIN — DEXAMETHASONE SODIUM PHOSPHATE 10 MG: 4 INJECTION INTRA-ARTICULAR; INTRALESIONAL; INTRAMUSCULAR; INTRAVENOUS; SOFT TISSUE at 01:05

## 2023-05-29 NOTE — PLAN OF CARE
Pt arrived to clinic today for Taxol/Ogivri infusions and tolerated well. No changes throughout therapy. Pt aware of follow up appointments and side effects of drugs. Discharged to home. NAD.

## 2023-05-30 ENCOUNTER — PATIENT MESSAGE (OUTPATIENT)
Dept: ADMINISTRATIVE | Facility: OTHER | Age: 73
End: 2023-05-30
Payer: MEDICARE

## 2023-06-01 ENCOUNTER — PATIENT MESSAGE (OUTPATIENT)
Dept: ADMINISTRATIVE | Facility: OTHER | Age: 73
End: 2023-06-01
Payer: MEDICARE

## 2023-06-02 ENCOUNTER — OFFICE VISIT (OUTPATIENT)
Dept: HEMATOLOGY/ONCOLOGY | Facility: CLINIC | Age: 73
End: 2023-06-02
Payer: MEDICARE

## 2023-06-02 ENCOUNTER — PATIENT MESSAGE (OUTPATIENT)
Dept: ADMINISTRATIVE | Facility: OTHER | Age: 73
End: 2023-06-02
Payer: MEDICARE

## 2023-06-02 ENCOUNTER — TELEPHONE (OUTPATIENT)
Dept: HEMATOLOGY/ONCOLOGY | Facility: CLINIC | Age: 73
End: 2023-06-02
Payer: MEDICARE

## 2023-06-02 ENCOUNTER — LAB VISIT (OUTPATIENT)
Dept: LAB | Facility: HOSPITAL | Age: 73
End: 2023-06-02
Attending: STUDENT IN AN ORGANIZED HEALTH CARE EDUCATION/TRAINING PROGRAM
Payer: MEDICARE

## 2023-06-02 VITALS
HEIGHT: 66 IN | DIASTOLIC BLOOD PRESSURE: 82 MMHG | RESPIRATION RATE: 16 BRPM | BODY MASS INDEX: 24.06 KG/M2 | TEMPERATURE: 98 F | OXYGEN SATURATION: 98 % | SYSTOLIC BLOOD PRESSURE: 160 MMHG | WEIGHT: 149.69 LBS | HEART RATE: 107 BPM

## 2023-06-02 DIAGNOSIS — R45.89 ANXIETY ABOUT HEALTH: ICD-10-CM

## 2023-06-02 DIAGNOSIS — N93.9 VAGINAL BLEEDING: ICD-10-CM

## 2023-06-02 DIAGNOSIS — C50.912 INVASIVE DUCTAL CARCINOMA OF LEFT BREAST IN FEMALE: Primary | ICD-10-CM

## 2023-06-02 DIAGNOSIS — C50.912 INVASIVE DUCTAL CARCINOMA OF LEFT BREAST IN FEMALE: ICD-10-CM

## 2023-06-02 DIAGNOSIS — Z91.89 AT RISK FOR LYMPHEDEMA: ICD-10-CM

## 2023-06-02 DIAGNOSIS — D05.12 DUCTAL CARCINOMA IN SITU (DCIS) OF LEFT BREAST: ICD-10-CM

## 2023-06-02 LAB
ALBUMIN SERPL BCP-MCNC: 4.1 G/DL (ref 3.5–5.2)
ALP SERPL-CCNC: 43 U/L (ref 55–135)
ALT SERPL W/O P-5'-P-CCNC: 32 U/L (ref 10–44)
ANION GAP SERPL CALC-SCNC: 10 MMOL/L (ref 8–16)
AST SERPL-CCNC: 24 U/L (ref 10–40)
BASOPHILS # BLD AUTO: 0.04 K/UL (ref 0–0.2)
BASOPHILS NFR BLD: 0.9 % (ref 0–1.9)
BILIRUB SERPL-MCNC: 0.4 MG/DL (ref 0.1–1)
BUN SERPL-MCNC: 13 MG/DL (ref 8–23)
CALCIUM SERPL-MCNC: 9.2 MG/DL (ref 8.7–10.5)
CHLORIDE SERPL-SCNC: 105 MMOL/L (ref 95–110)
CO2 SERPL-SCNC: 24 MMOL/L (ref 23–29)
CREAT SERPL-MCNC: 0.9 MG/DL (ref 0.5–1.4)
DIFFERENTIAL METHOD: ABNORMAL
EOSINOPHIL # BLD AUTO: 0.2 K/UL (ref 0–0.5)
EOSINOPHIL NFR BLD: 3.6 % (ref 0–8)
ERYTHROCYTE [DISTWIDTH] IN BLOOD BY AUTOMATED COUNT: 11.9 % (ref 11.5–14.5)
EST. GFR  (NO RACE VARIABLE): >60 ML/MIN/1.73 M^2
GLUCOSE SERPL-MCNC: 120 MG/DL (ref 70–110)
HCT VFR BLD AUTO: 39 % (ref 37–48.5)
HGB BLD-MCNC: 13 G/DL (ref 12–16)
IMM GRANULOCYTES # BLD AUTO: 0.01 K/UL (ref 0–0.04)
IMM GRANULOCYTES NFR BLD AUTO: 0.2 % (ref 0–0.5)
LYMPHOCYTES # BLD AUTO: 1.8 K/UL (ref 1–4.8)
LYMPHOCYTES NFR BLD: 39.1 % (ref 18–48)
MCH RBC QN AUTO: 31.2 PG (ref 27–31)
MCHC RBC AUTO-ENTMCNC: 33.3 G/DL (ref 32–36)
MCV RBC AUTO: 94 FL (ref 82–98)
MONOCYTES # BLD AUTO: 0.1 K/UL (ref 0.3–1)
MONOCYTES NFR BLD: 2.6 % (ref 4–15)
NEUTROPHILS # BLD AUTO: 2.5 K/UL (ref 1.8–7.7)
NEUTROPHILS NFR BLD: 53.6 % (ref 38–73)
NRBC BLD-RTO: 0 /100 WBC
PLATELET # BLD AUTO: 223 K/UL (ref 150–450)
PMV BLD AUTO: 9.6 FL (ref 9.2–12.9)
POTASSIUM SERPL-SCNC: 4 MMOL/L (ref 3.5–5.1)
PROT SERPL-MCNC: 7 G/DL (ref 6–8.4)
RBC # BLD AUTO: 4.17 M/UL (ref 4–5.4)
SODIUM SERPL-SCNC: 139 MMOL/L (ref 136–145)
WBC # BLD AUTO: 4.68 K/UL (ref 3.9–12.7)

## 2023-06-02 PROCEDURE — 99999 PR PBB SHADOW E&M-EST. PATIENT-LVL V: CPT | Mod: PBBFAC,,, | Performed by: STUDENT IN AN ORGANIZED HEALTH CARE EDUCATION/TRAINING PROGRAM

## 2023-06-02 PROCEDURE — 99215 OFFICE O/P EST HI 40 MIN: CPT | Mod: S$PBB,,, | Performed by: STUDENT IN AN ORGANIZED HEALTH CARE EDUCATION/TRAINING PROGRAM

## 2023-06-02 PROCEDURE — 36415 COLL VENOUS BLD VENIPUNCTURE: CPT | Mod: PN | Performed by: STUDENT IN AN ORGANIZED HEALTH CARE EDUCATION/TRAINING PROGRAM

## 2023-06-02 PROCEDURE — 99215 OFFICE O/P EST HI 40 MIN: CPT | Mod: PBBFAC,PN | Performed by: STUDENT IN AN ORGANIZED HEALTH CARE EDUCATION/TRAINING PROGRAM

## 2023-06-02 PROCEDURE — 80053 COMPREHEN METABOLIC PANEL: CPT | Mod: PN | Performed by: STUDENT IN AN ORGANIZED HEALTH CARE EDUCATION/TRAINING PROGRAM

## 2023-06-02 PROCEDURE — 85025 COMPLETE CBC W/AUTO DIFF WBC: CPT | Mod: PN | Performed by: STUDENT IN AN ORGANIZED HEALTH CARE EDUCATION/TRAINING PROGRAM

## 2023-06-02 PROCEDURE — 99999 PR PBB SHADOW E&M-EST. PATIENT-LVL V: ICD-10-PCS | Mod: PBBFAC,,, | Performed by: STUDENT IN AN ORGANIZED HEALTH CARE EDUCATION/TRAINING PROGRAM

## 2023-06-02 PROCEDURE — 99215 PR OFFICE/OUTPT VISIT, EST, LEVL V, 40-54 MIN: ICD-10-PCS | Mod: S$PBB,,, | Performed by: STUDENT IN AN ORGANIZED HEALTH CARE EDUCATION/TRAINING PROGRAM

## 2023-06-02 NOTE — PROGRESS NOTES
PATIENT: Margaret Rouse  MRN: [id number]  DATE: 6/18/2023    Diagnosis:   1. Invasive ductal carcinoma of left breast in female    2. Vaginal bleeding    3. Ductal carcinoma in situ (DCIS) of left breast    4. Anxiety about health          Chief Complaint: Invasive ductal carcinoma of left breast in female    Subjective:   HPI: Ms. Rouse is a 72 y.o. female with invasive ductal carcinoma of the left breast who is here today for follow up and consideration of C2D1 Taxol/Ogivri on 5/29/23.    She began treatment with Paclitaxel and Trastuzumab on 5/22/23. Was seen earlier this week for UTI, and vaginal irritation. Was given prescriptions for Fluconazole and Bactrim DS. Her UTI symptoms have improved. Still concerned about blood/vaginal discharge, previously discussed this could be related to stopping her hormone pellets.  Ultraound and OBGYN james     Began having some myalgias, joint pain yesterday.   Denies HA, CP, SOB, cough, abd pain, diarrhea, constipation, dysuria, swelling, new lumps or bumps, rashes. Dneies fevers, chills.     Oncology History:   2/23/23 Seen for a newly diagnosed stage IA IDC of the left breast. MRI Breasts: clumped non-mass enhancement with associated clip 3.1 x 1.2 x 2.9cm with sub-cm satellites inferiorly up to 6mm at 1.6cm inferior.  No abnormal SARAH. Biopsy positive for IDC/DCIS, ER; low positive, OH: negative, no HER-2 since was insufficient invasive tumor for IDC and was run on the DCIS part.    5/11/23 ECHO with EF 65%  Oncology History   Invasive ductal carcinoma of left breast in female   2/27/2023 Initial Diagnosis    Invasive ductal carcinoma of left breast in female     2/27/2023 Cancer Staged    Staging form: Breast, AJCC 8th Edition  - Clinical stage from 2/27/2023: cT1mi, cN0, cM0, G3, ER+, OH-, HER2: Not Assessed     5/1/2023 Cancer Staged    Staging form: Breast, AJCC 8th Edition  - Pathologic stage from 5/1/2023: Stage IA (pT1a, pN0(sn), cM0, G2, ER-, OH-, HER2+)      5/22/2023 -  Chemotherapy    Treatment Summary   Plan Name: OP BREAST TRASTUZUMAB PACLITAXEL WEEKLY  Treatment Goal: Curative  Status: Active  Start Date: 5/22/2023  End Date: 4/22/2024 (Planned)  Provider: Alexandra Hannah MD  Chemotherapy: PACLitaxeL (TAXOL) 80 mg/m2 = 144 mg in sodium chloride 0.9% 250 mL chemo infusion, 80 mg/m2 = 144 mg, Intravenous, Clinic/HOD 1 time, 4 of 12 cycles  Administration: 144 mg (5/22/2023), 144 mg (5/29/2023), 144 mg (6/5/2023), 144 mg (6/12/2023)  trastuzumab-dkst (OGIVRI) 278 mg in sodium chloride 0.9% 298.2 mL chemo infusion, 4 mg/kg = 278 mg, Intravenous, Clinic/HOD 1 time, 4 of 25 cycles  Administration: 278 mg (5/22/2023), 139 mg (5/29/2023), 136 mg (6/5/2023), 136 mg (6/12/2023)     Ductal carcinoma in situ (DCIS) of left breast   3/5/2023 Initial Diagnosis    Ductal carcinoma in situ (DCIS) of left breast     5/22/2023 -  Chemotherapy    Treatment Summary   Plan Name: OP BREAST TRASTUZUMAB PACLITAXEL WEEKLY  Treatment Goal: Curative  Status: Active  Start Date: 5/22/2023  End Date: 4/22/2024 (Planned)  Provider: Alexandra Hannah MD  Chemotherapy: PACLitaxeL (TAXOL) 80 mg/m2 = 144 mg in sodium chloride 0.9% 250 mL chemo infusion, 80 mg/m2 = 144 mg, Intravenous, Clinic/HOD 1 time, 4 of 12 cycles  Administration: 144 mg (5/22/2023), 144 mg (5/29/2023), 144 mg (6/5/2023), 144 mg (6/12/2023)  trastuzumab-dkst (OGIVRI) 278 mg in sodium chloride 0.9% 298.2 mL chemo infusion, 4 mg/kg = 278 mg, Intravenous, Clinic/HOD 1 time, 4 of 25 cycles  Administration: 278 mg (5/22/2023), 139 mg (5/29/2023), 136 mg (6/5/2023), 136 mg (6/12/2023)        Past Medical History:   Past Medical History:   Diagnosis Date    Abnormal results of liver function studies     Cancer     left breast cancer    Chest pain     Fatigue     H/O: pneumonia     History of chicken pox     Hyperlipidemia, mixed     Hypertension     Menopausal syndrome     Palpitations 12/15/2015    PONV (postoperative nausea and  vomiting)     Thyroid disease        Past Surgical HIstory:   Past Surgical History:   Procedure Laterality Date    BREAST RECONSTRUCTION Left 4/20/2023    Procedure: RECONSTRUCTION, BREAST;  Surgeon: Pipo Bro MD;  Location: [facility] OR;  Service: Plastics;  Laterality: Left;    CATARACT EXTRACTION W/ INTRAOCULAR LENS  IMPLANT, BILATERAL      COLONOSCOPY      COSMETIC SURGERY      DEBRIDEMENT AND CLOSURE OF WOUND OF FINGER Left 11/29/2018    Procedure: DEBRIDEMENT, WOUND, FINGER, WITH CLOSURE, VY FLap;  Surgeon: Frederic Barbour MD;  Location: [facility] OR;  Service: Plastics;  Laterality: Left;    INSERTION OF BREAST IMPLANT Left 4/20/2023    Procedure: INSERTION, BREAST IMPLANT;  Surgeon: Pipo Bro MD;  Location: [facility] OR;  Service: Plastics;  Laterality: Left;    KNEE SURGERY Right 2015    LEFT HEART CATHETERIZATION Left 11/18/2021    Procedure: CATHETERIZATION, HEART, LEFT;  Surgeon: Basim Castrejon MD;  Location: [facility] CATH;  Service: Cardiology;  Laterality: Left;    PLACEMENT OF ACELLULAR HUMAN DERMAL ALLOGRAFT Left 4/20/2023    Procedure: APPLICATION, ACELLULAR HUMAN DERMAL ALLOGRAFT;  Surgeon: Pipo Bro MD;  Location: [facility] OR;  Service: Plastics;  Laterality: Left;    SENTINEL LYMPH NODE BIOPSY Left 4/20/2023    Procedure: BIOPSY, LYMPH NODE, SENTINEL;  Surgeon: Amanda Mcclellan MD;  Location: [facility] OR;  Service: General;  Laterality: Left;    SIMPLE MASTECTOMY Left 4/20/2023    Procedure: MASTECTOMY, SIMPLE;  Surgeon: Amanda Mcclellan MD;  Location: [facility] OR;  Service: General;  Laterality: Left;    TONSILLECTOMY  1955       Family History:   Family History   Problem Relation Age of Onset    No Known Problems Mother     Heart disease Father     Heart attack Father         at age 64    Cancer Sister         folicular non-hodgkins lymphoma    Lymphoma Sister     Diabetes Maternal Grandmother        Social History:  reports that she has quit smoking. She has never used smokeless tobacco. She reports  that she does not drink alcohol and does not use drugs.    Allergies:  Review of patient's allergies indicates:  No Known Allergies    Medications:  Current Outpatient Medications   Medication Sig Dispense Refill    ARMOUR THYROID 30 mg Tab once daily.      cyanocobalamin, vitamin B-12, 3,000 mcg Cap Take by mouth once daily. Triple Blend with folate 680mcg      duke's soln (benadryl 30 mL, mylanta 30 mL, LIDOcaine 30 mL, nystatin 30 mL) 120mL Take 10 mLs by mouth 4 (four) times daily. 120 mL 0    fluticasone propionate (FLONASE) 50 mcg/actuation nasal spray 1 spray by Each Nostril route once daily.      KRILL OIL ORAL Take 2,000 mg by mouth once daily.      losartan (COZAAR) 25 MG tablet once daily.      magnesium 200 mg Tab Take 400 mg by mouth once daily.      metFORMIN (GLUCOPHAGE) 500 MG tablet Take 500 mg by mouth 2 (two) times daily.      mupirocin (BACTROBAN) 2 % ointment SMARTSIG:Both Nares      ondansetron (ZOFRAN-ODT) 4 MG TbDL Take 1 tablet (4 mg total) by mouth every 8 (eight) hours as needed (nausea). 12 tablet 0    promethazine (PHENERGAN) 25 MG tablet Take 1 tablet (25 mg total) by mouth every 6 (six) hours as needed for Nausea. 30 tablet 0    rosuvastatin (CRESTOR) 20 MG tablet Take 20 mg by mouth once daily.      traZODone (DESYREL) 50 MG tablet Take 1 tablet (50 mg total) by mouth nightly. 30 tablet 2    VITAMIN A ORAL Take 5,000 Int'l Units/L by mouth once daily.      vitamin K2 45 mcg Cap Take by mouth once daily.      zinc gluconate 50 mg tablet Take 25 mg by mouth once daily.      fluconazole (DIFLUCAN) 150 MG Tab Take one tab by mouth, repeat dose in 4 days 2 tablet 0     No current facility-administered medications for this visit.     Facility-Administered Medications Ordered in Other Visits   Medication Dose Route Frequency Provider Last Rate Last Admin    0.9%  NaCl infusion   Intravenous Continuous Marissa Lei NP           Review of Systems   Constitutional:  Negative for appetite  "change and unexpected weight change.   HENT:  Negative for mouth sores.    Eyes:  Negative for visual disturbance.   Respiratory:  Negative for cough and shortness of breath.    Cardiovascular:  Negative for chest pain.   Gastrointestinal:  Negative for abdominal pain and diarrhea.   Genitourinary:  Negative for difficulty urinating, dysuria, flank pain, frequency, hematuria, vaginal bleeding and vaginal discharge.   Musculoskeletal:  Negative for back pain.   Skin:  Negative for rash.   Neurological:  Negative for headaches.   Hematological:  Negative for adenopathy.   Psychiatric/Behavioral:  The patient is nervous/anxious.      ECOG Performance Status:   ECOG SCORE             Objective:      Vitals:   Vitals:    06/02/23 1138   BP: (!) 160/82   Pulse: 107   Resp: 16   Temp: 98.2 °F (36.8 °C)   TempSrc: Temporal   SpO2: 98%   Weight: 67.9 kg (149 lb 11.1 oz)   Height: 5' 6" (1.676 m)         BMI: Body mass index is 24.16 kg/m².    Physical Exam  Vitals reviewed.   Constitutional:       General: She is not in acute distress.     Appearance: She is not diaphoretic.   HENT:      Head: Normocephalic and atraumatic.   Eyes:      General: No scleral icterus.  Cardiovascular:      Rate and Rhythm: Normal rate and regular rhythm.      Heart sounds: Normal heart sounds. No murmur heard.  Pulmonary:      Effort: Pulmonary effort is normal. No respiratory distress.   Abdominal:      General: Abdomen is flat. Bowel sounds are normal. There is no distension.      Palpations: Abdomen is soft.      Tenderness: There is no abdominal tenderness. There is no right CVA tenderness, left CVA tenderness or guarding.   Musculoskeletal:      Right lower leg: No edema.      Left lower leg: No edema.   Skin:     Coloration: Skin is not jaundiced.      Findings: No rash.   Neurological:      Mental Status: She is alert and oriented to person, place, and time.   Psychiatric:         Mood and Affect: Mood normal.         Behavior: Behavior " normal.       Laboratory Data:  Lab Results   Component Value Date    WBC 4.53 06/16/2023    HGB 12.5 06/16/2023    HCT 37.5 06/16/2023    MCV 93 06/16/2023     06/16/2023        Assessment:       1. Invasive ductal carcinoma of left breast in female    2. Vaginal bleeding    3. Ductal carcinoma in situ (DCIS) of left breast    4. Anxiety about health             Plan:   Invasive ductal carcinoma of the left breast  -no indication for neoadjuvant chemo, no indication for further imaging, no indication for oncotype especially with possible T1a or T1b IDC and age >70's   -ER: 5.4%+, CT: negative, HER-2 non assess initially then HER-2  +after IDC  was found  -significant discussion about adjuvant chemo vs endocrine but given her low ER/CT positivity, HER-2 therapy might be her only way for preventing this cancer from coming back  -Last DEXA scan  in 2016 showed osteopenia   -decision to move forward with ; began 5/22/23  -Proceed with C3D1  on 5/29/23    UTI  Vaginal dryness/irritation  -Noted small amount of blood when using restroom 5/22; UA positive for bacteria, 3+ blood  -Due to recent vaginal yeast infection, also sent Rx for Diflucan ; repeat dose in 3-4 days   -Patient to use vaginal gels such as RepHresh PRN   -Discussed symptoms could be due to stopping hormone supplements recently, vaginal atrophy, lingering yeast infection, UTI  -Symptoms have resolved, finish abx as prescribed  - appointment with GYN and Ultrasound     42 minutes were spent in coordination of patient's care, record review and counseling.    Route Chart for Scheduling    Med Onc Chart Routing      Follow up with physician . Transvaginal Ultrasound as soon as possible (Monday)   Follow up with SHADI    Infusion scheduling note    Injection scheduling note    Labs    Imaging    Pharmacy appointment    Other referrals            Treatment Plan Information   OP BREAST TRASTUZUMAB PACLITAXEL WEEKLY   Alexandra Hannah MD   Upcoming  Treatment Dates - OP BREAST TRASTUZUMAB PACLITAXEL WEEKLY    6/19/2023       Pre-Medications       diphenhydrAMINE (BENADRYL) 50 mg in NS 50 mL IVPB       dexAMETHasone (DECADRON) 10 mg in sodium chloride 0.9% 50 mL IVPB       famotidine (PF) injection 20 mg       Chemotherapy       trastuzumab-dkst (OGIVRI) 136 mg in sodium chloride 0.9% 250 mL chemo infusion       PACLitaxeL (TAXOL) 80 mg/m2 = 144 mg in sodium chloride 0.9% 250 mL chemo infusion  6/26/2023       Pre-Medications       diphenhydrAMINE (BENADRYL) 50 mg in NS 50 mL IVPB       dexAMETHasone (DECADRON) 10 mg in sodium chloride 0.9% 50 mL IVPB       famotidine (PF) injection 20 mg       Chemotherapy       trastuzumab-dkst (OGIVRI) 136 mg in sodium chloride 0.9% 250 mL chemo infusion       PACLitaxeL (TAXOL) 80 mg/m2 = 144 mg in sodium chloride 0.9% 250 mL chemo infusion  7/3/2023       Pre-Medications       diphenhydrAMINE (BENADRYL) 50 mg in NS 50 mL IVPB       dexAMETHasone (DECADRON) 10 mg in sodium chloride 0.9% 50 mL IVPB       famotidine (PF) injection 20 mg       Chemotherapy       trastuzumab-dkst (OGIVRI) 136 mg in sodium chloride 0.9% 250 mL chemo infusion       PACLitaxeL (TAXOL) 80 mg/m2 = 144 mg in sodium chloride 0.9% 250 mL chemo infusion  7/10/2023       Pre-Medications       diphenhydrAMINE (BENADRYL) 50 mg in NS 50 mL IVPB       dexAMETHasone (DECADRON) 10 mg in sodium chloride 0.9% 50 mL IVPB       famotidine (PF) injection 20 mg       Chemotherapy       trastuzumab-dkst (OGIVRI) 136 mg in sodium chloride 0.9% 250 mL chemo infusion       PACLitaxeL (TAXOL) 80 mg/m2 = 144 mg in sodium chloride 0.9% 250 mL chemo infusion

## 2023-06-02 NOTE — TELEPHONE ENCOUNTER
Called pt to get her Posthab scheduled pt accepted apt on 6/16 at 2:00 pm      ----- Message from Stefan Lubin MA sent at 5/11/2023  9:25 AM CDT -----  Regarding: lymph post op  Patient to be seen in 6-8 weeks following surgical date 4/20 for 1-2 visits for reassessment.

## 2023-06-03 ENCOUNTER — PATIENT MESSAGE (OUTPATIENT)
Dept: ADMINISTRATIVE | Facility: OTHER | Age: 73
End: 2023-06-03
Payer: MEDICARE

## 2023-06-04 ENCOUNTER — PATIENT MESSAGE (OUTPATIENT)
Dept: ADMINISTRATIVE | Facility: OTHER | Age: 73
End: 2023-06-04
Payer: MEDICARE

## 2023-06-05 ENCOUNTER — INFUSION (OUTPATIENT)
Dept: INFUSION THERAPY | Facility: HOSPITAL | Age: 73
End: 2023-06-05
Attending: STUDENT IN AN ORGANIZED HEALTH CARE EDUCATION/TRAINING PROGRAM
Payer: MEDICARE

## 2023-06-05 VITALS
TEMPERATURE: 98 F | RESPIRATION RATE: 16 BRPM | DIASTOLIC BLOOD PRESSURE: 73 MMHG | HEIGHT: 66 IN | WEIGHT: 152.56 LBS | BODY MASS INDEX: 24.52 KG/M2 | HEART RATE: 89 BPM | SYSTOLIC BLOOD PRESSURE: 119 MMHG

## 2023-06-05 DIAGNOSIS — C50.912 INVASIVE DUCTAL CARCINOMA OF LEFT BREAST IN FEMALE: ICD-10-CM

## 2023-06-05 DIAGNOSIS — D05.12 DUCTAL CARCINOMA IN SITU (DCIS) OF LEFT BREAST: Primary | ICD-10-CM

## 2023-06-05 PROCEDURE — 25000003 PHARM REV CODE 250: Mod: PN | Performed by: STUDENT IN AN ORGANIZED HEALTH CARE EDUCATION/TRAINING PROGRAM

## 2023-06-05 PROCEDURE — 96417 CHEMO IV INFUS EACH ADDL SEQ: CPT | Mod: PN

## 2023-06-05 PROCEDURE — 96413 CHEMO IV INFUSION 1 HR: CPT | Mod: PN

## 2023-06-05 PROCEDURE — A4216 STERILE WATER/SALINE, 10 ML: HCPCS | Mod: PN | Performed by: STUDENT IN AN ORGANIZED HEALTH CARE EDUCATION/TRAINING PROGRAM

## 2023-06-05 PROCEDURE — 96367 TX/PROPH/DG ADDL SEQ IV INF: CPT | Mod: PN

## 2023-06-05 PROCEDURE — 96375 TX/PRO/DX INJ NEW DRUG ADDON: CPT | Mod: PN

## 2023-06-05 PROCEDURE — 63600175 PHARM REV CODE 636 W HCPCS: Mod: PN | Performed by: STUDENT IN AN ORGANIZED HEALTH CARE EDUCATION/TRAINING PROGRAM

## 2023-06-05 RX ORDER — FAMOTIDINE 10 MG/ML
20 INJECTION INTRAVENOUS
Status: COMPLETED | OUTPATIENT
Start: 2023-06-05 | End: 2023-06-05

## 2023-06-05 RX ORDER — DIPHENHYDRAMINE HYDROCHLORIDE 50 MG/ML
50 INJECTION INTRAMUSCULAR; INTRAVENOUS ONCE AS NEEDED
Status: CANCELLED | OUTPATIENT
Start: 2023-06-05

## 2023-06-05 RX ORDER — FAMOTIDINE 10 MG/ML
20 INJECTION INTRAVENOUS
Status: CANCELLED | OUTPATIENT
Start: 2023-06-05

## 2023-06-05 RX ORDER — EPINEPHRINE 0.3 MG/.3ML
0.3 INJECTION SUBCUTANEOUS ONCE AS NEEDED
Status: DISCONTINUED | OUTPATIENT
Start: 2023-06-05 | End: 2023-06-05 | Stop reason: HOSPADM

## 2023-06-05 RX ORDER — SODIUM CHLORIDE 0.9 % (FLUSH) 0.9 %
10 SYRINGE (ML) INJECTION
Status: CANCELLED | OUTPATIENT
Start: 2023-06-05

## 2023-06-05 RX ORDER — SODIUM CHLORIDE 0.9 % (FLUSH) 0.9 %
10 SYRINGE (ML) INJECTION
Status: DISCONTINUED | OUTPATIENT
Start: 2023-06-05 | End: 2023-06-05 | Stop reason: HOSPADM

## 2023-06-05 RX ORDER — DIPHENHYDRAMINE HYDROCHLORIDE 50 MG/ML
50 INJECTION INTRAMUSCULAR; INTRAVENOUS ONCE AS NEEDED
Status: DISCONTINUED | OUTPATIENT
Start: 2023-06-05 | End: 2023-06-05 | Stop reason: HOSPADM

## 2023-06-05 RX ORDER — HEPARIN 100 UNIT/ML
500 SYRINGE INTRAVENOUS
Status: CANCELLED | OUTPATIENT
Start: 2023-06-05

## 2023-06-05 RX ORDER — EPINEPHRINE 0.3 MG/.3ML
0.3 INJECTION SUBCUTANEOUS ONCE AS NEEDED
Status: CANCELLED | OUTPATIENT
Start: 2023-06-05

## 2023-06-05 RX ADMIN — DEXAMETHASONE SODIUM PHOSPHATE 10 MG: 10 INJECTION, SOLUTION INTRAMUSCULAR; INTRAVENOUS at 12:06

## 2023-06-05 RX ADMIN — Medication 10 ML: at 11:06

## 2023-06-05 RX ADMIN — SODIUM CHLORIDE: 9 INJECTION, SOLUTION INTRAVENOUS at 11:06

## 2023-06-05 RX ADMIN — PACLITAXEL 144 MG: 6 INJECTION, SOLUTION, CONCENTRATE INTRAVENOUS at 02:06

## 2023-06-05 RX ADMIN — DIPHENHYDRAMINE HYDROCHLORIDE 50 MG: 50 INJECTION, SOLUTION INTRAMUSCULAR; INTRAVENOUS at 01:06

## 2023-06-05 RX ADMIN — TRASTUZUMAB 136 MG: 150 INJECTION, POWDER, LYOPHILIZED, FOR SOLUTION INTRAVENOUS at 12:06

## 2023-06-05 RX ADMIN — FAMOTIDINE 20 MG: 10 INJECTION INTRAVENOUS at 12:06

## 2023-06-05 NOTE — PLAN OF CARE
Problem: Fatigue (Oncology Care)  Goal: Improved Activity Tolerance  Intervention: Promote Improved Energy  Flowsheets (Taken 6/5/2023 1210)  Fatigue Management:   activity schedule adjusted   fatigue-related activity identified   frequent rest breaks encouraged   paced activity encouraged  Sleep/Rest Enhancement:   relaxation techniques promoted   regular sleep/rest pattern promoted   natural light exposure provided  Activity Management:   Ambulated -L4   Ambulated to bathroom - L4   Ambulated in loaiza - L4   Ambulated in room - L4   Up in stretcher chair - L1     Problem: Adult Inpatient Plan of Care  Goal: Patient-Specific Goal (Individualized)  Outcome: Ongoing, Progressing  Flowsheets (Taken 6/5/2023 1210)  Anxieties, Fears or Concerns: PIV access  Individualized Care Needs: recliner, warm blanket, pillow, dim lights, convesation,  at chairside

## 2023-06-05 NOTE — PLAN OF CARE
Problem: Adult Inpatient Plan of Care  Goal: Plan of Care Review  6/5/2023 9946 by Thania Holbrook, RN  Outcome: Ongoing, Progressing  Flowsheets (Taken 6/5/2023 1530)  Plan of Care Reviewed With:   patient   spouse   Pt tolerated her Ogivri and Taxol infusion well, NAD. No new c/o voiced. Pt given a schedule and reviewed, pt verbalized understanding. Pt ambulated out of the clinic without difficulty accompanied by her .

## 2023-06-06 ENCOUNTER — PATIENT MESSAGE (OUTPATIENT)
Dept: ADMINISTRATIVE | Facility: OTHER | Age: 73
End: 2023-06-06
Payer: MEDICARE

## 2023-06-07 ENCOUNTER — PATIENT MESSAGE (OUTPATIENT)
Dept: ADMINISTRATIVE | Facility: OTHER | Age: 73
End: 2023-06-07
Payer: MEDICARE

## 2023-06-07 ENCOUNTER — HOSPITAL ENCOUNTER (OUTPATIENT)
Dept: RADIOLOGY | Facility: HOSPITAL | Age: 73
Discharge: HOME | End: 2023-06-07
Attending: STUDENT IN AN ORGANIZED HEALTH CARE EDUCATION/TRAINING PROGRAM
Payer: MEDICARE

## 2023-06-07 DIAGNOSIS — N93.9 VAGINAL BLEEDING: ICD-10-CM

## 2023-06-07 DIAGNOSIS — C50.912 INVASIVE DUCTAL CARCINOMA OF LEFT BREAST IN FEMALE: ICD-10-CM

## 2023-06-07 PROCEDURE — 76830 US PELVIS COMP WITH TRANSVAG NON-OB (XPD): ICD-10-PCS | Mod: 26,,, | Performed by: RADIOLOGY

## 2023-06-07 PROCEDURE — 76856 US EXAM PELVIC COMPLETE: CPT | Mod: TC,PO

## 2023-06-07 PROCEDURE — 76830 TRANSVAGINAL US NON-OB: CPT | Mod: 26,,, | Performed by: RADIOLOGY

## 2023-06-07 PROCEDURE — 76856 US EXAM PELVIC COMPLETE: CPT | Mod: 26,,, | Performed by: RADIOLOGY

## 2023-06-07 PROCEDURE — 76856 US PELVIS COMP WITH TRANSVAG NON-OB (XPD): ICD-10-PCS | Mod: 26,,, | Performed by: RADIOLOGY

## 2023-06-08 ENCOUNTER — PATIENT MESSAGE (OUTPATIENT)
Dept: ADMINISTRATIVE | Facility: OTHER | Age: 73
End: 2023-06-08
Payer: MEDICARE

## 2023-06-09 ENCOUNTER — LAB VISIT (OUTPATIENT)
Dept: LAB | Facility: HOSPITAL | Age: 73
End: 2023-06-09
Attending: STUDENT IN AN ORGANIZED HEALTH CARE EDUCATION/TRAINING PROGRAM
Payer: MEDICARE

## 2023-06-09 ENCOUNTER — OFFICE VISIT (OUTPATIENT)
Dept: HEMATOLOGY/ONCOLOGY | Facility: CLINIC | Age: 73
End: 2023-06-09
Payer: MEDICARE

## 2023-06-09 ENCOUNTER — PATIENT MESSAGE (OUTPATIENT)
Dept: ADMINISTRATIVE | Facility: OTHER | Age: 73
End: 2023-06-09
Payer: MEDICARE

## 2023-06-09 VITALS
OXYGEN SATURATION: 98 % | RESPIRATION RATE: 16 BRPM | SYSTOLIC BLOOD PRESSURE: 162 MMHG | WEIGHT: 150.81 LBS | TEMPERATURE: 97 F | HEIGHT: 66 IN | HEART RATE: 89 BPM | DIASTOLIC BLOOD PRESSURE: 82 MMHG | BODY MASS INDEX: 24.24 KG/M2

## 2023-06-09 DIAGNOSIS — C50.912 INVASIVE DUCTAL CARCINOMA OF LEFT BREAST IN FEMALE: Primary | ICD-10-CM

## 2023-06-09 DIAGNOSIS — R45.89 ANXIETY ABOUT HEALTH: ICD-10-CM

## 2023-06-09 DIAGNOSIS — F43.9 STRESS: ICD-10-CM

## 2023-06-09 DIAGNOSIS — I10 PRIMARY HYPERTENSION: ICD-10-CM

## 2023-06-09 DIAGNOSIS — C50.912 INVASIVE DUCTAL CARCINOMA OF LEFT BREAST IN FEMALE: ICD-10-CM

## 2023-06-09 LAB
ALBUMIN SERPL BCP-MCNC: 3.9 G/DL (ref 3.5–5.2)
ALP SERPL-CCNC: 41 U/L (ref 55–135)
ALT SERPL W/O P-5'-P-CCNC: 26 U/L (ref 10–44)
ANION GAP SERPL CALC-SCNC: 13 MMOL/L (ref 8–16)
AST SERPL-CCNC: 17 U/L (ref 10–40)
BASOPHILS # BLD AUTO: 0.04 K/UL (ref 0–0.2)
BASOPHILS NFR BLD: 1 % (ref 0–1.9)
BILIRUB SERPL-MCNC: 0.3 MG/DL (ref 0.1–1)
BUN SERPL-MCNC: 15 MG/DL (ref 8–23)
CALCIUM SERPL-MCNC: 9.2 MG/DL (ref 8.7–10.5)
CHLORIDE SERPL-SCNC: 104 MMOL/L (ref 95–110)
CO2 SERPL-SCNC: 23 MMOL/L (ref 23–29)
CREAT SERPL-MCNC: 0.8 MG/DL (ref 0.5–1.4)
DIFFERENTIAL METHOD: ABNORMAL
EOSINOPHIL # BLD AUTO: 0.1 K/UL (ref 0–0.5)
EOSINOPHIL NFR BLD: 3.1 % (ref 0–8)
ERYTHROCYTE [DISTWIDTH] IN BLOOD BY AUTOMATED COUNT: 11.9 % (ref 11.5–14.5)
EST. GFR  (NO RACE VARIABLE): >60 ML/MIN/1.73 M^2
GLUCOSE SERPL-MCNC: 161 MG/DL (ref 70–110)
HCT VFR BLD AUTO: 36.8 % (ref 37–48.5)
HGB BLD-MCNC: 12.3 G/DL (ref 12–16)
IMM GRANULOCYTES # BLD AUTO: 0.01 K/UL (ref 0–0.04)
IMM GRANULOCYTES NFR BLD AUTO: 0.3 % (ref 0–0.5)
LYMPHOCYTES # BLD AUTO: 1.6 K/UL (ref 1–4.8)
LYMPHOCYTES NFR BLD: 42.6 % (ref 18–48)
MCH RBC QN AUTO: 31.1 PG (ref 27–31)
MCHC RBC AUTO-ENTMCNC: 33.4 G/DL (ref 32–36)
MCV RBC AUTO: 93 FL (ref 82–98)
MONOCYTES # BLD AUTO: 0.2 K/UL (ref 0.3–1)
MONOCYTES NFR BLD: 4.7 % (ref 4–15)
NEUTROPHILS # BLD AUTO: 1.9 K/UL (ref 1.8–7.7)
NEUTROPHILS NFR BLD: 48.3 % (ref 38–73)
NRBC BLD-RTO: 0 /100 WBC
PLATELET # BLD AUTO: 199 K/UL (ref 150–450)
PMV BLD AUTO: 9.3 FL (ref 9.2–12.9)
POTASSIUM SERPL-SCNC: 4.1 MMOL/L (ref 3.5–5.1)
PROT SERPL-MCNC: 6.6 G/DL (ref 6–8.4)
RBC # BLD AUTO: 3.95 M/UL (ref 4–5.4)
SODIUM SERPL-SCNC: 140 MMOL/L (ref 136–145)
WBC # BLD AUTO: 3.83 K/UL (ref 3.9–12.7)

## 2023-06-09 PROCEDURE — 36415 COLL VENOUS BLD VENIPUNCTURE: CPT | Mod: PN | Performed by: STUDENT IN AN ORGANIZED HEALTH CARE EDUCATION/TRAINING PROGRAM

## 2023-06-09 PROCEDURE — 99214 PR OFFICE/OUTPT VISIT, EST, LEVL IV, 30-39 MIN: ICD-10-PCS | Mod: S$PBB,,, | Performed by: NURSE PRACTITIONER

## 2023-06-09 PROCEDURE — 85025 COMPLETE CBC W/AUTO DIFF WBC: CPT | Mod: PN | Performed by: STUDENT IN AN ORGANIZED HEALTH CARE EDUCATION/TRAINING PROGRAM

## 2023-06-09 PROCEDURE — 99999 PR PBB SHADOW E&M-EST. PATIENT-LVL IV: CPT | Mod: PBBFAC,,, | Performed by: NURSE PRACTITIONER

## 2023-06-09 PROCEDURE — 99214 OFFICE O/P EST MOD 30 MIN: CPT | Mod: S$PBB,,, | Performed by: NURSE PRACTITIONER

## 2023-06-09 PROCEDURE — 99999 PR PBB SHADOW E&M-EST. PATIENT-LVL IV: ICD-10-PCS | Mod: PBBFAC,,, | Performed by: NURSE PRACTITIONER

## 2023-06-09 PROCEDURE — 80053 COMPREHEN METABOLIC PANEL: CPT | Mod: PN | Performed by: STUDENT IN AN ORGANIZED HEALTH CARE EDUCATION/TRAINING PROGRAM

## 2023-06-09 PROCEDURE — 99214 OFFICE O/P EST MOD 30 MIN: CPT | Mod: PBBFAC,PN | Performed by: NURSE PRACTITIONER

## 2023-06-09 RX ORDER — EPINEPHRINE 0.3 MG/.3ML
0.3 INJECTION SUBCUTANEOUS ONCE AS NEEDED
Status: CANCELLED | OUTPATIENT
Start: 2023-06-12

## 2023-06-09 RX ORDER — HEPARIN 100 UNIT/ML
500 SYRINGE INTRAVENOUS
Status: CANCELLED | OUTPATIENT
Start: 2023-06-12

## 2023-06-09 RX ORDER — SODIUM CHLORIDE 0.9 % (FLUSH) 0.9 %
10 SYRINGE (ML) INJECTION
Status: CANCELLED | OUTPATIENT
Start: 2023-06-12

## 2023-06-09 RX ORDER — DIPHENHYDRAMINE HYDROCHLORIDE 50 MG/ML
50 INJECTION INTRAMUSCULAR; INTRAVENOUS ONCE AS NEEDED
Status: CANCELLED | OUTPATIENT
Start: 2023-06-12

## 2023-06-09 RX ORDER — FAMOTIDINE 10 MG/ML
20 INJECTION INTRAVENOUS
Status: CANCELLED | OUTPATIENT
Start: 2023-06-12

## 2023-06-09 NOTE — PROGRESS NOTES
PATIENT: Margaret Rouse  MRN: [id number]  DATE: 6/9/2023    Diagnosis:   1. Invasive ductal carcinoma of left breast in female        Chief Complaint: Clearance for C4 of treatment    Subjective:   HPI: Ms. Rouse is a 72 y.o. female with invasive ductal carcinoma of the left breast who is here today with her  for follow up and consideration of C4D1 Taxol/Ogivri on 06/12/23.    She began treatment with Paclitaxel and Trastuzumab on 5/22/23.   Concerns for vaginal bleeding post 1st treatment may be due to stopping Estrogen/Progesterone - f/u with Dr. Valladares who performed an endometrial bx on 06/06.  Light spotting after Bx; no bleeding presently; Myalgias have lessened.  BP up; increased worry over bx & family stressors.  Was able to have BP come down after relaxing & rechecking BP in 15 min ([facility]).  Denies HA, CP, SOB, cough, abd pain, diarrhea, constipation, dysuria, swelling, new lumps or bumps, rashes. Dneies fevers, chills.     Oncology History:   2/23/23 Seen for a newly diagnosed stage IA IDC of the left breast. MRI Breasts: clumped non-mass enhancement with associated clip 3.1 x 1.2 x 2.9cm with sub-cm satellites inferiorly up to 6mm at 1.6cm inferior.  No abnormal SARAH. Biopsy positive for IDC/DCIS, ER; low positive, [place]: negative, no HER-2 since was insufficient invasive tumor for IDC and was run on the DCIS part.    5/11/23 ECHO with EF 65%  Oncology History   Invasive ductal carcinoma of left breast in female   2/27/2023 Initial Diagnosis    Invasive ductal carcinoma of left breast in female     2/27/2023 Cancer Staged    Staging form: Breast, AJCC 8th Edition  - Clinical stage from 2/27/2023: cT1mi, cN0, cM0, G3, ER+, NY-, HER2: Not Assessed     5/1/2023 Cancer Staged    Staging form: Breast, AJCC 8th Edition  - Pathologic stage from 5/1/2023: Stage IA (pT1a, pN0(sn), cM0, G2, ER-, NY-, HER2+)     5/22/2023 -  Chemotherapy    Treatment Summary   Plan Name: OP BREAST TRASTUZUMAB  PACLITAXEL WEEKLY  Treatment Goal: Curative  Status: Active  Start Date: 5/22/2023  End Date: 4/22/2024 (Planned)  Provider: Alexandra Hannah MD  Chemotherapy: PACLitaxeL (TAXOL) 80 mg/m2 = 144 mg in sodium chloride 0.9% 250 mL chemo infusion, 80 mg/m2 = 144 mg, Intravenous, Clinic/HOD 1 time, 3 of 12 cycles  Administration: 144 mg (5/22/2023), 144 mg (5/29/2023), 144 mg (6/5/2023)  trastuzumab-dkst (OGIVRI) 278 mg in sodium chloride 0.9% 298.2 mL chemo infusion, 4 mg/kg = 278 mg, Intravenous, Clinic/HOD 1 time, 3 of 25 cycles  Administration: 278 mg (5/22/2023), 139 mg (5/29/2023), 136 mg (6/5/2023)     Ductal carcinoma in situ (DCIS) of left breast   3/5/2023 Initial Diagnosis    Ductal carcinoma in situ (DCIS) of left breast     5/22/2023 -  Chemotherapy    Treatment Summary   Plan Name: OP BREAST TRASTUZUMAB PACLITAXEL WEEKLY  Treatment Goal: Curative  Status: Active  Start Date: 5/22/2023  End Date: 4/22/2024 (Planned)  Provider: Alexandra Hannah MD  Chemotherapy: PACLitaxeL (TAXOL) 80 mg/m2 = 144 mg in sodium chloride 0.9% 250 mL chemo infusion, 80 mg/m2 = 144 mg, Intravenous, Clinic/HOD 1 time, 3 of 12 cycles  Administration: 144 mg (5/22/2023), 144 mg (5/29/2023), 144 mg (6/5/2023)  trastuzumab-dkst (OGIVRI) 278 mg in sodium chloride 0.9% 298.2 mL chemo infusion, 4 mg/kg = 278 mg, Intravenous, Clinic/HOD 1 time, 3 of 25 cycles  Administration: 278 mg (5/22/2023), 139 mg (5/29/2023), 136 mg (6/5/2023)        Past Medical History:   Past Medical History:   Diagnosis Date    Abnormal results of liver function studies     Cancer     left breast cancer    Chest pain     Fatigue     H/O: pneumonia     History of chicken pox     Hyperlipidemia, mixed     Hypertension     Menopausal syndrome     Palpitations 12/15/2015    PONV (postoperative nausea and vomiting)     Thyroid disease        Past Surgical HIstory:   Past Surgical History:   Procedure Laterality Date    BREAST RECONSTRUCTION Left 4/20/2023    Procedure:  RECONSTRUCTION, BREAST;  Surgeon: Pipo Bro MD;  Location: [facility] OR;  Service: Plastics;  Laterality: Left;    CATARACT EXTRACTION W/ INTRAOCULAR LENS  IMPLANT, BILATERAL      COLONOSCOPY      COSMETIC SURGERY      DEBRIDEMENT AND CLOSURE OF WOUND OF FINGER Left 11/29/2018    Procedure: DEBRIDEMENT, WOUND, FINGER, WITH CLOSURE, VY FLap;  Surgeon: Frederic Barbour MD;  Location: [facility] OR;  Service: Plastics;  Laterality: Left;    INSERTION OF BREAST IMPLANT Left 4/20/2023    Procedure: INSERTION, BREAST IMPLANT;  Surgeon: Pipo Bro MD;  Location: [facility] OR;  Service: Plastics;  Laterality: Left;    KNEE SURGERY Right 2015    LEFT HEART CATHETERIZATION Left 11/18/2021    Procedure: CATHETERIZATION, HEART, LEFT;  Surgeon: Basim Castrejon MD;  Location: [facility] CATH;  Service: Cardiology;  Laterality: Left;    PLACEMENT OF ACELLULAR HUMAN DERMAL ALLOGRAFT Left 4/20/2023    Procedure: APPLICATION, ACELLULAR HUMAN DERMAL ALLOGRAFT;  Surgeon: Pipo Bro MD;  Location: [facility] OR;  Service: Plastics;  Laterality: Left;    SENTINEL LYMPH NODE BIOPSY Left 4/20/2023    Procedure: BIOPSY, LYMPH NODE, SENTINEL;  Surgeon: Amanda Mcclellan MD;  Location: [facility] OR;  Service: General;  Laterality: Left;    SIMPLE MASTECTOMY Left 4/20/2023    Procedure: MASTECTOMY, SIMPLE;  Surgeon: Amanda Mcclellan MD;  Location: [facility] OR;  Service: General;  Laterality: Left;    TONSILLECTOMY  1955       Family History:   Family History   Problem Relation Age of Onset    No Known Problems Mother     Heart disease Father     Heart attack Father         at age 64    Cancer Sister         folicular non-hodgkins lymphoma    Lymphoma Sister     Diabetes Maternal Grandmother        Social History:  reports that she has quit smoking. She has never used smokeless tobacco. She reports that she does not drink alcohol and does not use drugs.    Allergies:  Review of patient's allergies indicates:  No Known Allergies    Medications:  Current Outpatient  Medications   Medication Sig Dispense Refill    ARMOUR THYROID 30 mg Tab once daily.      cyanocobalamin, vitamin B-12, 3,000 mcg Cap Take by mouth once daily. Triple Blend with folate 680mcg      duke's soln (benadryl 30 mL, mylanta 30 mL, LIDOcaine 30 mL, nystatin 30 mL) 120mL Take 10 mLs by mouth 4 (four) times daily. 120 mL 0    fluconazole (DIFLUCAN) 150 MG Tab Take one tab by mouth, repeat dose in 4 days 2 tablet 0    fluticasone propionate (FLONASE) 50 mcg/actuation nasal spray 1 spray by Each Nostril route once daily.      KRILL OIL ORAL Take 2,000 mg by mouth once daily.      losartan (COZAAR) 25 MG tablet once daily.      magnesium 200 mg Tab Take 400 mg by mouth once daily.      metFORMIN (GLUCOPHAGE) 500 MG tablet Take 500 mg by mouth 2 (two) times daily.      mupirocin (BACTROBAN) 2 % ointment SMARTSIG:Both Nares      ondansetron (ZOFRAN-ODT) 4 MG TbDL Take 1 tablet (4 mg total) by mouth every 8 (eight) hours as needed (nausea). 12 tablet 0    promethazine (PHENERGAN) 25 MG tablet Take 1 tablet (25 mg total) by mouth every 6 (six) hours as needed for Nausea. 30 tablet 0    rosuvastatin (CRESTOR) 20 MG tablet Take 20 mg by mouth once daily.      traZODone (DESYREL) 50 MG tablet Take 1 tablet (50 mg total) by mouth nightly. 30 tablet 2    VITAMIN A ORAL Take 5,000 Int'l Units/L by mouth once daily.      vitamin K2 45 mcg Cap Take by mouth once daily.      zinc gluconate 50 mg tablet Take 25 mg by mouth once daily.      ZINC ORAL Take 1 tablet by mouth once daily. Zinc 22mg with Quercetin 800mg combined       No current facility-administered medications for this visit.     Facility-Administered Medications Ordered in Other Visits   Medication Dose Route Frequency Provider Last Rate Last Admin    0.9%  NaCl infusion   Intravenous Continuous Marissa Lei NP           Review of Systems   Constitutional:  Negative for appetite change and unexpected weight change.   HENT:  Negative for mouth sores.   "  Eyes:  Negative for visual disturbance.   Respiratory:  Negative for cough and shortness of breath.    Cardiovascular:  Negative for chest pain.   Gastrointestinal:  Negative for abdominal pain and diarrhea.   Genitourinary:  Negative for difficulty urinating, dysuria, flank pain, frequency, hematuria, vaginal bleeding and vaginal discharge.   Musculoskeletal:  Negative for back pain.   Skin:  Negative for rash.   Neurological:  Negative for headaches.   Hematological:  Negative for adenopathy.   Psychiatric/Behavioral:  The patient is nervous/anxious.      ECOG Performance Status: 0      Objective:      Vitals:   Vitals:    06/09/23 1033   BP: (!) 162/82   BP Location: Left arm   Patient Position: Sitting   BP Method: Medium (Manual)   Pulse: 89   Resp: 16   Temp: 97.1 °F (36.2 °C)   TempSrc: Temporal   SpO2: 98%   Weight: 68.4 kg (150 lb 12.7 oz)   Height: 5' 6" (1.676 m)         BMI: Body mass index is 24.34 kg/m².    Physical Exam  Vitals reviewed.   Constitutional:       General: She is not in acute distress.     Appearance: She is not diaphoretic.   HENT:      Head: Normocephalic and atraumatic.   Eyes:      General: No scleral icterus.  Cardiovascular:      Rate and Rhythm: Normal rate and regular rhythm.      Heart sounds: Normal heart sounds. No murmur heard.  Pulmonary:      Effort: Pulmonary effort is normal. No respiratory distress.   Abdominal:      General: Abdomen is flat. Bowel sounds are normal. There is no distension.      Palpations: Abdomen is soft.      Tenderness: There is no abdominal tenderness. There is no right CVA tenderness, left CVA tenderness or guarding.   Musculoskeletal:      Right lower leg: No edema.      Left lower leg: No edema.   Skin:     Coloration: Skin is not jaundiced.      Findings: No rash.   Neurological:      Mental Status: She is alert and oriented to person, place, and time.   Psychiatric:         Mood and Affect: Mood normal.         Behavior: Behavior normal. "       Laboratory Data:  Lab Results   Component Value Date    WBC 3.83 (L) 06/09/2023    HGB 12.3 06/09/2023    HCT 36.8 (L) 06/09/2023    MCV 93 06/09/2023     06/09/2023      ANC = 1.9    CMP  Sodium   Date Value Ref Range Status   06/09/2023 140 136 - 145 mmol/L Final     Potassium   Date Value Ref Range Status   06/09/2023 4.1 3.5 - 5.1 mmol/L Final     Chloride   Date Value Ref Range Status   06/09/2023 104 95 - 110 mmol/L Final     CO2   Date Value Ref Range Status   06/09/2023 23 23 - 29 mmol/L Final     Glucose   Date Value Ref Range Status   06/09/2023 161 (H) 70 - 110 mg/dL Final     BUN   Date Value Ref Range Status   06/09/2023 15 8 - 23 mg/dL Final     Creatinine   Date Value Ref Range Status   06/09/2023 0.8 0.5 - 1.4 mg/dL Final     Calcium   Date Value Ref Range Status   06/09/2023 9.2 8.7 - 10.5 mg/dL Final     Total Protein   Date Value Ref Range Status   06/09/2023 6.6 6.0 - 8.4 g/dL Final     Albumin   Date Value Ref Range Status   06/09/2023 3.9 3.5 - 5.2 g/dL Final     Total Bilirubin   Date Value Ref Range Status   06/09/2023 0.3 0.1 - 1.0 mg/dL Final     Comment:     For infants and newborns, interpretation of results should be based  on gestational age, weight and in agreement with clinical  observations.    Premature Infant recommended reference ranges:  Up to 24 hours.............<8.0 mg/dL  Up to 48 hours............<12.0 mg/dL  3-5 days..................<15.0 mg/dL  6-29 days.................<15.0 mg/dL       Alkaline Phosphatase   Date Value Ref Range Status   06/09/2023 41 (L) 55 - 135 U/L Final     AST ([place])   Date Value Ref Range Status   12/28/2015 41 (H) 14 - 36 U/L Final     AST   Date Value Ref Range Status   06/09/2023 17 10 - 40 U/L Final     ALT   Date Value Ref Range Status   06/09/2023 26 10 - 44 U/L Final     Anion Gap   Date Value Ref Range Status   06/09/2023 13 8 - 16 mmol/L Final     eGFR   Date Value Ref Range Status   06/09/2023 >60.0 >60 mL/min/1.73  m^2 Final     06/07/2023:  US Pelvis Transvag  Impression:     1. Abnormal endometrial thickening up to 10 mm.  Considering the patient's postmenopausal status, additional evaluation with endometrial biopsy should be considered to exclude endometrial hyperplasia and/or endometrial carcinoma.  2. Nonvisualization of the right ovary  This report was flagged in Epic as abnormal.     Assessment:       1. Invasive ductal carcinoma of left breast in female    2. Primary hypertension    3. Stress    4. Anxiety about health         Plan:   Invasive ductal carcinoma of the left breast  -no indication for neoadjuvant chemo, no indication for further imaging, no indication for oncotype especially with possible T1a or T1b IDC and age >70's   -ER: 5.4%+, [place]: negative, HER-2 non assess initially then HER-2  +after IDC  was found  -significant discussion about adjuvant chemo vs endocrine but given her low ER/AZ positivity, HER-2 therapy might be her only way for preventing this cancer from coming back  -Last DEXA scan  in 2016 showed osteopenia   -decision to move forward with ; began 5/22/23  -Proceed with C4D1  on 6/12/23  -Follow up with Dr. Hannah in one week with labs prior to appointment; scheduled for 06/16    UTI  Vaginal dryness/irritation  -Noted small amount of blood when using restroom 5/22; UA positive for bacteria, 3+ blood  -Due to recent vaginal yeast infection, also sent Rx for Diflucan ; repeat dose in 3-4 days   -Patient to use vaginal gels such as RepHresh PRN   -Discussed symptoms could be due to stopping hormone supplements recently, vaginal atrophy, lingering yeast infection, UTI  -Symptoms have resolved, finish abx as prescribed  -F/U with Dr. Valladares, GYN 06/06; pending endometrial bx  -Monitor     Stress/Anxiety & increased BP  -discussed measures to assist with relaxing & releasing stress  -compliant with BP medication  -will monitor for any consistent elevations    Assessment/Plan reviewed and approved  by Dr. Hannah.    35 minutes were spent in coordination of patient's care, record review and counseling.    Route Chart for Scheduling  Med Onc Route Chart for Scheduling    Treatment Plan Information   OP BREAST TRASTUZUMAB PACLITAXEL WEEKLY   Alexandra Hannah MD   Upcoming Treatment Dates - OP BREAST TRASTUZUMAB PACLITAXEL WEEKLY    6/12/2023       Pre-Medications       diphenhydrAMINE (BENADRYL) 50 mg in NS 50 mL IVPB       dexAMETHasone (DECADRON) 10 mg in sodium chloride 0.9% 50 mL IVPB       famotidine (PF) injection 20 mg       Chemotherapy       trastuzumab-dkst (OGIVRI) 136 mg in sodium chloride 0.9% 250 mL chemo infusion       PACLitaxeL (TAXOL) 80 mg/m2 = 144 mg in sodium chloride 0.9% 250 mL chemo infusion  6/19/2023       Pre-Medications       diphenhydrAMINE (BENADRYL) 50 mg in NS 50 mL IVPB       dexAMETHasone (DECADRON) 10 mg in sodium chloride 0.9% 50 mL IVPB       famotidine (PF) injection 20 mg       Chemotherapy       trastuzumab-dkst (OGIVRI) 136 mg in sodium chloride 0.9% 250 mL chemo infusion       PACLitaxeL (TAXOL) 80 mg/m2 = 144 mg in sodium chloride 0.9% 250 mL chemo infusion  6/26/2023       Pre-Medications       diphenhydrAMINE (BENADRYL) 50 mg in NS 50 mL IVPB       dexAMETHasone (DECADRON) 10 mg in sodium chloride 0.9% 50 mL IVPB       famotidine (PF) injection 20 mg       Chemotherapy       trastuzumab-dkst (OGIVRI) 136 mg in sodium chloride 0.9% 250 mL chemo infusion       PACLitaxeL (TAXOL) 80 mg/m2 = 144 mg in sodium chloride 0.9% 250 mL chemo infusion  7/3/2023       Pre-Medications       diphenhydrAMINE (BENADRYL) 50 mg in NS 50 mL IVPB       dexAMETHasone (DECADRON) 10 mg in sodium chloride 0.9% 50 mL IVPB       famotidine (PF) injection 20 mg       Chemotherapy       trastuzumab-dkst (OGIVRI) 136 mg in sodium chloride 0.9% 250 mL chemo infusion       PACLitaxeL (TAXOL) 80 mg/m2 = 144 mg in sodium chloride 0.9% 250 mL chemo infusion

## 2023-06-10 ENCOUNTER — PATIENT MESSAGE (OUTPATIENT)
Dept: ADMINISTRATIVE | Facility: OTHER | Age: 73
End: 2023-06-10
Payer: MEDICARE

## 2023-06-11 ENCOUNTER — PATIENT MESSAGE (OUTPATIENT)
Dept: ADMINISTRATIVE | Facility: OTHER | Age: 73
End: 2023-06-11
Payer: MEDICARE

## 2023-06-12 ENCOUNTER — PATIENT MESSAGE (OUTPATIENT)
Dept: ADMINISTRATIVE | Facility: OTHER | Age: 73
End: 2023-06-12
Payer: MEDICARE

## 2023-06-12 ENCOUNTER — INFUSION (OUTPATIENT)
Dept: INFUSION THERAPY | Facility: HOSPITAL | Age: 73
End: 2023-06-12
Attending: STUDENT IN AN ORGANIZED HEALTH CARE EDUCATION/TRAINING PROGRAM
Payer: MEDICARE

## 2023-06-12 ENCOUNTER — DOCUMENTATION ONLY (OUTPATIENT)
Dept: INFUSION THERAPY | Facility: HOSPITAL | Age: 73
End: 2023-06-12
Payer: MEDICARE

## 2023-06-12 VITALS
RESPIRATION RATE: 18 BRPM | DIASTOLIC BLOOD PRESSURE: 76 MMHG | TEMPERATURE: 98 F | SYSTOLIC BLOOD PRESSURE: 122 MMHG | HEART RATE: 90 BPM | HEIGHT: 66 IN | WEIGHT: 155 LBS | BODY MASS INDEX: 24.91 KG/M2

## 2023-06-12 DIAGNOSIS — D05.12 DUCTAL CARCINOMA IN SITU (DCIS) OF LEFT BREAST: Primary | ICD-10-CM

## 2023-06-12 DIAGNOSIS — C50.912 INVASIVE DUCTAL CARCINOMA OF LEFT BREAST IN FEMALE: ICD-10-CM

## 2023-06-12 PROCEDURE — 96367 TX/PROPH/DG ADDL SEQ IV INF: CPT | Mod: PN

## 2023-06-12 PROCEDURE — 96375 TX/PRO/DX INJ NEW DRUG ADDON: CPT | Mod: PN

## 2023-06-12 PROCEDURE — 96413 CHEMO IV INFUSION 1 HR: CPT | Mod: PN

## 2023-06-12 PROCEDURE — 63600175 PHARM REV CODE 636 W HCPCS: Mod: PN | Performed by: NURSE PRACTITIONER

## 2023-06-12 PROCEDURE — 25000003 PHARM REV CODE 250: Mod: PN | Performed by: NURSE PRACTITIONER

## 2023-06-12 PROCEDURE — A4216 STERILE WATER/SALINE, 10 ML: HCPCS | Mod: PN | Performed by: NURSE PRACTITIONER

## 2023-06-12 PROCEDURE — 96417 CHEMO IV INFUS EACH ADDL SEQ: CPT | Mod: PN

## 2023-06-12 RX ORDER — SODIUM CHLORIDE 0.9 % (FLUSH) 0.9 %
10 SYRINGE (ML) INJECTION
Status: DISCONTINUED | OUTPATIENT
Start: 2023-06-12 | End: 2023-06-12 | Stop reason: HOSPADM

## 2023-06-12 RX ORDER — FAMOTIDINE 10 MG/ML
20 INJECTION INTRAVENOUS
Status: COMPLETED | OUTPATIENT
Start: 2023-06-12 | End: 2023-06-12

## 2023-06-12 RX ORDER — DIPHENHYDRAMINE HYDROCHLORIDE 50 MG/ML
50 INJECTION INTRAMUSCULAR; INTRAVENOUS ONCE AS NEEDED
Status: DISCONTINUED | OUTPATIENT
Start: 2023-06-12 | End: 2023-06-12 | Stop reason: HOSPADM

## 2023-06-12 RX ORDER — EPINEPHRINE 0.3 MG/.3ML
0.3 INJECTION SUBCUTANEOUS ONCE AS NEEDED
Status: DISCONTINUED | OUTPATIENT
Start: 2023-06-12 | End: 2023-06-12 | Stop reason: HOSPADM

## 2023-06-12 RX ADMIN — SODIUM CHLORIDE: 9 INJECTION, SOLUTION INTRAVENOUS at 11:06

## 2023-06-12 RX ADMIN — DEXAMETHASONE SODIUM PHOSPHATE 10 MG: 4 INJECTION INTRA-ARTICULAR; INTRALESIONAL; INTRAMUSCULAR; INTRAVENOUS; SOFT TISSUE at 12:06

## 2023-06-12 RX ADMIN — Medication 10 ML: at 11:06

## 2023-06-12 RX ADMIN — TRASTUZUMAB 136 MG: 150 INJECTION, POWDER, LYOPHILIZED, FOR SOLUTION INTRAVENOUS at 12:06

## 2023-06-12 RX ADMIN — FAMOTIDINE 20 MG: 10 INJECTION INTRAVENOUS at 12:06

## 2023-06-12 RX ADMIN — PACLITAXEL 144 MG: 6 INJECTION, SOLUTION, CONCENTRATE INTRAVENOUS at 01:06

## 2023-06-12 RX ADMIN — DIPHENHYDRAMINE HYDROCHLORIDE 50 MG: 50 INJECTION, SOLUTION INTRAMUSCULAR; INTRAVENOUS at 01:06

## 2023-06-12 NOTE — PLAN OF CARE
Problem: Fatigue (Oncology Care)  Goal: Improved Activity Tolerance  Intervention: Promote Improved Energy  Flowsheets (Taken 6/12/2023 1213)  Fatigue Management:   activity schedule adjusted   frequent rest breaks encouraged   paced activity encouraged   fatigue-related activity identified  Sleep/Rest Enhancement:   relaxation techniques promoted   natural light exposure provided   regular sleep/rest pattern promoted  Activity Management:   Ambulated -L4   Ambulated to bathroom - L4   Ambulated in loaiza - L4   Ambulated in room - L4   Up in stretcher chair - L1   Up in chair - L3     Problem: Adult Inpatient Plan of Care  Goal: Patient-Specific Goal (Individualized)  Outcome: Ongoing, Progressing  Flowsheets (Taken 6/12/2023 1213)  Anxieties, Fears or Concerns: PIV access  Individualized Care Needs: recliner, warm blanket, pillow, dim lights, conversation,  at chairside

## 2023-06-12 NOTE — PLAN OF CARE
Problem: Adult Inpatient Plan of Care  Goal: Plan of Care Review  6/12/2023 1616 by Thania Holbrook, RN  Outcome: Ongoing, Progressing  Flowsheets (Taken 6/12/2023 1500)  Plan of Care Reviewed With:   patient   spouse   Pt tolerated her Ogivri and Taxol infusion well, NAD. No new c/o voiced. Pt given a schedule and reviewed, pt verbalized understanding. Pt ambulated out of the clinic without difficulty by her .

## 2023-06-12 NOTE — PROGRESS NOTES
Oncology Nutrition   Chemotherapy Infusion Visit    Nutrition Follow Up   RD met pt at chairside today. Pt denies any nutrition related concerns - no n/v/d, no appetite or taste changes. She states she hasn't lost any weight either. RD reinforced that is good to not lose weight during treatment. Reminded of RD role in her POC and to contact if any questions.     Wt Readings from Last 10 Encounters:   06/12/23 70.3 kg (154 lb 15.7 oz)   06/09/23 68.4 kg (150 lb 12.7 oz)   06/05/23 69.2 kg (152 lb 8.9 oz)   06/02/23 67.9 kg (149 lb 11.1 oz)   05/29/23 69.4 kg (153 lb)   05/26/23 69.4 kg (153 lb)   05/23/23 69.5 kg (153 lb 3.5 oz)   05/22/23 70.4 kg (155 lb 3.3 oz)   05/19/23 69.5 kg (153 lb 3.5 oz)   05/12/23 69.7 kg (153 lb 10.6 oz)       Will continue to monitor prn throughout treatment.     Annetta Matta, MICHELLEN, LDN, [facility]  06/12/2023  1:53 PM

## 2023-06-13 ENCOUNTER — PATIENT MESSAGE (OUTPATIENT)
Dept: ADMINISTRATIVE | Facility: OTHER | Age: 73
End: 2023-06-13
Payer: MEDICARE

## 2023-06-14 ENCOUNTER — PATIENT MESSAGE (OUTPATIENT)
Dept: ADMINISTRATIVE | Facility: OTHER | Age: 73
End: 2023-06-14
Payer: MEDICARE

## 2023-06-14 ENCOUNTER — PATIENT MESSAGE (OUTPATIENT)
Dept: HEMATOLOGY/ONCOLOGY | Facility: CLINIC | Age: 73
End: 2023-06-14
Payer: MEDICARE

## 2023-06-15 ENCOUNTER — PATIENT MESSAGE (OUTPATIENT)
Dept: HEMATOLOGY/ONCOLOGY | Facility: CLINIC | Age: 73
End: 2023-06-15
Payer: MEDICARE

## 2023-06-15 ENCOUNTER — PATIENT MESSAGE (OUTPATIENT)
Dept: ADMINISTRATIVE | Facility: OTHER | Age: 73
End: 2023-06-15
Payer: MEDICARE

## 2023-06-16 ENCOUNTER — LAB VISIT (OUTPATIENT)
Dept: LAB | Facility: HOSPITAL | Age: 73
End: 2023-06-16
Attending: STUDENT IN AN ORGANIZED HEALTH CARE EDUCATION/TRAINING PROGRAM
Payer: MEDICARE

## 2023-06-16 ENCOUNTER — PATIENT MESSAGE (OUTPATIENT)
Dept: ADMINISTRATIVE | Facility: OTHER | Age: 73
End: 2023-06-16
Payer: MEDICARE

## 2023-06-16 ENCOUNTER — OFFICE VISIT (OUTPATIENT)
Dept: HEMATOLOGY/ONCOLOGY | Facility: CLINIC | Age: 73
End: 2023-06-16
Payer: MEDICARE

## 2023-06-16 ENCOUNTER — CLINICAL SUPPORT (OUTPATIENT)
Dept: REHABILITATION | Facility: HOSPITAL | Age: 73
End: 2023-06-16
Payer: MEDICARE

## 2023-06-16 VITALS
OXYGEN SATURATION: 97 % | HEIGHT: 66 IN | TEMPERATURE: 98 F | HEART RATE: 98 BPM | RESPIRATION RATE: 16 BRPM | DIASTOLIC BLOOD PRESSURE: 82 MMHG | SYSTOLIC BLOOD PRESSURE: 140 MMHG | BODY MASS INDEX: 24.17 KG/M2 | WEIGHT: 150.38 LBS

## 2023-06-16 DIAGNOSIS — C50.912 INVASIVE DUCTAL CARCINOMA OF LEFT BREAST IN FEMALE: ICD-10-CM

## 2023-06-16 DIAGNOSIS — N93.9 VAGINAL BLEEDING: ICD-10-CM

## 2023-06-16 DIAGNOSIS — C50.912 INVASIVE DUCTAL CARCINOMA OF LEFT BREAST IN FEMALE: Primary | ICD-10-CM

## 2023-06-16 DIAGNOSIS — Z91.89 AT RISK FOR LYMPHEDEMA: ICD-10-CM

## 2023-06-16 DIAGNOSIS — I10 PRIMARY HYPERTENSION: ICD-10-CM

## 2023-06-16 DIAGNOSIS — R45.89 ANXIETY ABOUT HEALTH: ICD-10-CM

## 2023-06-16 LAB
ALBUMIN SERPL BCP-MCNC: 4 G/DL (ref 3.5–5.2)
ALP SERPL-CCNC: 39 U/L (ref 55–135)
ALT SERPL W/O P-5'-P-CCNC: 23 U/L (ref 10–44)
ANION GAP SERPL CALC-SCNC: 13 MMOL/L (ref 8–16)
AST SERPL-CCNC: 18 U/L (ref 10–40)
BASOPHILS # BLD AUTO: 0.03 K/UL (ref 0–0.2)
BASOPHILS NFR BLD: 0.7 % (ref 0–1.9)
BILIRUB SERPL-MCNC: 0.5 MG/DL (ref 0.1–1)
BUN SERPL-MCNC: 15 MG/DL (ref 8–23)
CALCIUM SERPL-MCNC: 9.5 MG/DL (ref 8.7–10.5)
CHLORIDE SERPL-SCNC: 105 MMOL/L (ref 95–110)
CO2 SERPL-SCNC: 22 MMOL/L (ref 23–29)
CREAT SERPL-MCNC: 0.8 MG/DL (ref 0.5–1.4)
DIFFERENTIAL METHOD: ABNORMAL
EOSINOPHIL # BLD AUTO: 0.1 K/UL (ref 0–0.5)
EOSINOPHIL NFR BLD: 2 % (ref 0–8)
ERYTHROCYTE [DISTWIDTH] IN BLOOD BY AUTOMATED COUNT: 12.4 % (ref 11.5–14.5)
EST. GFR  (NO RACE VARIABLE): >60 ML/MIN/1.73 M^2
GLUCOSE SERPL-MCNC: 120 MG/DL (ref 70–110)
HCT VFR BLD AUTO: 37.5 % (ref 37–48.5)
HGB BLD-MCNC: 12.5 G/DL (ref 12–16)
IMM GRANULOCYTES # BLD AUTO: 0.02 K/UL (ref 0–0.04)
IMM GRANULOCYTES NFR BLD AUTO: 0.4 % (ref 0–0.5)
LYMPHOCYTES # BLD AUTO: 1.5 K/UL (ref 1–4.8)
LYMPHOCYTES NFR BLD: 34 % (ref 18–48)
MCH RBC QN AUTO: 30.9 PG (ref 27–31)
MCHC RBC AUTO-ENTMCNC: 33.3 G/DL (ref 32–36)
MCV RBC AUTO: 93 FL (ref 82–98)
MONOCYTES # BLD AUTO: 0.2 K/UL (ref 0.3–1)
MONOCYTES NFR BLD: 4.6 % (ref 4–15)
NEUTROPHILS # BLD AUTO: 2.6 K/UL (ref 1.8–7.7)
NEUTROPHILS NFR BLD: 58.3 % (ref 38–73)
NRBC BLD-RTO: 0 /100 WBC
PLATELET # BLD AUTO: 201 K/UL (ref 150–450)
PMV BLD AUTO: 9.3 FL (ref 9.2–12.9)
POTASSIUM SERPL-SCNC: 4.1 MMOL/L (ref 3.5–5.1)
PROT SERPL-MCNC: 6.9 G/DL (ref 6–8.4)
RBC # BLD AUTO: 4.05 M/UL (ref 4–5.4)
SODIUM SERPL-SCNC: 140 MMOL/L (ref 136–145)
WBC # BLD AUTO: 4.53 K/UL (ref 3.9–12.7)

## 2023-06-16 PROCEDURE — 99215 OFFICE O/P EST HI 40 MIN: CPT | Mod: S$PBB,,, | Performed by: STUDENT IN AN ORGANIZED HEALTH CARE EDUCATION/TRAINING PROGRAM

## 2023-06-16 PROCEDURE — 97535 SELF CARE MNGMENT TRAINING: CPT | Mod: PN

## 2023-06-16 PROCEDURE — 99215 PR OFFICE/OUTPT VISIT, EST, LEVL V, 40-54 MIN: ICD-10-PCS | Mod: S$PBB,,, | Performed by: STUDENT IN AN ORGANIZED HEALTH CARE EDUCATION/TRAINING PROGRAM

## 2023-06-16 PROCEDURE — 80053 COMPREHEN METABOLIC PANEL: CPT | Mod: PN | Performed by: STUDENT IN AN ORGANIZED HEALTH CARE EDUCATION/TRAINING PROGRAM

## 2023-06-16 PROCEDURE — 36415 COLL VENOUS BLD VENIPUNCTURE: CPT | Mod: PN | Performed by: STUDENT IN AN ORGANIZED HEALTH CARE EDUCATION/TRAINING PROGRAM

## 2023-06-16 PROCEDURE — 85025 COMPLETE CBC W/AUTO DIFF WBC: CPT | Mod: PN | Performed by: STUDENT IN AN ORGANIZED HEALTH CARE EDUCATION/TRAINING PROGRAM

## 2023-06-16 PROCEDURE — 99215 OFFICE O/P EST HI 40 MIN: CPT | Mod: PBBFAC,PN | Performed by: STUDENT IN AN ORGANIZED HEALTH CARE EDUCATION/TRAINING PROGRAM

## 2023-06-16 PROCEDURE — 97164 PT RE-EVAL EST PLAN CARE: CPT | Mod: 59,PN

## 2023-06-16 PROCEDURE — 99999 PR PBB SHADOW E&M-EST. PATIENT-LVL V: CPT | Mod: PBBFAC,,, | Performed by: STUDENT IN AN ORGANIZED HEALTH CARE EDUCATION/TRAINING PROGRAM

## 2023-06-16 PROCEDURE — 99999 PR PBB SHADOW E&M-EST. PATIENT-LVL V: ICD-10-PCS | Mod: PBBFAC,,, | Performed by: STUDENT IN AN ORGANIZED HEALTH CARE EDUCATION/TRAINING PROGRAM

## 2023-06-16 RX ORDER — HEPARIN 100 UNIT/ML
500 SYRINGE INTRAVENOUS
Status: CANCELLED | OUTPATIENT
Start: 2023-06-19

## 2023-06-16 RX ORDER — EPINEPHRINE 0.3 MG/.3ML
0.3 INJECTION SUBCUTANEOUS ONCE AS NEEDED
Status: CANCELLED | OUTPATIENT
Start: 2023-06-19

## 2023-06-16 RX ORDER — FAMOTIDINE 10 MG/ML
20 INJECTION INTRAVENOUS
Status: CANCELLED | OUTPATIENT
Start: 2023-06-19

## 2023-06-16 RX ORDER — SODIUM CHLORIDE 0.9 % (FLUSH) 0.9 %
10 SYRINGE (ML) INJECTION
Status: CANCELLED | OUTPATIENT
Start: 2023-06-19

## 2023-06-16 RX ORDER — DIPHENHYDRAMINE HYDROCHLORIDE 50 MG/ML
50 INJECTION INTRAMUSCULAR; INTRAVENOUS ONCE AS NEEDED
Status: CANCELLED | OUTPATIENT
Start: 2023-06-19

## 2023-06-16 NOTE — PROGRESS NOTES
PATIENT: Margaret Rouse  MRN: [id number]  DATE: 6/18/2023    Diagnosis:   1. Invasive ductal carcinoma of left breast in female    2. Primary hypertension    3. Anxiety about health    4. At risk for lymphedema    5. Vaginal bleeding          Chief Complaint: Clearance for C5 of treatment    Subjective:   HPI: Ms. Rouse is a 72 y.o. female with invasive ductal carcinoma of the left breast who is here today with her  for follow up and consideration for Taxol/Ogivri     She began treatment with Paclitaxel and Trastuzumab on 5/22/23.   Concerns for vaginal bleeding post 1st treatment may be due to stopping Estrogen/Progesterone - f/u with Dr. Valladares who performed an endometrial bx on 06/06,results still pending   Light spotting after Bx; no bleeding presently; Myalgias have lessened.  No more bleeding for more 2 weeks,     Oncology History:   2/23/23 Seen for a newly diagnosed stage IA IDC of the left breast. MRI Breasts: clumped non-mass enhancement with associated clip 3.1 x 1.2 x 2.9cm with sub-cm satellites inferiorly up to 6mm at 1.6cm inferior.  No abnormal SARAH. Biopsy positive for IDC/DCIS, ER; low positive, NC: negative, no HER-2 since was insufficient invasive tumor for IDC and was run on the DCIS part.    5/11/23 ECHO with EF 65%  Oncology History   Invasive ductal carcinoma of left breast in female   2/27/2023 Initial Diagnosis    Invasive ductal carcinoma of left breast in female     2/27/2023 Cancer Staged    Staging form: Breast, AJCC 8th Edition  - Clinical stage from 2/27/2023: cT1mi, cN0, cM0, G3, ER+, NC-, HER2: Not Assessed     5/1/2023 Cancer Staged    Staging form: Breast, AJCC 8th Edition  - Pathologic stage from 5/1/2023: Stage IA (pT1a, pN0(sn), cM0, G2, ER-, NC-, HER2+)     5/22/2023 -  Chemotherapy    Treatment Summary   Plan Name: OP BREAST TRASTUZUMAB PACLITAXEL WEEKLY  Treatment Goal: Curative  Status: Active  Start Date: 5/22/2023  End Date: 4/22/2024 (Planned)  Provider:  Alexandra Hannah MD  Chemotherapy: PACLitaxeL (TAXOL) 80 mg/m2 = 144 mg in sodium chloride 0.9% 250 mL chemo infusion, 80 mg/m2 = 144 mg, Intravenous, Clinic/HOD 1 time, 4 of 12 cycles  Administration: 144 mg (5/22/2023), 144 mg (5/29/2023), 144 mg (6/5/2023), 144 mg (6/12/2023)  trastuzumab-dkst (OGIVRI) 278 mg in sodium chloride 0.9% 298.2 mL chemo infusion, 4 mg/kg = 278 mg, Intravenous, Clinic/HOD 1 time, 4 of 25 cycles  Administration: 278 mg (5/22/2023), 139 mg (5/29/2023), 136 mg (6/5/2023), 136 mg (6/12/2023)     Ductal carcinoma in situ (DCIS) of left breast   3/5/2023 Initial Diagnosis    Ductal carcinoma in situ (DCIS) of left breast     5/22/2023 -  Chemotherapy    Treatment Summary   Plan Name: OP BREAST TRASTUZUMAB PACLITAXEL WEEKLY  Treatment Goal: Curative  Status: Active  Start Date: 5/22/2023  End Date: 4/22/2024 (Planned)  Provider: Alexandra Hannah MD  Chemotherapy: PACLitaxeL (TAXOL) 80 mg/m2 = 144 mg in sodium chloride 0.9% 250 mL chemo infusion, 80 mg/m2 = 144 mg, Intravenous, Clinic/HOD 1 time, 4 of 12 cycles  Administration: 144 mg (5/22/2023), 144 mg (5/29/2023), 144 mg (6/5/2023), 144 mg (6/12/2023)  trastuzumab-dkst (OGIVRI) 278 mg in sodium chloride 0.9% 298.2 mL chemo infusion, 4 mg/kg = 278 mg, Intravenous, Clinic/HOD 1 time, 4 of 25 cycles  Administration: 278 mg (5/22/2023), 139 mg (5/29/2023), 136 mg (6/5/2023), 136 mg (6/12/2023)        Past Medical History:   Past Medical History:   Diagnosis Date    Abnormal results of liver function studies     Cancer     left breast cancer    Chest pain     Fatigue     H/O: pneumonia     History of chicken pox     Hyperlipidemia, mixed     Hypertension     Menopausal syndrome     Palpitations 12/15/2015    PONV (postoperative nausea and vomiting)     Thyroid disease        Past Surgical HIstory:   Past Surgical History:   Procedure Laterality Date    BREAST RECONSTRUCTION Left 4/20/2023    Procedure: RECONSTRUCTION, BREAST;  Surgeon: Pipo Bro MD;   Location: [facility] OR;  Service: Plastics;  Laterality: Left;    CATARACT EXTRACTION W/ INTRAOCULAR LENS  IMPLANT, BILATERAL      COLONOSCOPY      COSMETIC SURGERY      DEBRIDEMENT AND CLOSURE OF WOUND OF FINGER Left 11/29/2018    Procedure: DEBRIDEMENT, WOUND, FINGER, WITH CLOSURE, VY FLap;  Surgeon: Frederic Barbour MD;  Location: [facility] OR;  Service: Plastics;  Laterality: Left;    INSERTION OF BREAST IMPLANT Left 4/20/2023    Procedure: INSERTION, BREAST IMPLANT;  Surgeon: Pipo Bro MD;  Location: [facility] OR;  Service: Plastics;  Laterality: Left;    KNEE SURGERY Right 2015    LEFT HEART CATHETERIZATION Left 11/18/2021    Procedure: CATHETERIZATION, HEART, LEFT;  Surgeon: Basim Castrejon MD;  Location: [facility] CATH;  Service: Cardiology;  Laterality: Left;    PLACEMENT OF ACELLULAR HUMAN DERMAL ALLOGRAFT Left 4/20/2023    Procedure: APPLICATION, ACELLULAR HUMAN DERMAL ALLOGRAFT;  Surgeon: Pipo Bro MD;  Location: [facility] OR;  Service: Plastics;  Laterality: Left;    SENTINEL LYMPH NODE BIOPSY Left 4/20/2023    Procedure: BIOPSY, LYMPH NODE, SENTINEL;  Surgeon: Amanda Mcclellan MD;  Location: [facility] OR;  Service: General;  Laterality: Left;    SIMPLE MASTECTOMY Left 4/20/2023    Procedure: MASTECTOMY, SIMPLE;  Surgeon: Amanda Mcclellan MD;  Location: [facility] OR;  Service: General;  Laterality: Left;    TONSILLECTOMY  1955       Family History:   Family History   Problem Relation Age of Onset    No Known Problems Mother     Heart disease Father     Heart attack Father         at age 64    Cancer Sister         folicular non-hodgkins lymphoma    Lymphoma Sister     Diabetes Maternal Grandmother        Social History:  reports that she has quit smoking. She has never used smokeless tobacco. She reports that she does not drink alcohol and does not use drugs.    Allergies:  Review of patient's allergies indicates:  No Known Allergies    Medications:  Current Outpatient Medications   Medication Sig Dispense Refill     ARMOUR THYROID 30 mg Tab once daily.      cyanocobalamin, vitamin B-12, 3,000 mcg Cap Take by mouth once daily. Triple Blend with folate 680mcg      duke's soln (benadryl 30 mL, mylanta 30 mL, LIDOcaine 30 mL, nystatin 30 mL) 120mL Take 10 mLs by mouth 4 (four) times daily. 120 mL 0    fluconazole (DIFLUCAN) 150 MG Tab Take one tab by mouth, repeat dose in 4 days 2 tablet 0    fluticasone propionate (FLONASE) 50 mcg/actuation nasal spray 1 spray by Each Nostril route once daily.      KRILL OIL ORAL Take 2,000 mg by mouth once daily.      losartan (COZAAR) 25 MG tablet once daily.      magnesium 200 mg Tab Take 400 mg by mouth once daily.      metFORMIN (GLUCOPHAGE) 500 MG tablet Take 500 mg by mouth 2 (two) times daily.      mupirocin (BACTROBAN) 2 % ointment SMARTSIG:Both Nares      ondansetron (ZOFRAN-ODT) 4 MG TbDL Take 1 tablet (4 mg total) by mouth every 8 (eight) hours as needed (nausea). 12 tablet 0    promethazine (PHENERGAN) 25 MG tablet Take 1 tablet (25 mg total) by mouth every 6 (six) hours as needed for Nausea. 30 tablet 0    rosuvastatin (CRESTOR) 20 MG tablet Take 20 mg by mouth once daily.      traZODone (DESYREL) 50 MG tablet Take 1 tablet (50 mg total) by mouth nightly. 30 tablet 2    VITAMIN A ORAL Take 5,000 Int'l Units/L by mouth once daily.      vitamin K2 45 mcg Cap Take by mouth once daily.      zinc gluconate 50 mg tablet Take 25 mg by mouth once daily.       No current facility-administered medications for this visit.     Facility-Administered Medications Ordered in Other Visits   Medication Dose Route Frequency Provider Last Rate Last Admin    0.9%  NaCl infusion   Intravenous Continuous Marissa Lei NP           Review of Systems   Constitutional:  Negative for appetite change and unexpected weight change.   HENT:  Negative for mouth sores.    Eyes:  Negative for visual disturbance.   Respiratory:  Negative for cough and shortness of breath.    Cardiovascular:  Negative for chest  "pain.   Gastrointestinal:  Negative for abdominal pain and diarrhea.   Genitourinary:  Negative for difficulty urinating, dysuria, flank pain, frequency, hematuria, vaginal bleeding and vaginal discharge.   Musculoskeletal:  Negative for back pain.   Skin:  Negative for rash.   Neurological:  Negative for headaches.   Hematological:  Negative for adenopathy.   Psychiatric/Behavioral:  The patient is nervous/anxious.      ECOG Performance Status: 0      Objective:      Vitals:   Vitals:    06/16/23 1307   BP: (!) 140/82   BP Location: Right arm   Patient Position: Sitting   BP Method: Medium (Manual)   Pulse: 98   Resp: 16   Temp: 97.7 °F (36.5 °C)   TempSrc: Temporal   SpO2: 97%   Weight: 68.2 kg (150 lb 5.7 oz)   Height: 5' 6" (1.676 m)         BMI: Body mass index is 24.27 kg/m².    Physical Exam  Vitals reviewed.   Constitutional:       General: She is not in acute distress.     Appearance: She is not diaphoretic.   HENT:      Head: Normocephalic and atraumatic.   Eyes:      General: No scleral icterus.  Cardiovascular:      Rate and Rhythm: Normal rate and regular rhythm.      Heart sounds: Normal heart sounds. No murmur heard.  Pulmonary:      Effort: Pulmonary effort is normal. No respiratory distress.   Abdominal:      General: Abdomen is flat. Bowel sounds are normal. There is no distension.      Palpations: Abdomen is soft.      Tenderness: There is no abdominal tenderness. There is no right CVA tenderness, left CVA tenderness or guarding.   Musculoskeletal:      Right lower leg: No edema.      Left lower leg: No edema.   Skin:     Coloration: Skin is not jaundiced.      Findings: No rash.   Neurological:      Mental Status: She is alert and oriented to person, place, and time.   Psychiatric:         Mood and Affect: Mood normal.         Behavior: Behavior normal.       Laboratory Data:  Lab Results   Component Value Date    WBC 4.53 06/16/2023    HGB 12.5 06/16/2023    HCT 37.5 06/16/2023    MCV 93 " 06/16/2023     06/16/2023      ANC = 1.9    CMP  Sodium   Date Value Ref Range Status   06/16/2023 140 136 - 145 mmol/L Final     Potassium   Date Value Ref Range Status   06/16/2023 4.1 3.5 - 5.1 mmol/L Final     Chloride   Date Value Ref Range Status   06/16/2023 105 95 - 110 mmol/L Final     CO2   Date Value Ref Range Status   06/16/2023 22 (L) 23 - 29 mmol/L Final     Glucose   Date Value Ref Range Status   06/16/2023 120 (H) 70 - 110 mg/dL Final     BUN   Date Value Ref Range Status   06/16/2023 15 8 - 23 mg/dL Final     Creatinine   Date Value Ref Range Status   06/16/2023 0.8 0.5 - 1.4 mg/dL Final     Calcium   Date Value Ref Range Status   06/16/2023 9.5 8.7 - 10.5 mg/dL Final     Total Protein   Date Value Ref Range Status   06/16/2023 6.9 6.0 - 8.4 g/dL Final     Albumin   Date Value Ref Range Status   06/16/2023 4.0 3.5 - 5.2 g/dL Final     Total Bilirubin   Date Value Ref Range Status   06/16/2023 0.5 0.1 - 1.0 mg/dL Final     Comment:     For infants and newborns, interpretation of results should be based  on gestational age, weight and in agreement with clinical  observations.    Premature Infant recommended reference ranges:  Up to 24 hours.............<8.0 mg/dL  Up to 48 hours............<12.0 mg/dL  3-5 days..................<15.0 mg/dL  6-29 days.................<15.0 mg/dL       Alkaline Phosphatase   Date Value Ref Range Status   06/16/2023 39 (L) 55 - 135 U/L Final     AST ([place])   Date Value Ref Range Status   12/28/2015 41 (H) 14 - 36 U/L Final     AST   Date Value Ref Range Status   06/16/2023 18 10 - 40 U/L Final     ALT   Date Value Ref Range Status   06/16/2023 23 10 - 44 U/L Final     Anion Gap   Date Value Ref Range Status   06/16/2023 13 8 - 16 mmol/L Final     eGFR   Date Value Ref Range Status   06/16/2023 >60.0 >60 mL/min/1.73 m^2 Final     06/07/2023:  US Pelvis Transvag  Impression:     1. Abnormal endometrial thickening up to 10 mm.  Considering the patient's  postmenopausal status, additional evaluation with endometrial biopsy should be considered to exclude endometrial hyperplasia and/or endometrial carcinoma.  2. Nonvisualization of the right ovary  This report was flagged in Epic as abnormal.     Assessment:       1. Invasive ductal carcinoma of left breast in female    2. Primary hypertension    3. Anxiety about health    4. At risk for lymphedema    5. Vaginal bleeding           Plan:   Invasive ductal carcinoma of the left breast  -no indication for neoadjuvant chemo, no indication for further imaging, no indication for oncotype especially with possible T1a or T1b IDC and age >70's   -ER: 5.4%+, SC: negative, HER-2 non assess initially then HER-2  +after IDC  was found  -significant discussion about adjuvant chemo vs endocrine but given her low ER/SC positivity, HER-2 therapy might be her only way for preventing this cancer from coming back  -Last DEXA scan  in 2016 showed osteopenia   -decision to move forward with ; began 5/22/23  -Proceed with C5D1        UTI  Vaginal dryness/irritation  -Noted small amount of blood when using restroom 5/22; UA positive for bacteria, 3+ blood  -Due to recent vaginal yeast infection, also sent Rx for Diflucan ; repeat dose in 3-4 days   -Patient to use vaginal gels such as RepHresh PRN   -Discussed symptoms could be due to stopping hormone supplements recently, vaginal atrophy, lingering yeast infection, UTI  -Symptoms have resolved, finish abx as prescribed  -F/U with Dr. Valladares, GYN 06/06; pending endometrial bx  -Monitor     Stress/Anxiety & increased BP  -discussed measures to assist with relaxing & releasing stress  -compliant with BP medication  -will monitor for any consistent elevations       45 minutes were spent in coordination of patient's care, record review and counseling.    Route Chart for Scheduling  Med Onc Route Chart for Scheduling    Treatment Plan Information   OP BREAST TRASTUZUMAB PACLITAXEL WEEKLY    Alexandra Hannah MD   Upcoming Treatment Dates - OP BREAST TRASTUZUMAB PACLITAXEL WEEKLY    6/19/2023       Pre-Medications       diphenhydrAMINE (BENADRYL) 50 mg in NS 50 mL IVPB       dexAMETHasone (DECADRON) 10 mg in sodium chloride 0.9% 50 mL IVPB       famotidine (PF) injection 20 mg       Chemotherapy       trastuzumab-dkst (OGIVRI) 136 mg in sodium chloride 0.9% 250 mL chemo infusion       PACLitaxeL (TAXOL) 80 mg/m2 = 144 mg in sodium chloride 0.9% 250 mL chemo infusion  6/26/2023       Pre-Medications       diphenhydrAMINE (BENADRYL) 50 mg in NS 50 mL IVPB       dexAMETHasone (DECADRON) 10 mg in sodium chloride 0.9% 50 mL IVPB       famotidine (PF) injection 20 mg       Chemotherapy       trastuzumab-dkst (OGIVRI) 136 mg in sodium chloride 0.9% 250 mL chemo infusion       PACLitaxeL (TAXOL) 80 mg/m2 = 144 mg in sodium chloride 0.9% 250 mL chemo infusion  7/3/2023       Pre-Medications       diphenhydrAMINE (BENADRYL) 50 mg in NS 50 mL IVPB       dexAMETHasone (DECADRON) 10 mg in sodium chloride 0.9% 50 mL IVPB       famotidine (PF) injection 20 mg       Chemotherapy       trastuzumab-dkst (OGIVRI) 136 mg in sodium chloride 0.9% 250 mL chemo infusion       PACLitaxeL (TAXOL) 80 mg/m2 = 144 mg in sodium chloride 0.9% 250 mL chemo infusion  7/10/2023       Pre-Medications       diphenhydrAMINE (BENADRYL) 50 mg in NS 50 mL IVPB       dexAMETHasone (DECADRON) 10 mg in sodium chloride 0.9% 50 mL IVPB       famotidine (PF) injection 20 mg       Chemotherapy       trastuzumab-dkst (OGIVRI) 136 mg in sodium chloride 0.9% 250 mL chemo infusion       PACLitaxeL (TAXOL) 80 mg/m2 = 144 mg in sodium chloride 0.9% 250 mL chemo infusion

## 2023-06-16 NOTE — PROGRESS NOTES
[facility]  Physical Therapy Post-Op Reassessment  Lymphedema Therapy    Visit Date: 6/16/2023     Name: Margaret Rouse  [facility] Number: [id number]  Therapy Diagnosis:   Encounter Diagnoses   Name Primary?    Invasive ductal carcinoma of left breast in female     At risk for lymphedema      Physician: Amanda Mcclellan MD  Physician Orders: PT Eval and Treat  Medical Diagnosis from Referral: Invasive ductal carcinoma of left breast  Chart review pertaining to cancer hx:   Invasive ductal carcinoma of left breast in female  Staging form: Breast, AJCC 8th Edition  - Clinical stage from 2/27/2023: cT1mi, cN0, cM0, G3, ER+, MT-, HER2: Not Assessed - Signed by Amanda Mcclellan MD on 2/27/2023   Left UOQ mid post 3.1cm area with subcm satellites  Nml LN, confirmed with rad  Grade 3 DCIS with microinv IDC  ER 5 nearly neg MT neg, unable to get Her2   Case reviewed at tumor conf. No benefit to rebiopsy, solid areas sub cm in size. LN appear neg. Conveyed to pt.    Discussed surgical options, discussed 3cm area with satellite areas.   She wishes to have left mastectomy, left SLNB, recon   Will meet with erica   Discussed NAC does not appear involved by imaging but does have considerable ptosis - options to be discussed with recon team   No signif disease on last cath - cardiac clearance   Has stopped HRT. Will not get additional testosterone pellets  Does not want right mastectomy at this time.     Evaluation Date: 03/15/2023  Reassessment: 06/16/2023  Authorization: med necessity  Plan of Care Expiration: PT f/u 6-8 weeks post-op  Reassessment Due: 05/15/2023     Visit: 2 / 2  PTA Visit: -- / 5  Time In: 02:05 PM  Time Out: 02:27 pm  Total Billable Time: 25 minutes    Precautions: Standard, Standard and cancer    Subjective     Pt reports: Had surgery 04/120/2023, went well, lymph nodes were clear. No complications, have returned to my regular activities.  Dx: Left  Mastectomy, L SLNB, reconstruction  Surgery date: 04/20/2023  Radiation: none needed  Chemotherapy: taxil and hercemtin now have had 4 so far.    Pain  Location: none  Current 0/10, Worst 0/10, Best 0/10   Description:    Past Medical History:   Past Medical History:   Diagnosis Date    Abnormal results of liver function studies     Cancer     left breast cancer    Chest pain     Fatigue     H/O: pneumonia     History of chicken pox     Hyperlipidemia, mixed     Hypertension     Menopausal syndrome     Palpitations 12/15/2015    PONV (postoperative nausea and vomiting)     Thyroid disease        Past Surgical History:  has a past surgical history that includes Tonsillectomy (1955); Knee surgery (Right, 2015); Cosmetic surgery; Debridement and closure of wound of finger (Left, 11/29/2018); Cataract extraction w/ intraocular lens  implant, bilateral; Colonoscopy; Left heart catheterization (Left, 11/18/2021); Simple mastectomy (Left, 4/20/2023); [place] lymph node biopsy (Left, 4/20/2023); Breast reconstruction (Left, 4/20/2023); Insertion of breast implant (Left, 4/20/2023); and Placement of acellular human dermal allograft (Left, 4/20/2023).    Medications: has a current medication list which includes the following prescription(s): armour thyroid, cyanocobalamin (vitamin b-12), nystatin, fluconazole, fluticasone propionate, krill oil, losartan, magnesium, metformin, mupirocin, ondansetron, promethazine, rosuvastatin, trazodone, vitamin a, vitamin k2, and zinc gluconate, and the following Facility-Administered Medications: sodium chloride 0.9%.    Allergies: Review of patient's allergies indicates:  No Known Allergies       Hand Dominance: Right  Diet: try to eat healthy, doesn't drink water like I should,   Habitus: well developed, well nourished    Prior Therapy/Previous treatment included: No PT this calendar year.   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, [employer]    Prior Exercise Routine: pt reports lives on large property, manages her garden  Prior Level of Function: independent  Current Level of Function: see above    Patient's Goals: none stated    Objective     Mental Status: Alert/Oriented    Observations  Posture: WFL  Joint Integrity: WFLs bilateral  Skin Integrity: intact bilateral  Edema: none bilateral    Sensation  Light Touch: intact bilateral  Proprioception: intact bilateral    A/PROM  (L) UE: WFLs  (R) UE: WFLs  Limitations:  none    STRENGTH  (L) UE: WFLs  (R) UE: WFLs  Limitations:  none    Baseline Measurements of BUEs  LANDMARK LEFT UE (cm)  Eval 03 LEFT UE  06/16/2023 RIGHT UE (cm)   W +  16 inches 27.6  27.2 28.8   W + 12  inches 25.0 24.8 25.5   Elbow 24.5 23.8 24.3   W + 6 inches 23.0 23.0 23.9   W + 4 inches 19.0 18.4 19.0   Wrist 15.1 15.0 15.2   DPC 19.0 18.6 19.0   IP Thumb 6.0 6.0 6.2   Chest circumference NT   Axillary circumference NT    42 cm   Garments recommended:   Sigvaris Advanced arm sleeve 15-20mmHg, size S1  Sigvaris Gauntlet Size small  Pt to wear compression garment 6 weeks after sx.   Recommended to be worn up to 1 yr for prophylactic concerns according to APTA clinical guidelines published in Journal of Physical Therapy.    Functional Mobility   Bed mobility: independent   Roll to left: independent   Roll to right: independent   Supine to prone: independent   Scooting to edge of bed: independent   Supine to sit: independent   Sit to supine: independent   Transfers to bed: independent   Transfers to toilet: independent   Sit to stand: independent   Stand pivot: independent   Car transfers: independent     Gait Assessment  AD used: none  Assistance: independent  Distance: community distances  Endurance: WFL     Gait Pattern: WFL       Treatment/Education     Treatment Time In: 02:02 PM  Treatment Time Out: 02:27 PM  Total Treatment time separate from Evaluation: 15  minutes      Self-Care/Home Management to improve behavioral/activity modifications related to ADLs, compensatory training, safety procedures, and adaptive equipment for 15 minutes including:  Garments: PT recommend wearing garments for one year per guidelines for prophylactical assist to decrease risk for lymphedema  Skin care  Weight management  Sleep  Nutrition  Infection prevention  Recommendations for 150 minutes a week of moderate intensity aerobic exercise as well as light resistive exercise to reduce the recurrence of breast cancer.       Education: Instructed on general anatomy/physiology, lymphedema information (definitions, signs, symptoms, precautions), role of therapy in multi-disciplinary team, purpose of lymphedema physical therapy and the benefits/risks of treatment, risks of refusing treatment, POC, and goals for therapy were discussed with the pt.    Written Home Exercises Provided: yes.  Exercises were reviewed and Shasha was able to demonstrate them prior to the end of the session. Shasha demonstrated good  understanding of the education provided.     See EMR under Patient Instructions for exercises provided 3/15/2023.    Assessment     Margaret is a 72 y.o. female referred to outpatient physical therapy with a medical diagnosis of invasive ductal carcinoma left breast, at risk lymphedema with surgical procedure planned on 04/20/2023. Pt was seen today post-operatively to re-assess strength and ROM of BUEs, as well as re-assess baseline circumferential measurements of BUEs to aid in the early detection of lymphedema post-operatively. Pt does not exhibit any ROM impairments or changes in girth measures to indicate early signs of lymphedema. PT dicussed with patient benefits of moderate intensity aerobic exercises as well as light resistive exercise for reducing the risk of reoccurrence of breast cancer and reduce the side effects of chemotherapy.   Patient declines use of UE compression garments for  prophylatic measures at this time.        Plan of care discussed with patient: Yes  Pt's spiritual, cultural and educational needs considered and patient is agreeable to the plan of care and goals as stated below:     Anticipated barriers for therapy:  none    Medical Necessity is demonstrated by the following:  History  Co-morbidities and personal factors that may impact the plan of care Co-morbidities:   advanced age, history of cancer, and level of undertstanding of current condition    Personal Factors:   level of understanding of current condition and age     moderate   Examination  Body Structures and Functions, activity limitations and participation restrictions that may impact the plan of care Body Systems:    none    Activity limitations:   Mobility  no deficits    Self care  no deficits    Domestic Life  no deficits           low   Clinical Presentation evolving clinical presentation with changing clinical characteristics moderate   Decision Making/ Complexity Score: moderate       GOALS  Short Term Goals: 3 months  Pt to be seen for reassessment in 6-8 weeks after surgery. (MET)  Pt will demonstrate 100% knowledge of lymphedema precautions and signs of infection. (MET)  Pt to obtain compression garments for prophylactic concerns according to APTA clinical guidelines published in Journal of Physical Therapy. (Not met)    Long Term Goals: deferred    Plan     Discharge PT     Leena Woodard, PT , CLT

## 2023-06-17 ENCOUNTER — PATIENT MESSAGE (OUTPATIENT)
Dept: ADMINISTRATIVE | Facility: OTHER | Age: 73
End: 2023-06-17
Payer: MEDICARE

## 2023-06-18 ENCOUNTER — PATIENT MESSAGE (OUTPATIENT)
Dept: ADMINISTRATIVE | Facility: OTHER | Age: 73
End: 2023-06-18
Payer: MEDICARE

## 2023-06-18 PROBLEM — Z91.89 AT RISK FOR LYMPHEDEMA: Status: ACTIVE | Noted: 2023-06-18

## 2023-06-18 PROBLEM — N93.9 VAGINAL BLEEDING: Status: ACTIVE | Noted: 2023-06-18

## 2023-06-19 ENCOUNTER — PATIENT MESSAGE (OUTPATIENT)
Dept: ADMINISTRATIVE | Facility: OTHER | Age: 73
End: 2023-06-19
Payer: MEDICARE

## 2023-06-19 ENCOUNTER — INFUSION (OUTPATIENT)
Dept: INFUSION THERAPY | Facility: HOSPITAL | Age: 73
End: 2023-06-19
Attending: STUDENT IN AN ORGANIZED HEALTH CARE EDUCATION/TRAINING PROGRAM
Payer: MEDICARE

## 2023-06-19 VITALS
TEMPERATURE: 98 F | HEART RATE: 89 BPM | HEIGHT: 66 IN | WEIGHT: 153 LBS | SYSTOLIC BLOOD PRESSURE: 120 MMHG | RESPIRATION RATE: 18 BRPM | DIASTOLIC BLOOD PRESSURE: 65 MMHG | BODY MASS INDEX: 24.59 KG/M2

## 2023-06-19 DIAGNOSIS — C50.912 INVASIVE DUCTAL CARCINOMA OF LEFT BREAST IN FEMALE: ICD-10-CM

## 2023-06-19 DIAGNOSIS — D05.12 DUCTAL CARCINOMA IN SITU (DCIS) OF LEFT BREAST: Primary | ICD-10-CM

## 2023-06-19 PROCEDURE — 63600175 PHARM REV CODE 636 W HCPCS: Mod: PN | Performed by: STUDENT IN AN ORGANIZED HEALTH CARE EDUCATION/TRAINING PROGRAM

## 2023-06-19 PROCEDURE — 96417 CHEMO IV INFUS EACH ADDL SEQ: CPT | Mod: PN

## 2023-06-19 PROCEDURE — 96367 TX/PROPH/DG ADDL SEQ IV INF: CPT | Mod: PN

## 2023-06-19 PROCEDURE — A4216 STERILE WATER/SALINE, 10 ML: HCPCS | Mod: PN | Performed by: STUDENT IN AN ORGANIZED HEALTH CARE EDUCATION/TRAINING PROGRAM

## 2023-06-19 PROCEDURE — 96413 CHEMO IV INFUSION 1 HR: CPT | Mod: PN

## 2023-06-19 PROCEDURE — 25000003 PHARM REV CODE 250: Mod: PN | Performed by: STUDENT IN AN ORGANIZED HEALTH CARE EDUCATION/TRAINING PROGRAM

## 2023-06-19 PROCEDURE — 96375 TX/PRO/DX INJ NEW DRUG ADDON: CPT | Mod: PN

## 2023-06-19 RX ORDER — SODIUM CHLORIDE 0.9 % (FLUSH) 0.9 %
10 SYRINGE (ML) INJECTION
Status: DISCONTINUED | OUTPATIENT
Start: 2023-06-19 | End: 2023-06-19 | Stop reason: HOSPADM

## 2023-06-19 RX ORDER — EPINEPHRINE 0.3 MG/.3ML
0.3 INJECTION SUBCUTANEOUS ONCE AS NEEDED
Status: DISCONTINUED | OUTPATIENT
Start: 2023-06-19 | End: 2023-06-19 | Stop reason: HOSPADM

## 2023-06-19 RX ORDER — FAMOTIDINE 10 MG/ML
20 INJECTION INTRAVENOUS
Status: COMPLETED | OUTPATIENT
Start: 2023-06-19 | End: 2023-06-19

## 2023-06-19 RX ORDER — DIPHENHYDRAMINE HYDROCHLORIDE 50 MG/ML
50 INJECTION INTRAMUSCULAR; INTRAVENOUS ONCE AS NEEDED
Status: DISCONTINUED | OUTPATIENT
Start: 2023-06-19 | End: 2023-06-19 | Stop reason: HOSPADM

## 2023-06-19 RX ADMIN — DEXAMETHASONE SODIUM PHOSPHATE 10 MG: 4 INJECTION INTRA-ARTICULAR; INTRALESIONAL; INTRAMUSCULAR; INTRAVENOUS; SOFT TISSUE at 12:06

## 2023-06-19 RX ADMIN — Medication 10 ML: at 10:06

## 2023-06-19 RX ADMIN — PACLITAXEL 144 MG: 6 INJECTION, SOLUTION, CONCENTRATE INTRAVENOUS at 01:06

## 2023-06-19 RX ADMIN — SODIUM CHLORIDE: 9 INJECTION, SOLUTION INTRAVENOUS at 10:06

## 2023-06-19 RX ADMIN — FAMOTIDINE 20 MG: 10 INJECTION INTRAVENOUS at 12:06

## 2023-06-19 RX ADMIN — DIPHENHYDRAMINE HYDROCHLORIDE 50 MG: 50 INJECTION, SOLUTION INTRAMUSCULAR; INTRAVENOUS at 12:06

## 2023-06-19 RX ADMIN — TRASTUZUMAB 136 MG: 150 INJECTION, POWDER, LYOPHILIZED, FOR SOLUTION INTRAVENOUS at 11:06

## 2023-06-19 NOTE — PLAN OF CARE
Problem: Adult Inpatient Plan of Care  Goal: Plan of Care Review  6/19/2023 1642 by Thania Holbrook, RN  Outcome: Ongoing, Progressing  Flowsheets (Taken 6/19/2023 1420)  Plan of Care Reviewed With:   patient   spouse   Pt tolerated her Ogivri and Taxol infusions well, NAD. No new c/o voiced. Pt given a schedule and reviewed, pt verbalized understanding. Pt ambulated out of the clinic without difficulty accompanied by her .

## 2023-06-19 NOTE — PLAN OF CARE
Problem: Fatigue (Oncology Care)  Goal: Improved Activity Tolerance  Intervention: Promote Improved Energy  Flowsheets (Taken 6/19/2023 1056)  Fatigue Management:   activity schedule adjusted   frequent rest breaks encouraged   paced activity encouraged   fatigue-related activity identified  Sleep/Rest Enhancement:   relaxation techniques promoted   natural light exposure provided   regular sleep/rest pattern promoted  Activity Management:   Ambulated -L4   Ambulated in loaiza - L4   Up in stretcher chair - L1     Problem: Adult Inpatient Plan of Care  Goal: Patient-Specific Goal (Individualized)  Outcome: Ongoing, Progressing  Flowsheets (Taken 6/19/2023 1056)  Anxieties, Fears or Concerns: PIV access  Individualized Care Needs: recliner, warm blanket, pillow, dim lights, convesation,  at chairside

## 2023-06-20 ENCOUNTER — PATIENT MESSAGE (OUTPATIENT)
Dept: ADMINISTRATIVE | Facility: OTHER | Age: 73
End: 2023-06-20
Payer: MEDICARE

## 2023-06-21 ENCOUNTER — PATIENT MESSAGE (OUTPATIENT)
Dept: ADMINISTRATIVE | Facility: OTHER | Age: 73
End: 2023-06-21
Payer: MEDICARE

## 2023-06-22 ENCOUNTER — PATIENT MESSAGE (OUTPATIENT)
Dept: ADMINISTRATIVE | Facility: OTHER | Age: 73
End: 2023-06-22
Payer: MEDICARE

## 2023-06-23 ENCOUNTER — LAB VISIT (OUTPATIENT)
Dept: LAB | Facility: HOSPITAL | Age: 73
End: 2023-06-23
Attending: STUDENT IN AN ORGANIZED HEALTH CARE EDUCATION/TRAINING PROGRAM
Payer: MEDICARE

## 2023-06-23 ENCOUNTER — PATIENT MESSAGE (OUTPATIENT)
Dept: ADMINISTRATIVE | Facility: OTHER | Age: 73
End: 2023-06-23
Payer: MEDICARE

## 2023-06-23 ENCOUNTER — OFFICE VISIT (OUTPATIENT)
Dept: HEMATOLOGY/ONCOLOGY | Facility: CLINIC | Age: 73
End: 2023-06-23
Payer: MEDICARE

## 2023-06-23 VITALS
DIASTOLIC BLOOD PRESSURE: 86 MMHG | WEIGHT: 150.81 LBS | TEMPERATURE: 98 F | HEIGHT: 66 IN | SYSTOLIC BLOOD PRESSURE: 116 MMHG | RESPIRATION RATE: 16 BRPM | BODY MASS INDEX: 24.24 KG/M2 | OXYGEN SATURATION: 97 % | HEART RATE: 92 BPM

## 2023-06-23 DIAGNOSIS — C50.912 INVASIVE DUCTAL CARCINOMA OF LEFT BREAST IN FEMALE: Primary | ICD-10-CM

## 2023-06-23 DIAGNOSIS — C50.912 INVASIVE DUCTAL CARCINOMA OF LEFT BREAST IN FEMALE: ICD-10-CM

## 2023-06-23 DIAGNOSIS — N93.9 VAGINAL BLEEDING: ICD-10-CM

## 2023-06-23 DIAGNOSIS — Z79.899 IMMUNODEFICIENCY DUE TO CHEMOTHERAPY: ICD-10-CM

## 2023-06-23 DIAGNOSIS — D84.821 IMMUNODEFICIENCY DUE TO CHEMOTHERAPY: ICD-10-CM

## 2023-06-23 DIAGNOSIS — T45.1X5A IMMUNODEFICIENCY DUE TO CHEMOTHERAPY: ICD-10-CM

## 2023-06-23 LAB
ALBUMIN SERPL BCP-MCNC: 3.9 G/DL (ref 3.5–5.2)
ALP SERPL-CCNC: 41 U/L (ref 55–135)
ALT SERPL W/O P-5'-P-CCNC: 25 U/L (ref 10–44)
ANION GAP SERPL CALC-SCNC: 10 MMOL/L (ref 8–16)
AST SERPL-CCNC: 19 U/L (ref 10–40)
BASOPHILS # BLD AUTO: 0.03 K/UL (ref 0–0.2)
BASOPHILS NFR BLD: 0.6 % (ref 0–1.9)
BILIRUB SERPL-MCNC: 0.6 MG/DL (ref 0.1–1)
BUN SERPL-MCNC: 17 MG/DL (ref 8–23)
CALCIUM SERPL-MCNC: 9.1 MG/DL (ref 8.7–10.5)
CHLORIDE SERPL-SCNC: 105 MMOL/L (ref 95–110)
CO2 SERPL-SCNC: 24 MMOL/L (ref 23–29)
CREAT SERPL-MCNC: 0.8 MG/DL (ref 0.5–1.4)
DIFFERENTIAL METHOD: ABNORMAL
EOSINOPHIL # BLD AUTO: 0.1 K/UL (ref 0–0.5)
EOSINOPHIL NFR BLD: 2.1 % (ref 0–8)
ERYTHROCYTE [DISTWIDTH] IN BLOOD BY AUTOMATED COUNT: 12.7 % (ref 11.5–14.5)
EST. GFR  (NO RACE VARIABLE): >60 ML/MIN/1.73 M^2
GLUCOSE SERPL-MCNC: 98 MG/DL (ref 70–110)
HCT VFR BLD AUTO: 37 % (ref 37–48.5)
HGB BLD-MCNC: 12.5 G/DL (ref 12–16)
IMM GRANULOCYTES # BLD AUTO: 0.02 K/UL (ref 0–0.04)
IMM GRANULOCYTES NFR BLD AUTO: 0.4 % (ref 0–0.5)
LYMPHOCYTES # BLD AUTO: 1.5 K/UL (ref 1–4.8)
LYMPHOCYTES NFR BLD: 32.5 % (ref 18–48)
MCH RBC QN AUTO: 31.6 PG (ref 27–31)
MCHC RBC AUTO-ENTMCNC: 33.8 G/DL (ref 32–36)
MCV RBC AUTO: 93 FL (ref 82–98)
MONOCYTES # BLD AUTO: 0.3 K/UL (ref 0.3–1)
MONOCYTES NFR BLD: 5.4 % (ref 4–15)
NEUTROPHILS # BLD AUTO: 2.8 K/UL (ref 1.8–7.7)
NEUTROPHILS NFR BLD: 59 % (ref 38–73)
NRBC BLD-RTO: 0 /100 WBC
PLATELET # BLD AUTO: 209 K/UL (ref 150–450)
PMV BLD AUTO: 9.4 FL (ref 9.2–12.9)
POTASSIUM SERPL-SCNC: 4.2 MMOL/L (ref 3.5–5.1)
PROT SERPL-MCNC: 6.6 G/DL (ref 6–8.4)
RBC # BLD AUTO: 3.96 M/UL (ref 4–5.4)
SODIUM SERPL-SCNC: 139 MMOL/L (ref 136–145)
WBC # BLD AUTO: 4.67 K/UL (ref 3.9–12.7)

## 2023-06-23 PROCEDURE — 99214 OFFICE O/P EST MOD 30 MIN: CPT | Mod: S$PBB,,,

## 2023-06-23 PROCEDURE — 80053 COMPREHEN METABOLIC PANEL: CPT | Mod: PN | Performed by: STUDENT IN AN ORGANIZED HEALTH CARE EDUCATION/TRAINING PROGRAM

## 2023-06-23 PROCEDURE — 99214 OFFICE O/P EST MOD 30 MIN: CPT | Mod: PBBFAC,PN

## 2023-06-23 PROCEDURE — 99999 PR PBB SHADOW E&M-EST. PATIENT-LVL IV: CPT | Mod: PBBFAC,,,

## 2023-06-23 PROCEDURE — 85025 COMPLETE CBC W/AUTO DIFF WBC: CPT | Mod: PN | Performed by: STUDENT IN AN ORGANIZED HEALTH CARE EDUCATION/TRAINING PROGRAM

## 2023-06-23 PROCEDURE — 99214 PR OFFICE/OUTPT VISIT, EST, LEVL IV, 30-39 MIN: ICD-10-PCS | Mod: S$PBB,,,

## 2023-06-23 PROCEDURE — 99999 PR PBB SHADOW E&M-EST. PATIENT-LVL IV: ICD-10-PCS | Mod: PBBFAC,,,

## 2023-06-23 PROCEDURE — 36415 COLL VENOUS BLD VENIPUNCTURE: CPT | Mod: PN | Performed by: STUDENT IN AN ORGANIZED HEALTH CARE EDUCATION/TRAINING PROGRAM

## 2023-06-23 RX ORDER — EPINEPHRINE 0.3 MG/.3ML
0.3 INJECTION SUBCUTANEOUS ONCE AS NEEDED
Status: CANCELLED | OUTPATIENT
Start: 2023-06-26

## 2023-06-23 RX ORDER — SODIUM CHLORIDE 0.9 % (FLUSH) 0.9 %
10 SYRINGE (ML) INJECTION
Status: CANCELLED | OUTPATIENT
Start: 2023-06-26

## 2023-06-23 RX ORDER — DIPHENHYDRAMINE HYDROCHLORIDE 50 MG/ML
50 INJECTION INTRAMUSCULAR; INTRAVENOUS ONCE AS NEEDED
Status: CANCELLED | OUTPATIENT
Start: 2023-06-26

## 2023-06-23 RX ORDER — FAMOTIDINE 10 MG/ML
20 INJECTION INTRAVENOUS
Status: CANCELLED | OUTPATIENT
Start: 2023-06-26

## 2023-06-23 RX ORDER — HEPARIN 100 UNIT/ML
500 SYRINGE INTRAVENOUS
Status: CANCELLED | OUTPATIENT
Start: 2023-06-26

## 2023-06-23 NOTE — PROGRESS NOTES
"  PATIENT: Margaret Rouse  MRN: [id number]  DATE: 6/23/2023    Diagnosis:   1. Invasive ductal carcinoma of left breast in female    2. Vaginal bleeding    3. Immunodeficiency due to chemotherapy        Chief Complaint: Invasive ductal carcinoma of left breast in female    Subjective:   HPI: Ms. Rouse is a 72 y.o. female with invasive ductal carcinoma of the left breast who is here today for follow up and consideration of C6D1 Taxol/Ogivri on 6/26/23.    She began treatment with Paclitaxel and Trastuzumab on 5/22/23.   Vaginal bleeding has improved, mild spotting now. Seems to be occurring monthly.   Underwent endometrial biopsy 6/7/2023 shows "overall appearance of this endometrium is suggestive of reparative changes."   Mild joint pain in knees.   Denies HA, CP, SOB, cough, abd pain, diarrhea, constipation, dysuria, swelling, new lumps or bumps, rashes. Dneies fevers, chills.     Oncology History:   2/23/23 Seen for a newly diagnosed stage IA IDC of the left breast. MRI Breasts: clumped non-mass enhancement with associated clip 3.1 x 1.2 x 2.9cm with sub-cm satellites inferiorly up to 6mm at 1.6cm inferior.  No abnormal SARAH. Biopsy positive for IDC/DCIS, ER; low positive, CA: negative, no HER-2 since was insufficient invasive tumor for IDC and was run on the DCIS part.    5/11/23 ECHO with EF 65%  Oncology History   Invasive ductal carcinoma of left breast in female   2/27/2023 Initial Diagnosis    Invasive ductal carcinoma of left breast in female     2/27/2023 Cancer Staged    Staging form: Breast, AJCC 8th Edition  - Clinical stage from 2/27/2023: cT1mi, cN0, cM0, G3, ER+, CA-, HER2: Not Assessed     5/1/2023 Cancer Staged    Staging form: Breast, AJCC 8th Edition  - Pathologic stage from 5/1/2023: Stage IA (pT1a, pN0(sn), cM0, G2, ER-, CA-, HER2+)     5/22/2023 -  Chemotherapy    Treatment Summary   Plan Name: OP BREAST TRASTUZUMAB PACLITAXEL WEEKLY  Treatment Goal: Curative  Status: Active  Start Date: " 5/22/2023  End Date: 4/22/2024 (Planned)  Provider: Alexandra Hannah MD  Chemotherapy: PACLitaxeL (TAXOL) 80 mg/m2 = 144 mg in sodium chloride 0.9% 250 mL chemo infusion, 80 mg/m2 = 144 mg, Intravenous, Clinic/HOD 1 time, 5 of 12 cycles  Administration: 144 mg (5/22/2023), 144 mg (5/29/2023), 144 mg (6/5/2023), 144 mg (6/12/2023), 144 mg (6/19/2023)  trastuzumab-dkst (OGIVRI) 278 mg in sodium chloride 0.9% 298.2 mL chemo infusion, 4 mg/kg = 278 mg, Intravenous, Clinic/HOD 1 time, 5 of 25 cycles  Administration: 278 mg (5/22/2023), 139 mg (5/29/2023), 136 mg (6/5/2023), 136 mg (6/12/2023), 136 mg (6/19/2023)     Ductal carcinoma in situ (DCIS) of left breast   3/5/2023 Initial Diagnosis    Ductal carcinoma in situ (DCIS) of left breast     5/22/2023 -  Chemotherapy    Treatment Summary   Plan Name: OP BREAST TRASTUZUMAB PACLITAXEL WEEKLY  Treatment Goal: Curative  Status: Active  Start Date: 5/22/2023  End Date: 4/22/2024 (Planned)  Provider: Alexandra Hannah MD  Chemotherapy: PACLitaxeL (TAXOL) 80 mg/m2 = 144 mg in sodium chloride 0.9% 250 mL chemo infusion, 80 mg/m2 = 144 mg, Intravenous, Clinic/HOD 1 time, 5 of 12 cycles  Administration: 144 mg (5/22/2023), 144 mg (5/29/2023), 144 mg (6/5/2023), 144 mg (6/12/2023), 144 mg (6/19/2023)  trastuzumab-dkst (OGIVRI) 278 mg in sodium chloride 0.9% 298.2 mL chemo infusion, 4 mg/kg = 278 mg, Intravenous, Clinic/HOD 1 time, 5 of 25 cycles  Administration: 278 mg (5/22/2023), 139 mg (5/29/2023), 136 mg (6/5/2023), 136 mg (6/12/2023), 136 mg (6/19/2023)        Past Medical History:   Past Medical History:   Diagnosis Date    Abnormal results of liver function studies     Cancer     left breast cancer    Chest pain     Fatigue     H/O: pneumonia     History of chicken pox     Hyperlipidemia, mixed     Hypertension     Menopausal syndrome     Palpitations 12/15/2015    PONV (postoperative nausea and vomiting)     Thyroid disease        Past Surgical HIstory:   Past Surgical History:    Procedure Laterality Date    BREAST RECONSTRUCTION Left 4/20/2023    Procedure: RECONSTRUCTION, BREAST;  Surgeon: Pipo Bro MD;  Location: [facility] OR;  Service: Plastics;  Laterality: Left;    CATARACT EXTRACTION W/ INTRAOCULAR LENS  IMPLANT, BILATERAL      COLONOSCOPY      COSMETIC SURGERY      DEBRIDEMENT AND CLOSURE OF WOUND OF FINGER Left 11/29/2018    Procedure: DEBRIDEMENT, WOUND, FINGER, WITH CLOSURE, VY FLap;  Surgeon: Frederic Barbour MD;  Location: [facility] OR;  Service: Plastics;  Laterality: Left;    INSERTION OF BREAST IMPLANT Left 4/20/2023    Procedure: INSERTION, BREAST IMPLANT;  Surgeon: Pipo Bro MD;  Location: [facility] OR;  Service: Plastics;  Laterality: Left;    KNEE SURGERY Right 2015    LEFT HEART CATHETERIZATION Left 11/18/2021    Procedure: CATHETERIZATION, HEART, LEFT;  Surgeon: Basim Castrejon MD;  Location: [facility] CATH;  Service: Cardiology;  Laterality: Left;    PLACEMENT OF ACELLULAR HUMAN DERMAL ALLOGRAFT Left 4/20/2023    Procedure: APPLICATION, ACELLULAR HUMAN DERMAL ALLOGRAFT;  Surgeon: Pipo Bro MD;  Location: [facility] OR;  Service: Plastics;  Laterality: Left;    SENTINEL LYMPH NODE BIOPSY Left 4/20/2023    Procedure: BIOPSY, LYMPH NODE, SENTINEL;  Surgeon: Amanda Mcclellan MD;  Location: [facility] OR;  Service: General;  Laterality: Left;    SIMPLE MASTECTOMY Left 4/20/2023    Procedure: MASTECTOMY, SIMPLE;  Surgeon: Amanda Mcclellan MD;  Location: [facility] OR;  Service: General;  Laterality: Left;    TONSILLECTOMY  1955       Family History:   Family History   Problem Relation Age of Onset    No Known Problems Mother     Heart disease Father     Heart attack Father         at age 64    Cancer Sister         folicular non-hodgkins lymphoma    Lymphoma Sister     Diabetes Maternal Grandmother        Social History:  reports that she has quit smoking. She has never used smokeless tobacco. She reports that she does not drink alcohol and does not use drugs.    Allergies:  Review of  patient's allergies indicates:  No Known Allergies    Medications:  Current Outpatient Medications   Medication Sig Dispense Refill    ARMOUR THYROID 30 mg Tab once daily.      cyanocobalamin, vitamin B-12, 3,000 mcg Cap Take by mouth once daily. Triple Blend with folate 680mcg      duke's soln (benadryl 30 mL, mylanta 30 mL, LIDOcaine 30 mL, nystatin 30 mL) 120mL Take 10 mLs by mouth 4 (four) times daily. 120 mL 0    fluconazole (DIFLUCAN) 150 MG Tab Take one tab by mouth, repeat dose in 4 days 2 tablet 0    fluticasone propionate (FLONASE) 50 mcg/actuation nasal spray 1 spray by Each Nostril route once daily.      KRILL OIL ORAL Take 2,000 mg by mouth once daily.      losartan (COZAAR) 25 MG tablet once daily.      metFORMIN (GLUCOPHAGE) 500 MG tablet Take 500 mg by mouth 2 (two) times daily.      mupirocin (BACTROBAN) 2 % ointment SMARTSIG:Both Nares      ondansetron (ZOFRAN-ODT) 4 MG TbDL Take 1 tablet (4 mg total) by mouth every 8 (eight) hours as needed (nausea). 12 tablet 0    promethazine (PHENERGAN) 25 MG tablet Take 1 tablet (25 mg total) by mouth every 6 (six) hours as needed for Nausea. 30 tablet 0    rosuvastatin (CRESTOR) 20 MG tablet Take 20 mg by mouth once daily.      traZODone (DESYREL) 50 MG tablet Take 1 tablet (50 mg total) by mouth nightly. 30 tablet 2    VITAMIN A ORAL Take 5,000 Int'l Units/L by mouth once daily.      vitamin K2 45 mcg Cap Take by mouth once daily.      zinc gluconate 50 mg tablet Take 25 mg by mouth once daily.      magnesium 200 mg Tab Take 400 mg by mouth once daily.       No current facility-administered medications for this visit.     Facility-Administered Medications Ordered in Other Visits   Medication Dose Route Frequency Provider Last Rate Last Admin    0.9%  NaCl infusion   Intravenous Continuous Marissa Lei NP           Review of Systems   Constitutional:  Negative for appetite change and unexpected weight change.   HENT:  Negative for mouth sores.    Eyes:   "Negative for visual disturbance.   Respiratory:  Negative for cough and shortness of breath.    Cardiovascular:  Negative for chest pain.   Gastrointestinal:  Negative for abdominal pain and diarrhea.   Genitourinary:  Positive for vaginal bleeding. Negative for difficulty urinating, dysuria, flank pain, frequency, hematuria and vaginal discharge.   Musculoskeletal:  Negative for back pain.   Skin:  Negative for rash.   Neurological:  Negative for headaches.   Hematological:  Negative for adenopathy.   Psychiatric/Behavioral:  The patient is not nervous/anxious.      ECOG Performance Status:   ECOG SCORE    0 - Fully active-able to carry on all pre-disease performance without restriction         Objective:      Vitals:   Vitals:    06/23/23 1333   BP: 116/86   Pulse: 92   Resp: 16   Temp: 98 °F (36.7 °C)   TempSrc: Temporal   SpO2: 97%   Weight: 68.4 kg (150 lb 12.7 oz)   Height: 5' 6" (1.676 m)           BMI: Body mass index is 24.34 kg/m².    Physical Exam  Vitals reviewed.   Constitutional:       General: She is not in acute distress.     Appearance: She is not diaphoretic.   HENT:      Head: Normocephalic and atraumatic.   Eyes:      General: No scleral icterus.  Cardiovascular:      Rate and Rhythm: Normal rate and regular rhythm.      Heart sounds: Normal heart sounds. No murmur heard.  Pulmonary:      Effort: Pulmonary effort is normal. No respiratory distress.   Abdominal:      General: Abdomen is flat. Bowel sounds are normal. There is no distension.      Palpations: Abdomen is soft.      Tenderness: There is no abdominal tenderness. There is no right CVA tenderness, left CVA tenderness or guarding.   Musculoskeletal:      Right lower leg: No edema.      Left lower leg: No edema.   Lymphadenopathy:      Cervical: No cervical adenopathy.   Skin:     Coloration: Skin is not jaundiced.      Findings: No rash.   Neurological:      Mental Status: She is alert and oriented to person, place, and time. " "  Psychiatric:         Mood and Affect: Mood normal.         Behavior: Behavior normal.       Laboratory Data:  Lab Results   Component Value Date    WBC 4.67 06/23/2023    HGB 12.5 06/23/2023    HCT 37.0 06/23/2023    MCV 93 06/23/2023     06/23/2023        Assessment:       1. Invasive ductal carcinoma of left breast in female    2. Vaginal bleeding    3. Immunodeficiency due to chemotherapy         Plan:   Invasive ductal carcinoma of the left breast  -no indication for neoadjuvant chemo, no indication for further imaging, no indication for oncotype especially with possible T1a or T1b IDC and age >70's   -ER: 5.4%+, TX: negative, HER-2 non assess initially then HER-2  +after IDC  was found  -significant discussion about adjuvant chemo vs endocrine but given her low ER/TX positivity, HER-2 therapy might be her only way for preventing this cancer from coming back  -Last DEXA scan  in 2016 showed osteopenia   -decision to move forward with ; began 5/22/23  -Proceed with C6D1 TH on 6/26/23  -Follow up with Dr. Hannah in one week with labs prior to appointment    Vaginal bleeding  -Most likely due to hormonal changes  -Underwent endometrial biopsy 6/7/2023 shows "overall appearance of this endometrium is suggestive of reparative changes."   -Improving, continue to follow with GYN, Dr. Valladares     Immunodeficiency due to drug/chemotherapy  -Patient at risk for infection   -No current signs/symptoms of infection  -Continue to monitor closely while on therapy    Assessment/Plan reviewed and approved by Dr. Hannah.    30 minutes were spent in coordination of patient's care, record review and counseling.    Route Chart for Scheduling    Med Onc Chart Routing      Follow up with physician 1 week. Dr. Hannah as planned on 6/30/23, CBC and CMP prior   Follow up with [facility]    Infusion scheduling note   Proceed with C6D1 TH on 6/26/23   Injection scheduling note    Labs    Imaging    Pharmacy appointment    Other referrals    "         Treatment Plan Information   OP BREAST TRASTUZUMAB PACLITAXEL WEEKLY   Alexandra Hannah MD   Upcoming Treatment Dates - OP BREAST TRASTUZUMAB PACLITAXEL WEEKLY    6/26/2023       Pre-Medications       diphenhydrAMINE (BENADRYL) 50 mg in NS 50 mL IVPB       dexAMETHasone (DECADRON) 10 mg in sodium chloride 0.9% 50 mL IVPB       famotidine (PF) injection 20 mg       Chemotherapy       trastuzumab-dkst (OGIVRI) 136 mg in sodium chloride 0.9% 250 mL chemo infusion       PACLitaxeL (TAXOL) 80 mg/m2 = 144 mg in sodium chloride 0.9% 250 mL chemo infusion  7/3/2023       Pre-Medications       diphenhydrAMINE (BENADRYL) 50 mg in NS 50 mL IVPB       dexAMETHasone (DECADRON) 10 mg in sodium chloride 0.9% 50 mL IVPB       famotidine (PF) injection 20 mg       Chemotherapy       trastuzumab-dkst (OGIVRI) 136 mg in sodium chloride 0.9% 250 mL chemo infusion       PACLitaxeL (TAXOL) 80 mg/m2 = 144 mg in sodium chloride 0.9% 250 mL chemo infusion  7/10/2023       Pre-Medications       diphenhydrAMINE (BENADRYL) 50 mg in NS 50 mL IVPB       dexAMETHasone (DECADRON) 10 mg in sodium chloride 0.9% 50 mL IVPB       famotidine (PF) injection 20 mg       Chemotherapy       trastuzumab-dkst (OGIVRI) 136 mg in sodium chloride 0.9% 250 mL chemo infusion       PACLitaxeL (TAXOL) 80 mg/m2 = 144 mg in sodium chloride 0.9% 250 mL chemo infusion  7/17/2023       Pre-Medications       diphenhydrAMINE (BENADRYL) 50 mg in NS 50 mL IVPB       dexAMETHasone (DECADRON) 10 mg in sodium chloride 0.9% 50 mL IVPB       famotidine (PF) injection 20 mg       Chemotherapy       trastuzumab-dkst (OGIVRI) 136 mg in sodium chloride 0.9% 250 mL chemo infusion       PACLitaxeL (TAXOL) 80 mg/m2 = 144 mg in sodium chloride 0.9% 250 mL chemo infusion

## 2023-06-24 ENCOUNTER — PATIENT MESSAGE (OUTPATIENT)
Dept: HEMATOLOGY/ONCOLOGY | Facility: CLINIC | Age: 73
End: 2023-06-24
Payer: MEDICARE

## 2023-06-24 ENCOUNTER — PATIENT MESSAGE (OUTPATIENT)
Dept: ADMINISTRATIVE | Facility: OTHER | Age: 73
End: 2023-06-24
Payer: MEDICARE

## 2023-06-25 ENCOUNTER — PATIENT MESSAGE (OUTPATIENT)
Dept: ADMINISTRATIVE | Facility: OTHER | Age: 73
End: 2023-06-25
Payer: MEDICARE

## 2023-06-26 ENCOUNTER — INFUSION (OUTPATIENT)
Dept: INFUSION THERAPY | Facility: HOSPITAL | Age: 73
End: 2023-06-26
Attending: STUDENT IN AN ORGANIZED HEALTH CARE EDUCATION/TRAINING PROGRAM
Payer: MEDICARE

## 2023-06-26 ENCOUNTER — PATIENT MESSAGE (OUTPATIENT)
Dept: ADMINISTRATIVE | Facility: OTHER | Age: 73
End: 2023-06-26
Payer: MEDICARE

## 2023-06-26 VITALS
HEIGHT: 66 IN | TEMPERATURE: 98 F | DIASTOLIC BLOOD PRESSURE: 77 MMHG | BODY MASS INDEX: 24.48 KG/M2 | WEIGHT: 152.31 LBS | RESPIRATION RATE: 18 BRPM | SYSTOLIC BLOOD PRESSURE: 128 MMHG | HEART RATE: 82 BPM

## 2023-06-26 DIAGNOSIS — C50.912 INVASIVE DUCTAL CARCINOMA OF LEFT BREAST IN FEMALE: ICD-10-CM

## 2023-06-26 DIAGNOSIS — D05.12 DUCTAL CARCINOMA IN SITU (DCIS) OF LEFT BREAST: Primary | ICD-10-CM

## 2023-06-26 PROCEDURE — 96417 CHEMO IV INFUS EACH ADDL SEQ: CPT | Mod: PN

## 2023-06-26 PROCEDURE — 96375 TX/PRO/DX INJ NEW DRUG ADDON: CPT | Mod: PN

## 2023-06-26 PROCEDURE — 25000003 PHARM REV CODE 250: Mod: PN

## 2023-06-26 PROCEDURE — 96367 TX/PROPH/DG ADDL SEQ IV INF: CPT | Mod: PN

## 2023-06-26 PROCEDURE — 63600175 PHARM REV CODE 636 W HCPCS: Mod: PN

## 2023-06-26 PROCEDURE — A4216 STERILE WATER/SALINE, 10 ML: HCPCS | Mod: PN

## 2023-06-26 PROCEDURE — 96413 CHEMO IV INFUSION 1 HR: CPT | Mod: PN

## 2023-06-26 RX ORDER — DIPHENHYDRAMINE HYDROCHLORIDE 50 MG/ML
50 INJECTION INTRAMUSCULAR; INTRAVENOUS ONCE AS NEEDED
Status: DISCONTINUED | OUTPATIENT
Start: 2023-06-26 | End: 2023-06-26 | Stop reason: HOSPADM

## 2023-06-26 RX ORDER — EPINEPHRINE 0.3 MG/.3ML
0.3 INJECTION SUBCUTANEOUS ONCE AS NEEDED
Status: DISCONTINUED | OUTPATIENT
Start: 2023-06-26 | End: 2023-06-26 | Stop reason: HOSPADM

## 2023-06-26 RX ORDER — SODIUM CHLORIDE 0.9 % (FLUSH) 0.9 %
10 SYRINGE (ML) INJECTION
Status: DISCONTINUED | OUTPATIENT
Start: 2023-06-26 | End: 2023-06-26 | Stop reason: HOSPADM

## 2023-06-26 RX ORDER — FAMOTIDINE 10 MG/ML
20 INJECTION INTRAVENOUS
Status: COMPLETED | OUTPATIENT
Start: 2023-06-26 | End: 2023-06-26

## 2023-06-26 RX ADMIN — DIPHENHYDRAMINE HYDROCHLORIDE 50 MG: 50 INJECTION, SOLUTION INTRAMUSCULAR; INTRAVENOUS at 01:06

## 2023-06-26 RX ADMIN — PACLITAXEL 144 MG: 6 INJECTION, SOLUTION, CONCENTRATE INTRAVENOUS at 02:06

## 2023-06-26 RX ADMIN — FAMOTIDINE 20 MG: 10 INJECTION INTRAVENOUS at 12:06

## 2023-06-26 RX ADMIN — Medication 10 ML: at 11:06

## 2023-06-26 RX ADMIN — DEXAMETHASONE SODIUM PHOSPHATE 10 MG: 4 INJECTION INTRA-ARTICULAR; INTRALESIONAL; INTRAMUSCULAR; INTRAVENOUS; SOFT TISSUE at 12:06

## 2023-06-26 RX ADMIN — TRASTUZUMAB 136 MG: 150 INJECTION, POWDER, LYOPHILIZED, FOR SOLUTION INTRAVENOUS at 12:06

## 2023-06-26 RX ADMIN — SODIUM CHLORIDE: 9 INJECTION, SOLUTION INTRAVENOUS at 11:06

## 2023-06-26 NOTE — PLAN OF CARE
Problem: Fatigue (Oncology Care)  Goal: Improved Activity Tolerance  Intervention: Promote Improved Energy  Flowsheets (Taken 6/26/2023 1130)  Fatigue Management:   activity schedule adjusted   frequent rest breaks encouraged   paced activity encouraged   fatigue-related activity identified  Sleep/Rest Enhancement:   relaxation techniques promoted   natural light exposure provided   regular sleep/rest pattern promoted  Activity Management:   Ambulated -L4   Ambulated to bathroom - L4   Ambulated in loaiza - L4   Up in stretcher chair - L1     Problem: Adult Inpatient Plan of Care  Goal: Patient-Specific Goal (Individualized)  Outcome: Ongoing, Progressing  Flowsheets (Taken 6/26/2023 1130)  Anxieties, Fears or Concerns: PIV access  Individualized Care Needs: recliner, warm blanket, pillow, dim lights, tv, conversation,  at chairside     Problem: Adult Inpatient Plan of Care  Goal: Plan of Care Review  Outcome: Ongoing, Progressing  Flowsheets (Taken 6/26/2023 1510)  Plan of Care Reviewed With: patient   Pt tolerated her Ogivri/Taxol infusions well, NAD. No new c/o voiced. Pt given a schedule and reviewed, pt verbalized understanding. Pt ambulated out of the clinic without difficulty accompanied by her .

## 2023-06-28 ENCOUNTER — PATIENT MESSAGE (OUTPATIENT)
Dept: ADMINISTRATIVE | Facility: OTHER | Age: 73
End: 2023-06-28
Payer: MEDICARE

## 2023-06-28 ENCOUNTER — TELEPHONE (OUTPATIENT)
Dept: GYNECOLOGIC ONCOLOGY | Facility: CLINIC | Age: 73
End: 2023-06-28
Payer: MEDICARE

## 2023-06-28 NOTE — TELEPHONE ENCOUNTER
"New referral received from Dr Valladares for recent diagnosis of PMB. EMB negative but due to vascular proliferation and reparative changes, history of bleeding with chemotherapy GYN/ONC referral placed. Pt called and name and  verified. Explained my role as nurse navigator and direct number provided. Pt states "I already a have HEM/ONC provider. My EMB results were negative. I discovered that I am bleeding on my chemo days and I have been off of my hormone pellets. I meet with oncology in 2 day and will talk to them about the need to see GYN/ONC. I am not interested in making an appointment at this time." Pt updated that Dr Valladares would be informed and we could always arrange a new pt appointment in the future if she desires. Pt verbalized understanding.   "

## 2023-06-28 NOTE — TELEPHONE ENCOUNTER
Dr Valladares's office called and updated that referral was received and pt contacted. Pt declined scheduling and desires to talk to her HEM/ONC provider. Leeann updated on the declined referral and Leeann said she would update Dr Valladares and make note of it.

## 2023-06-29 ENCOUNTER — PATIENT MESSAGE (OUTPATIENT)
Dept: ADMINISTRATIVE | Facility: OTHER | Age: 73
End: 2023-06-29
Payer: MEDICARE

## 2023-06-30 ENCOUNTER — LAB VISIT (OUTPATIENT)
Dept: LAB | Facility: HOSPITAL | Age: 73
End: 2023-06-30
Attending: STUDENT IN AN ORGANIZED HEALTH CARE EDUCATION/TRAINING PROGRAM
Payer: MEDICARE

## 2023-06-30 ENCOUNTER — TELEPHONE (OUTPATIENT)
Dept: GYNECOLOGIC ONCOLOGY | Facility: CLINIC | Age: 73
End: 2023-06-30
Payer: MEDICARE

## 2023-06-30 ENCOUNTER — OFFICE VISIT (OUTPATIENT)
Dept: HEMATOLOGY/ONCOLOGY | Facility: CLINIC | Age: 73
End: 2023-06-30
Payer: MEDICARE

## 2023-06-30 ENCOUNTER — PATIENT MESSAGE (OUTPATIENT)
Dept: ADMINISTRATIVE | Facility: OTHER | Age: 73
End: 2023-06-30
Payer: MEDICARE

## 2023-06-30 VITALS
HEART RATE: 94 BPM | OXYGEN SATURATION: 96 % | RESPIRATION RATE: 16 BRPM | SYSTOLIC BLOOD PRESSURE: 140 MMHG | DIASTOLIC BLOOD PRESSURE: 82 MMHG | WEIGHT: 151.88 LBS | HEIGHT: 66 IN | TEMPERATURE: 97 F | BODY MASS INDEX: 24.41 KG/M2

## 2023-06-30 DIAGNOSIS — D84.821 IMMUNODEFICIENCY DUE TO CHEMOTHERAPY: ICD-10-CM

## 2023-06-30 DIAGNOSIS — D05.12 DUCTAL CARCINOMA IN SITU (DCIS) OF LEFT BREAST: ICD-10-CM

## 2023-06-30 DIAGNOSIS — I10 PRIMARY HYPERTENSION: ICD-10-CM

## 2023-06-30 DIAGNOSIS — T45.1X5A IMMUNODEFICIENCY DUE TO CHEMOTHERAPY: ICD-10-CM

## 2023-06-30 DIAGNOSIS — C50.912 INVASIVE DUCTAL CARCINOMA OF LEFT BREAST IN FEMALE: Primary | ICD-10-CM

## 2023-06-30 DIAGNOSIS — N93.9 VAGINAL BLEEDING: ICD-10-CM

## 2023-06-30 DIAGNOSIS — Z79.899 IMMUNODEFICIENCY DUE TO CHEMOTHERAPY: ICD-10-CM

## 2023-06-30 DIAGNOSIS — C50.912 INVASIVE DUCTAL CARCINOMA OF LEFT BREAST IN FEMALE: ICD-10-CM

## 2023-06-30 LAB
ALBUMIN SERPL BCP-MCNC: 3.9 G/DL (ref 3.5–5.2)
ALP SERPL-CCNC: 43 U/L (ref 55–135)
ALT SERPL W/O P-5'-P-CCNC: 24 U/L (ref 10–44)
ANION GAP SERPL CALC-SCNC: 12 MMOL/L (ref 8–16)
AST SERPL-CCNC: 16 U/L (ref 10–40)
BASOPHILS # BLD AUTO: 0.04 K/UL (ref 0–0.2)
BASOPHILS NFR BLD: 0.9 % (ref 0–1.9)
BILIRUB SERPL-MCNC: 0.5 MG/DL (ref 0.1–1)
BUN SERPL-MCNC: 22 MG/DL (ref 8–23)
CALCIUM SERPL-MCNC: 9.5 MG/DL (ref 8.7–10.5)
CHLORIDE SERPL-SCNC: 105 MMOL/L (ref 95–110)
CO2 SERPL-SCNC: 24 MMOL/L (ref 23–29)
CREAT SERPL-MCNC: 0.8 MG/DL (ref 0.5–1.4)
DIFFERENTIAL METHOD: ABNORMAL
EOSINOPHIL # BLD AUTO: 0.1 K/UL (ref 0–0.5)
EOSINOPHIL NFR BLD: 2.2 % (ref 0–8)
ERYTHROCYTE [DISTWIDTH] IN BLOOD BY AUTOMATED COUNT: 12.7 % (ref 11.5–14.5)
EST. GFR  (NO RACE VARIABLE): >60 ML/MIN/1.73 M^2
GLUCOSE SERPL-MCNC: 131 MG/DL (ref 70–110)
HCT VFR BLD AUTO: 35.7 % (ref 37–48.5)
HGB BLD-MCNC: 12.1 G/DL (ref 12–16)
IMM GRANULOCYTES # BLD AUTO: 0.02 K/UL (ref 0–0.04)
IMM GRANULOCYTES NFR BLD AUTO: 0.4 % (ref 0–0.5)
LYMPHOCYTES # BLD AUTO: 1.7 K/UL (ref 1–4.8)
LYMPHOCYTES NFR BLD: 36.4 % (ref 18–48)
MCH RBC QN AUTO: 31.6 PG (ref 27–31)
MCHC RBC AUTO-ENTMCNC: 33.9 G/DL (ref 32–36)
MCV RBC AUTO: 93 FL (ref 82–98)
MONOCYTES # BLD AUTO: 0.2 K/UL (ref 0.3–1)
MONOCYTES NFR BLD: 4.8 % (ref 4–15)
NEUTROPHILS # BLD AUTO: 2.6 K/UL (ref 1.8–7.7)
NEUTROPHILS NFR BLD: 55.3 % (ref 38–73)
NRBC BLD-RTO: 0 /100 WBC
PLATELET # BLD AUTO: 200 K/UL (ref 150–450)
PMV BLD AUTO: 9.4 FL (ref 9.2–12.9)
POTASSIUM SERPL-SCNC: 4.4 MMOL/L (ref 3.5–5.1)
PROT SERPL-MCNC: 6.7 G/DL (ref 6–8.4)
RBC # BLD AUTO: 3.83 M/UL (ref 4–5.4)
SODIUM SERPL-SCNC: 141 MMOL/L (ref 136–145)
WBC # BLD AUTO: 4.62 K/UL (ref 3.9–12.7)

## 2023-06-30 PROCEDURE — 80053 COMPREHEN METABOLIC PANEL: CPT | Mod: PN | Performed by: STUDENT IN AN ORGANIZED HEALTH CARE EDUCATION/TRAINING PROGRAM

## 2023-06-30 PROCEDURE — 99999 PR PBB SHADOW E&M-EST. PATIENT-LVL III: CPT | Mod: PBBFAC,,, | Performed by: STUDENT IN AN ORGANIZED HEALTH CARE EDUCATION/TRAINING PROGRAM

## 2023-06-30 PROCEDURE — 99213 OFFICE O/P EST LOW 20 MIN: CPT | Mod: PBBFAC,PN | Performed by: STUDENT IN AN ORGANIZED HEALTH CARE EDUCATION/TRAINING PROGRAM

## 2023-06-30 PROCEDURE — 36415 COLL VENOUS BLD VENIPUNCTURE: CPT | Mod: PN | Performed by: STUDENT IN AN ORGANIZED HEALTH CARE EDUCATION/TRAINING PROGRAM

## 2023-06-30 PROCEDURE — 99215 OFFICE O/P EST HI 40 MIN: CPT | Mod: S$PBB,,, | Performed by: STUDENT IN AN ORGANIZED HEALTH CARE EDUCATION/TRAINING PROGRAM

## 2023-06-30 PROCEDURE — 85025 COMPLETE CBC W/AUTO DIFF WBC: CPT | Mod: PN | Performed by: STUDENT IN AN ORGANIZED HEALTH CARE EDUCATION/TRAINING PROGRAM

## 2023-06-30 PROCEDURE — 99999 PR PBB SHADOW E&M-EST. PATIENT-LVL III: ICD-10-PCS | Mod: PBBFAC,,, | Performed by: STUDENT IN AN ORGANIZED HEALTH CARE EDUCATION/TRAINING PROGRAM

## 2023-06-30 PROCEDURE — 99215 PR OFFICE/OUTPT VISIT, EST, LEVL V, 40-54 MIN: ICD-10-PCS | Mod: S$PBB,,, | Performed by: STUDENT IN AN ORGANIZED HEALTH CARE EDUCATION/TRAINING PROGRAM

## 2023-06-30 RX ORDER — EPINEPHRINE 0.3 MG/.3ML
0.3 INJECTION SUBCUTANEOUS ONCE AS NEEDED
Status: CANCELLED | OUTPATIENT
Start: 2023-07-03

## 2023-06-30 RX ORDER — DIPHENHYDRAMINE HYDROCHLORIDE 50 MG/ML
50 INJECTION INTRAMUSCULAR; INTRAVENOUS ONCE AS NEEDED
Status: CANCELLED | OUTPATIENT
Start: 2023-07-03

## 2023-06-30 RX ORDER — FAMOTIDINE 10 MG/ML
20 INJECTION INTRAVENOUS
Status: CANCELLED | OUTPATIENT
Start: 2023-07-03

## 2023-06-30 RX ORDER — HEPARIN 100 UNIT/ML
500 SYRINGE INTRAVENOUS
Status: CANCELLED | OUTPATIENT
Start: 2023-07-03

## 2023-06-30 RX ORDER — SODIUM CHLORIDE 0.9 % (FLUSH) 0.9 %
10 SYRINGE (ML) INJECTION
Status: CANCELLED | OUTPATIENT
Start: 2023-07-03

## 2023-06-30 NOTE — PROGRESS NOTES
"  PATIENT: Margaret Rouse  MRN: [id number]  DATE: 7/5/2023    Diagnosis:   1. Invasive ductal carcinoma of left breast in female    2. Vaginal bleeding    3. Immunodeficiency due to chemotherapy    4. Primary hypertension    5. Ductal carcinoma in situ (DCIS) of left breast      Chief Complaint: Invasive ductal carcinoma of left breast in female (Follow up with labs and tx)    Subjective:   HPI: Ms. Rouse is a 72 y.o. female with invasive ductal carcinoma of the left breast who is here today for follow up and consideration of C7D1 Taxol/Ogivri on 6/26/23.    She began treatment with Paclitaxel and Trastuzumab on 5/22/23.     Vaginal bleeding has improved, mild spotting now. Seems to be occurring monthly.   Underwent endometrial biopsy 6/7/2023 shows "overall appearance of this endometrium is suggestive of reparative changes." Will schedule an james with Dr Cristhian Samuels IZQUIERDO, CP, SOB, cough, abd pain, diarrhea, constipation, dysuria, swelling, new lumps or bumps, rashes. Dneies fevers, chills.     Oncology History:   2/23/23 Seen for a newly diagnosed stage IA IDC of the left breast. MRI Breasts: clumped non-mass enhancement with associated clip 3.1 x 1.2 x 2.9cm with sub-cm satellites inferiorly up to 6mm at 1.6cm inferior.  No abnormal SARAH. Biopsy positive for IDC/DCIS, ER; low positive, NH: negative, no HER-2 since was insufficient invasive tumor for IDC and was run on the DCIS part.    5/11/23 ECHO with EF 65%  Oncology History   Invasive ductal carcinoma of left breast in female   2/27/2023 Initial Diagnosis    Invasive ductal carcinoma of left breast in female     2/27/2023 Cancer Staged    Staging form: Breast, AJCC 8th Edition  - Clinical stage from 2/27/2023: cT1mi, cN0, cM0, G3, ER+, NH-, HER2: Not Assessed     5/1/2023 Cancer Staged    Staging form: Breast, AJCC 8th Edition  - Pathologic stage from 5/1/2023: Stage IA (pT1a, pN0(sn), cM0, G2, ER-, NH-, HER2+)     5/22/2023 -  Chemotherapy    Treatment " Summary   Plan Name: OP BREAST TRASTUZUMAB PACLITAXEL WEEKLY  Treatment Goal: Curative  Status: Active  Start Date: 5/22/2023  End Date: 4/22/2024 (Planned)  Provider: Alexandra Hannah MD  Chemotherapy: PACLitaxeL (TAXOL) 80 mg/m2 = 144 mg in sodium chloride 0.9% 250 mL chemo infusion, 80 mg/m2 = 144 mg, Intravenous, Clinic/HOD 1 time, 7 of 12 cycles  Administration: 144 mg (5/22/2023), 144 mg (5/29/2023), 144 mg (6/5/2023), 144 mg (6/12/2023), 144 mg (6/19/2023), 144 mg (6/26/2023), 144 mg (7/3/2023)  trastuzumab-dkst (OGIVRI) 278 mg in sodium chloride 0.9% 298.2 mL chemo infusion, 4 mg/kg = 278 mg, Intravenous, Clinic/HOD 1 time, 7 of 25 cycles  Administration: 278 mg (5/22/2023), 139 mg (5/29/2023), 136 mg (6/5/2023), 136 mg (6/12/2023), 136 mg (6/19/2023), 136 mg (6/26/2023), 136 mg (7/3/2023)     Ductal carcinoma in situ (DCIS) of left breast   3/5/2023 Initial Diagnosis    Ductal carcinoma in situ (DCIS) of left breast     5/22/2023 -  Chemotherapy    Treatment Summary   Plan Name: OP BREAST TRASTUZUMAB PACLITAXEL WEEKLY  Treatment Goal: Curative  Status: Active  Start Date: 5/22/2023  End Date: 4/22/2024 (Planned)  Provider: Alexandra Hannah MD  Chemotherapy: PACLitaxeL (TAXOL) 80 mg/m2 = 144 mg in sodium chloride 0.9% 250 mL chemo infusion, 80 mg/m2 = 144 mg, Intravenous, Clinic/HOD 1 time, 7 of 12 cycles  Administration: 144 mg (5/22/2023), 144 mg (5/29/2023), 144 mg (6/5/2023), 144 mg (6/12/2023), 144 mg (6/19/2023), 144 mg (6/26/2023), 144 mg (7/3/2023)  trastuzumab-dkst (OGIVRI) 278 mg in sodium chloride 0.9% 298.2 mL chemo infusion, 4 mg/kg = 278 mg, Intravenous, [facility] 1 time, 7 of 25 cycles  Administration: 278 mg (5/22/2023), 139 mg (5/29/2023), 136 mg (6/5/2023), 136 mg (6/12/2023), 136 mg (6/19/2023), 136 mg (6/26/2023), 136 mg (7/3/2023)        Past Medical History:   Past Medical History:   Diagnosis Date    Abnormal results of liver function studies     Cancer     left breast cancer    Chest pain      Fatigue     H/O: pneumonia     History of chicken pox     Hyperlipidemia, mixed     Hypertension     Menopausal syndrome     Palpitations 12/15/2015    PONV (postoperative nausea and vomiting)     Thyroid disease        Past Surgical HIstory:   Past Surgical History:   Procedure Laterality Date    BREAST RECONSTRUCTION Left 4/20/2023    Procedure: RECONSTRUCTION, BREAST;  Surgeon: Pipo Bro MD;  Location: [facility] OR;  Service: Plastics;  Laterality: Left;    CATARACT EXTRACTION W/ INTRAOCULAR LENS  IMPLANT, BILATERAL      COLONOSCOPY      COSMETIC SURGERY      DEBRIDEMENT AND CLOSURE OF WOUND OF FINGER Left 11/29/2018    Procedure: DEBRIDEMENT, WOUND, FINGER, WITH CLOSURE, VY FLap;  Surgeon: Frederic Barbour MD;  Location: [facility] OR;  Service: Plastics;  Laterality: Left;    INSERTION OF BREAST IMPLANT Left 4/20/2023    Procedure: INSERTION, BREAST IMPLANT;  Surgeon: Pipo Bro MD;  Location: [facility] OR;  Service: Plastics;  Laterality: Left;    KNEE SURGERY Right 2015    LEFT HEART CATHETERIZATION Left 11/18/2021    Procedure: CATHETERIZATION, HEART, LEFT;  Surgeon: Basim Castrejon MD;  Location: [facility] CATH;  Service: Cardiology;  Laterality: Left;    PLACEMENT OF ACELLULAR HUMAN DERMAL ALLOGRAFT Left 4/20/2023    Procedure: APPLICATION, ACELLULAR HUMAN DERMAL ALLOGRAFT;  Surgeon: Pipo Bro MD;  Location: [facility] OR;  Service: Plastics;  Laterality: Left;    SENTINEL LYMPH NODE BIOPSY Left 4/20/2023    Procedure: BIOPSY, LYMPH NODE, SENTINEL;  Surgeon: Amanda Mcclellan MD;  Location: [facility] OR;  Service: General;  Laterality: Left;    SIMPLE MASTECTOMY Left 4/20/2023    Procedure: MASTECTOMY, SIMPLE;  Surgeon: Amanda Mcclellan MD;  Location: [facility] OR;  Service: General;  Laterality: Left;    TONSILLECTOMY  1955       Family History:   Family History   Problem Relation Age of Onset    No Known Problems Mother     Heart disease Father     Heart attack Father         at age 64    Cancer Sister         folicular  non-hodgkins lymphoma    Lymphoma Sister     Diabetes Maternal Grandmother        Social History:  reports that she has quit smoking. She has never used smokeless tobacco. She reports that she does not drink alcohol and does not use drugs.    Allergies:  Review of patient's allergies indicates:  No Known Allergies    Medications:  Current Outpatient Medications   Medication Sig Dispense Refill    ARMOUR THYROID 30 mg Tab once daily.      cyanocobalamin, vitamin B-12, 3,000 mcg Cap Take by mouth once daily. Triple Blend with folate 680mcg      duke's soln (benadryl 30 mL, mylanta 30 mL, LIDOcaine 30 mL, nystatin 30 mL) 120mL Take 10 mLs by mouth 4 (four) times daily. 120 mL 0    fluconazole (DIFLUCAN) 150 MG Tab Take one tab by mouth, repeat dose in 4 days 2 tablet 0    fluticasone propionate (FLONASE) 50 mcg/actuation nasal spray 1 spray by Each Nostril route once daily.      KRILL OIL ORAL Take 2,000 mg by mouth once daily.      losartan (COZAAR) 25 MG tablet once daily.      magnesium 200 mg Tab Take 400 mg by mouth once daily.      metFORMIN (GLUCOPHAGE) 500 MG tablet Take 500 mg by mouth 2 (two) times daily.      mupirocin (BACTROBAN) 2 % ointment SMARTSIG:Both Nares      ondansetron (ZOFRAN-ODT) 4 MG TbDL Take 1 tablet (4 mg total) by mouth every 8 (eight) hours as needed (nausea). 12 tablet 0    promethazine (PHENERGAN) 25 MG tablet Take 1 tablet (25 mg total) by mouth every 6 (six) hours as needed for Nausea. 30 tablet 0    rosuvastatin (CRESTOR) 20 MG tablet Take 20 mg by mouth once daily.      traZODone (DESYREL) 50 MG tablet Take 1 tablet (50 mg total) by mouth nightly. 30 tablet 2    VITAMIN A ORAL Take 5,000 Int'l Units/L by mouth once daily.      vitamin K2 45 mcg Cap Take by mouth once daily.      zinc gluconate 50 mg tablet Take 25 mg by mouth once daily.       No current facility-administered medications for this visit.     Facility-Administered Medications Ordered in Other Visits   Medication Dose  "Route Frequency Provider Last Rate Last Admin    0.9%  NaCl infusion   Intravenous Continuous Marissa Lei NP           Review of Systems   Constitutional:  Negative for appetite change and unexpected weight change.   HENT:  Negative for mouth sores.    Eyes:  Negative for visual disturbance.   Respiratory:  Negative for cough and shortness of breath.    Cardiovascular:  Negative for chest pain.   Gastrointestinal:  Negative for abdominal pain and diarrhea.   Genitourinary:  Positive for vaginal bleeding. Negative for difficulty urinating, dysuria, flank pain, frequency, hematuria and vaginal discharge.   Musculoskeletal:  Negative for back pain.   Skin:  Negative for rash.   Neurological:  Negative for headaches.   Hematological:  Negative for adenopathy.   Psychiatric/Behavioral:  The patient is not nervous/anxious.      ECOG Performance Status:   ECOG SCORE             Objective:      Vitals:   Vitals:    06/30/23 1437   BP: (!) 140/82   BP Location: Right arm   Patient Position: Sitting   BP Method: Medium (Manual)   Pulse: 94   Resp: 16   Temp: 97.1 °F (36.2 °C)   TempSrc: Temporal   SpO2: 96%   Weight: 68.9 kg (151 lb 14.4 oz)   Height: 5' 6" (1.676 m)           BMI: Body mass index is 24.52 kg/m².    Physical Exam  Vitals reviewed.   Constitutional:       General: She is not in acute distress.     Appearance: She is not diaphoretic.   HENT:      Head: Normocephalic and atraumatic.   Eyes:      General: No scleral icterus.  Cardiovascular:      Rate and Rhythm: Normal rate and regular rhythm.      Heart sounds: Normal heart sounds. No murmur heard.  Pulmonary:      Effort: Pulmonary effort is normal. No respiratory distress.   Abdominal:      General: Abdomen is flat. Bowel sounds are normal. There is no distension.      Palpations: Abdomen is soft.      Tenderness: There is no abdominal tenderness. There is no right CVA tenderness, left CVA tenderness or guarding.   Musculoskeletal:      Right lower " "leg: No edema.      Left lower leg: No edema.   Lymphadenopathy:      Cervical: No cervical adenopathy.   Skin:     Coloration: Skin is not jaundiced.      Findings: No rash.   Neurological:      Mental Status: She is alert and oriented to person, place, and time.   Psychiatric:         Mood and Affect: Mood normal.         Behavior: Behavior normal.       Laboratory Data:  Lab Results   Component Value Date    WBC 4.62 06/30/2023    HGB 12.1 06/30/2023    HCT 35.7 (L) 06/30/2023    MCV 93 06/30/2023     06/30/2023        Assessment:       1. Invasive ductal carcinoma of left breast in female    2. Vaginal bleeding    3. Immunodeficiency due to chemotherapy    4. Primary hypertension    5. Ductal carcinoma in situ (DCIS) of left breast           Plan:   Invasive ductal carcinoma of the left breast  -no indication for neoadjuvant chemo, no indication for further imaging, no indication for oncotype especially with possible T1a or T1b IDC and age >70's   -ER: 5.4%+, FL: negative, HER-2 non assess initially then HER-2  +after IDC  was found  -significant discussion about adjuvant chemo vs endocrine but given her low ER/FL positivity, HER-2 therapy might be her only way for preventing this cancer from coming back  -Last DEXA scan  in 2016 showed osteopenia   -decision to move forward with TH; began 5/22/23  -Proceed with C7D1 TH    Vaginal bleeding  -Most likely due to hormonal changes  -Underwent endometrial biopsy 6/7/2023 shows "overall appearance of this endometrium is suggestive of reparative changes."   -Improving, continue to follow with GYN, Dr. Valladares, would like an james with Dr Morrow      Immunodeficiency due to drug/chemotherapy  -Patient at risk for infection   -No current signs/symptoms of infection  -Continue to monitor closely while on therapy       42 minutes were spent in coordination of patient's care, record review and counseling.    Route Chart for Scheduling    Med Onc Chart Routing      Follow " up with physician . Echo prior 8/14/23 james, 9/1/23 with Dr Hernandez ( patient requesting seeing him) CBC/CMP   Follow up with JAMES    Infusion scheduling note    Injection scheduling note    Labs    Imaging    Pharmacy appointment    Other referrals            Treatment Plan Information   OP BREAST TRASTUZUMAB PACLITAXEL WEEKLY   Alexandra Hannah MD   Upcoming Treatment Dates - OP BREAST TRASTUZUMAB PACLITAXEL WEEKLY    7/10/2023       Pre-Medications       diphenhydrAMINE (BENADRYL) 50 mg in NS 50 mL IVPB       dexAMETHasone (DECADRON) 10 mg in sodium chloride 0.9% 50 mL IVPB       famotidine (PF) injection 20 mg       Chemotherapy       trastuzumab-dkst (OGIVRI) 136 mg in sodium chloride 0.9% 250 mL chemo infusion       PACLitaxeL (TAXOL) 80 mg/m2 = 144 mg in sodium chloride 0.9% 250 mL chemo infusion  7/17/2023       Pre-Medications       diphenhydrAMINE (BENADRYL) 50 mg in NS 50 mL IVPB       dexAMETHasone (DECADRON) 10 mg in sodium chloride 0.9% 50 mL IVPB       famotidine (PF) injection 20 mg       Chemotherapy       trastuzumab-dkst (OGIVRI) 136 mg in sodium chloride 0.9% 250 mL chemo infusion       PACLitaxeL (TAXOL) 80 mg/m2 = 144 mg in sodium chloride 0.9% 250 mL chemo infusion  7/24/2023       Pre-Medications       diphenhydrAMINE (BENADRYL) 50 mg in NS 50 mL IVPB       dexAMETHasone (DECADRON) 10 mg in sodium chloride 0.9% 50 mL IVPB       famotidine (PF) injection 20 mg       Chemotherapy       trastuzumab-dkst (OGIVRI) 136 mg in sodium chloride 0.9% 250 mL chemo infusion       PACLitaxeL (TAXOL) 80 mg/m2 = 144 mg in sodium chloride 0.9% 250 mL chemo infusion  7/31/2023       Pre-Medications       diphenhydrAMINE (BENADRYL) 50 mg in NS 50 mL IVPB       dexAMETHasone (DECADRON) 10 mg in sodium chloride 0.9% 50 mL IVPB       famotidine (PF) injection 20 mg       Chemotherapy       trastuzumab-dkst (OGIVRI) 136 mg in sodium chloride 0.9% 250 mL chemo infusion       PACLitaxeL (TAXOL) 80 mg/m2 = 144 mg in sodium  chloride 0.9% 250 mL chemo infusion    Alexandra Hannah MD  Hematology and Oncology  [facility]  [facility] [place] [facility]

## 2023-07-02 ENCOUNTER — PATIENT MESSAGE (OUTPATIENT)
Dept: ADMINISTRATIVE | Facility: OTHER | Age: 73
End: 2023-07-02
Payer: MEDICARE

## 2023-07-03 ENCOUNTER — INFUSION (OUTPATIENT)
Dept: INFUSION THERAPY | Facility: HOSPITAL | Age: 73
End: 2023-07-03
Attending: STUDENT IN AN ORGANIZED HEALTH CARE EDUCATION/TRAINING PROGRAM
Payer: MEDICARE

## 2023-07-03 ENCOUNTER — PATIENT MESSAGE (OUTPATIENT)
Dept: ADMINISTRATIVE | Facility: OTHER | Age: 73
End: 2023-07-03
Payer: MEDICARE

## 2023-07-03 ENCOUNTER — PATIENT MESSAGE (OUTPATIENT)
Dept: HEMATOLOGY/ONCOLOGY | Facility: CLINIC | Age: 73
End: 2023-07-03
Payer: MEDICARE

## 2023-07-03 VITALS
HEIGHT: 66 IN | WEIGHT: 152.56 LBS | DIASTOLIC BLOOD PRESSURE: 60 MMHG | RESPIRATION RATE: 16 BRPM | HEART RATE: 83 BPM | BODY MASS INDEX: 24.52 KG/M2 | SYSTOLIC BLOOD PRESSURE: 123 MMHG | TEMPERATURE: 99 F

## 2023-07-03 DIAGNOSIS — D05.12 DUCTAL CARCINOMA IN SITU (DCIS) OF LEFT BREAST: Primary | ICD-10-CM

## 2023-07-03 DIAGNOSIS — C50.912 INVASIVE DUCTAL CARCINOMA OF LEFT BREAST IN FEMALE: ICD-10-CM

## 2023-07-03 PROCEDURE — 96375 TX/PRO/DX INJ NEW DRUG ADDON: CPT | Mod: PN

## 2023-07-03 PROCEDURE — 96367 TX/PROPH/DG ADDL SEQ IV INF: CPT | Mod: PN

## 2023-07-03 PROCEDURE — 96413 CHEMO IV INFUSION 1 HR: CPT | Mod: PN

## 2023-07-03 PROCEDURE — 63600175 PHARM REV CODE 636 W HCPCS: Mod: PN | Performed by: STUDENT IN AN ORGANIZED HEALTH CARE EDUCATION/TRAINING PROGRAM

## 2023-07-03 PROCEDURE — 25000003 PHARM REV CODE 250: Mod: PN | Performed by: STUDENT IN AN ORGANIZED HEALTH CARE EDUCATION/TRAINING PROGRAM

## 2023-07-03 PROCEDURE — 96417 CHEMO IV INFUS EACH ADDL SEQ: CPT | Mod: PN

## 2023-07-03 RX ORDER — FAMOTIDINE 10 MG/ML
20 INJECTION INTRAVENOUS
Status: COMPLETED | OUTPATIENT
Start: 2023-07-03 | End: 2023-07-03

## 2023-07-03 RX ADMIN — DIPHENHYDRAMINE HYDROCHLORIDE 50 MG: 50 INJECTION, SOLUTION INTRAMUSCULAR; INTRAVENOUS at 01:07

## 2023-07-03 RX ADMIN — PACLITAXEL 144 MG: 6 INJECTION, SOLUTION, CONCENTRATE INTRAVENOUS at 01:07

## 2023-07-03 RX ADMIN — DEXAMETHASONE SODIUM PHOSPHATE 10 MG: 10 INJECTION, SOLUTION INTRAMUSCULAR; INTRAVENOUS at 12:07

## 2023-07-03 RX ADMIN — SODIUM CHLORIDE: 9 INJECTION, SOLUTION INTRAVENOUS at 11:07

## 2023-07-03 RX ADMIN — FAMOTIDINE 20 MG: 10 INJECTION INTRAVENOUS at 01:07

## 2023-07-03 RX ADMIN — TRASTUZUMAB 136 MG: 150 INJECTION, POWDER, LYOPHILIZED, FOR SOLUTION INTRAVENOUS at 12:07

## 2023-07-03 NOTE — PLAN OF CARE
Pt tolerated Ogivri/Taxol regimen well.  No s/s of infusion reaction noted.  Instructed to call MD with any problems

## 2023-07-04 ENCOUNTER — PATIENT MESSAGE (OUTPATIENT)
Dept: ADMINISTRATIVE | Facility: OTHER | Age: 73
End: 2023-07-04
Payer: MEDICARE

## 2023-07-06 ENCOUNTER — PATIENT MESSAGE (OUTPATIENT)
Dept: ADMINISTRATIVE | Facility: OTHER | Age: 73
End: 2023-07-06
Payer: MEDICARE

## 2023-07-06 ENCOUNTER — TELEPHONE (OUTPATIENT)
Dept: HEMATOLOGY/ONCOLOGY | Facility: CLINIC | Age: 73
End: 2023-07-06
Payer: MEDICARE

## 2023-07-06 DIAGNOSIS — C50.912 INVASIVE DUCTAL CARCINOMA OF LEFT BREAST IN FEMALE: ICD-10-CM

## 2023-07-06 DIAGNOSIS — R93.89 ENDOMETRIAL THICKENING ON ULTRASOUND: ICD-10-CM

## 2023-07-06 DIAGNOSIS — N93.9 VAGINAL BLEEDING: Primary | ICD-10-CM

## 2023-07-06 NOTE — NURSING
Per Dr. Hernandez, ok to schedule.  Spoke with pt.  Scheduled for 7/19/23 here at the cancer center.  Location and contact information reviewed.  Encouraged her to call with any questions or concerns.  She thanked me and verbalized understanding.

## 2023-07-06 NOTE — NURSING
Spoke with pt regarding appt with Dr. Hernandez.  She sent me her endometrial biopsy report from Dr. Valladares's office.  I will send to Dr. Hernandez for review.

## 2023-07-07 ENCOUNTER — LAB VISIT (OUTPATIENT)
Dept: LAB | Facility: HOSPITAL | Age: 73
End: 2023-07-07
Attending: STUDENT IN AN ORGANIZED HEALTH CARE EDUCATION/TRAINING PROGRAM
Payer: MEDICARE

## 2023-07-07 ENCOUNTER — OFFICE VISIT (OUTPATIENT)
Dept: HEMATOLOGY/ONCOLOGY | Facility: CLINIC | Age: 73
End: 2023-07-07
Payer: MEDICARE

## 2023-07-07 ENCOUNTER — DOCUMENTATION ONLY (OUTPATIENT)
Dept: HEMATOLOGY/ONCOLOGY | Facility: CLINIC | Age: 73
End: 2023-07-07

## 2023-07-07 ENCOUNTER — PATIENT MESSAGE (OUTPATIENT)
Dept: HEMATOLOGY/ONCOLOGY | Facility: CLINIC | Age: 73
End: 2023-07-07

## 2023-07-07 VITALS
WEIGHT: 153.88 LBS | TEMPERATURE: 97 F | BODY MASS INDEX: 24.73 KG/M2 | OXYGEN SATURATION: 96 % | SYSTOLIC BLOOD PRESSURE: 126 MMHG | RESPIRATION RATE: 16 BRPM | HEART RATE: 96 BPM | DIASTOLIC BLOOD PRESSURE: 66 MMHG | HEIGHT: 66 IN

## 2023-07-07 DIAGNOSIS — C50.912 INVASIVE DUCTAL CARCINOMA OF LEFT BREAST IN FEMALE: ICD-10-CM

## 2023-07-07 DIAGNOSIS — C50.912 INVASIVE DUCTAL CARCINOMA OF LEFT BREAST IN FEMALE: Primary | ICD-10-CM

## 2023-07-07 LAB
ALBUMIN SERPL BCP-MCNC: 3.5 G/DL (ref 3.5–5.2)
ALP SERPL-CCNC: 39 U/L (ref 55–135)
ALT SERPL W/O P-5'-P-CCNC: 28 U/L (ref 10–44)
ANION GAP SERPL CALC-SCNC: 7 MMOL/L (ref 8–16)
AST SERPL-CCNC: 21 U/L (ref 10–40)
BASOPHILS # BLD AUTO: 0.02 K/UL (ref 0–0.2)
BASOPHILS NFR BLD: 0.5 % (ref 0–1.9)
BILIRUB SERPL-MCNC: 0.4 MG/DL (ref 0.1–1)
BUN SERPL-MCNC: 21 MG/DL (ref 8–23)
CALCIUM SERPL-MCNC: 8.9 MG/DL (ref 8.7–10.5)
CHLORIDE SERPL-SCNC: 106 MMOL/L (ref 95–110)
CO2 SERPL-SCNC: 26 MMOL/L (ref 23–29)
CREAT SERPL-MCNC: 0.8 MG/DL (ref 0.5–1.4)
DIFFERENTIAL METHOD: ABNORMAL
EOSINOPHIL # BLD AUTO: 0.1 K/UL (ref 0–0.5)
EOSINOPHIL NFR BLD: 2.3 % (ref 0–8)
ERYTHROCYTE [DISTWIDTH] IN BLOOD BY AUTOMATED COUNT: 13.2 % (ref 11.5–14.5)
EST. GFR  (NO RACE VARIABLE): >60 ML/MIN/1.73 M^2
GLUCOSE SERPL-MCNC: 163 MG/DL (ref 70–110)
HCT VFR BLD AUTO: 33 % (ref 37–48.5)
HGB BLD-MCNC: 11 G/DL (ref 12–16)
IMM GRANULOCYTES # BLD AUTO: 0.02 K/UL (ref 0–0.04)
IMM GRANULOCYTES NFR BLD AUTO: 0.5 % (ref 0–0.5)
LYMPHOCYTES # BLD AUTO: 1.4 K/UL (ref 1–4.8)
LYMPHOCYTES NFR BLD: 32.1 % (ref 18–48)
MCH RBC QN AUTO: 31.7 PG (ref 27–31)
MCHC RBC AUTO-ENTMCNC: 33.3 G/DL (ref 32–36)
MCV RBC AUTO: 95 FL (ref 82–98)
MONOCYTES # BLD AUTO: 0.1 K/UL (ref 0.3–1)
MONOCYTES NFR BLD: 2.9 % (ref 4–15)
NEUTROPHILS # BLD AUTO: 2.7 K/UL (ref 1.8–7.7)
NEUTROPHILS NFR BLD: 61.7 % (ref 38–73)
NRBC BLD-RTO: 0 /100 WBC
PLATELET # BLD AUTO: 174 K/UL (ref 150–450)
PMV BLD AUTO: 9.3 FL (ref 9.2–12.9)
POTASSIUM SERPL-SCNC: 4 MMOL/L (ref 3.5–5.1)
PROT SERPL-MCNC: 5.8 G/DL (ref 6–8.4)
RBC # BLD AUTO: 3.47 M/UL (ref 4–5.4)
SODIUM SERPL-SCNC: 139 MMOL/L (ref 136–145)
WBC # BLD AUTO: 4.43 K/UL (ref 3.9–12.7)

## 2023-07-07 PROCEDURE — 99213 PR OFFICE/OUTPT VISIT, EST, LEVL III, 20-29 MIN: ICD-10-PCS | Mod: S$PBB,,, | Performed by: NURSE PRACTITIONER

## 2023-07-07 PROCEDURE — 80053 COMPREHEN METABOLIC PANEL: CPT | Mod: PN | Performed by: STUDENT IN AN ORGANIZED HEALTH CARE EDUCATION/TRAINING PROGRAM

## 2023-07-07 PROCEDURE — 99213 OFFICE O/P EST LOW 20 MIN: CPT | Mod: S$PBB,,, | Performed by: NURSE PRACTITIONER

## 2023-07-07 PROCEDURE — 36415 COLL VENOUS BLD VENIPUNCTURE: CPT | Mod: PN | Performed by: STUDENT IN AN ORGANIZED HEALTH CARE EDUCATION/TRAINING PROGRAM

## 2023-07-07 PROCEDURE — 99999 PR PBB SHADOW E&M-EST. PATIENT-LVL IV: ICD-10-PCS | Mod: PBBFAC,,, | Performed by: NURSE PRACTITIONER

## 2023-07-07 PROCEDURE — 99214 OFFICE O/P EST MOD 30 MIN: CPT | Mod: PBBFAC,PN | Performed by: NURSE PRACTITIONER

## 2023-07-07 PROCEDURE — 99999 PR PBB SHADOW E&M-EST. PATIENT-LVL IV: CPT | Mod: PBBFAC,,, | Performed by: NURSE PRACTITIONER

## 2023-07-07 PROCEDURE — 85025 COMPLETE CBC W/AUTO DIFF WBC: CPT | Mod: PN | Performed by: STUDENT IN AN ORGANIZED HEALTH CARE EDUCATION/TRAINING PROGRAM

## 2023-07-07 RX ORDER — FAMOTIDINE 10 MG/ML
20 INJECTION INTRAVENOUS
Status: CANCELLED | OUTPATIENT
Start: 2023-07-10

## 2023-07-07 RX ORDER — DIPHENHYDRAMINE HYDROCHLORIDE 50 MG/ML
50 INJECTION INTRAMUSCULAR; INTRAVENOUS ONCE AS NEEDED
Status: CANCELLED | OUTPATIENT
Start: 2023-07-10

## 2023-07-07 RX ORDER — SODIUM CHLORIDE 0.9 % (FLUSH) 0.9 %
10 SYRINGE (ML) INJECTION
Status: CANCELLED | OUTPATIENT
Start: 2023-07-10

## 2023-07-07 RX ORDER — HEPARIN 100 UNIT/ML
500 SYRINGE INTRAVENOUS
Status: CANCELLED | OUTPATIENT
Start: 2023-07-10

## 2023-07-07 RX ORDER — EPINEPHRINE 0.3 MG/.3ML
0.3 INJECTION SUBCUTANEOUS ONCE AS NEEDED
Status: CANCELLED | OUTPATIENT
Start: 2023-07-10

## 2023-07-07 NOTE — PROGRESS NOTES
Reviewed UA results with patient; symptomatic at present; pelvic pressure after chemo; chemo on Monday, 07/10 & C&S should be available then.  Will wait for culture & sensitivity before prescribing antibiotics; increase hydrating fluids.

## 2023-07-07 NOTE — PROGRESS NOTES
"  PATIENT: Margaret Rouse  MRN: [id number]  DATE: 7/7/2023    Diagnosis:   1. Invasive ductal carcinoma of left breast in female      Chief Complaint: Invasive ductal carcinoma of left breast in female (Follow up with labs)    Subjective:   HPI: Ms. Rouse is a 72 y.o. female with invasive ductal carcinoma of the left breast who is here today with her  for follow up and consideration of C8D1 Taxol/Ogivri on 07/10/23.    She began treatment with Paclitaxel and Trastuzumab on 5/22/23.     Underwent endometrial biopsy 6/7/2023 shows "overall appearance of this endometrium is suggestive of reparative changes." Appt with Dr. Hernandez on 07/19    Today:  07/07/23  Reports fatigue the last few days.  States that on D2-3 of chemo, she experiences pressure in her pubis area.  No dysuria & clears after a few days.  Requesting UA.  States that after steroid & Taxol, she has some vaginal bleeding.  Denies HA, CP, SOB, cough, abd pain, diarrhea, constipation, dysuria, swelling, new lumps or bumps, rashes. Dneies fevers, chills.     Oncology History:   2/23/23 Seen for a newly diagnosed stage IA IDC of the left breast. MRI Breasts: clumped non-mass enhancement with associated clip 3.1 x 1.2 x 2.9cm with sub-cm satellites inferiorly up to 6mm at 1.6cm inferior.  No abnormal SARAH. Biopsy positive for IDC/DCIS, ER; low positive, DC: negative, no HER-2 since was insufficient invasive tumor for IDC and was run on the DCIS part.    5/11/23 ECHO with EF 65%  Oncology History   Invasive ductal carcinoma of left breast in female   2/27/2023 Initial Diagnosis    Invasive ductal carcinoma of left breast in female     2/27/2023 Cancer Staged    Staging form: Breast, AJCC 8th Edition  - Clinical stage from 2/27/2023: cT1mi, cN0, cM0, G3, ER+, DC-, HER2: Not Assessed     5/1/2023 Cancer Staged    Staging form: Breast, AJCC 8th Edition  - Pathologic stage from 5/1/2023: Stage IA (pT1a, pN0(sn), cM0, G2, ER-, DC-, HER2+)     5/22/2023 -  " Chemotherapy    Treatment Summary   Plan Name: OP BREAST TRASTUZUMAB PACLITAXEL WEEKLY  Treatment Goal: Curative  Status: Active  Start Date: 5/22/2023  End Date: 4/22/2024 (Planned)  Provider: Alexandra Hannah MD  Chemotherapy: PACLitaxeL (TAXOL) 80 mg/m2 = 144 mg in sodium chloride 0.9% 250 mL chemo infusion, 80 mg/m2 = 144 mg, Intravenous, Clinic/H[facility] 1 time, 7 of 12 cycles  Administration: 144 mg (5/22/2023), 144 mg (5/29/2023), 144 mg (6/5/2023), 144 mg (6/12/2023), 144 mg (6/19/2023), 144 mg (6/26/2023), 144 mg (7/3/2023)  trastuzumab-dkst (OGIVRI) 278 mg in sodium chloride 0.9% 298.2 mL chemo infusion, 4 mg/kg = 278 mg, Intravenous, Clinic/HOD 1 time, 7 of 25 cycles  Administration: 278 mg (5/22/2023), 139 mg (5/29/2023), 136 mg (6/5/2023), 136 mg (6/12/2023), 136 mg (6/19/2023), 136 mg (6/26/2023), 136 mg (7/3/2023)     Ductal carcinoma in situ (DCIS) of left breast   3/5/2023 Initial Diagnosis    Ductal carcinoma in situ (DCIS) of left breast     5/22/2023 -  Chemotherapy    Treatment Summary   Plan Name: OP BREAST TRASTUZUMAB PACLITAXEL WEEKLY  Treatment Goal: Curative  Status: Active  Start Date: 5/22/2023  End Date: 4/22/2024 (Planned)  Provider: Alexandra Hannah MD  Chemotherapy: PACLitaxeL (TAXOL) 80 mg/m2 = 144 mg in sodium chloride 0.9% 250 mL chemo infusion, 80 mg/m2 = 144 mg, Intravenous, Clinic/HOD 1 time, 7 of 12 cycles  Administration: 144 mg (5/22/2023), 144 mg (5/29/2023), 144 mg (6/5/2023), 144 mg (6/12/2023), 144 mg (6/19/2023), 144 mg (6/26/2023), 144 mg (7/3/2023)  trastuzumab-dkst (OGIVRI) 278 mg in sodium chloride 0.9% 298.2 mL chemo infusion, 4 mg/kg = 278 mg, Intravenous, [facility] 1 time, 7 of 25 cycles  Administration: 278 mg (5/22/2023), 139 mg (5/29/2023), 136 mg (6/5/2023), 136 mg (6/12/2023), 136 mg (6/19/2023), 136 mg (6/26/2023), 136 mg (7/3/2023)        Past Medical History:   Past Medical History:   Diagnosis Date    Abnormal results of liver function studies     Cancer     left  breast cancer    Chest pain     Fatigue     H/O: pneumonia     History of chicken pox     Hyperlipidemia, mixed     Hypertension     Menopausal syndrome     Palpitations 12/15/2015    PONV (postoperative nausea and vomiting)     Thyroid disease        Past Surgical HIstory:   Past Surgical History:   Procedure Laterality Date    BREAST RECONSTRUCTION Left 4/20/2023    Procedure: RECONSTRUCTION, BREAST;  Surgeon: Pipo Bro MD;  Location: [facility] OR;  Service: Plastics;  Laterality: Left;    CATARACT EXTRACTION W/ INTRAOCULAR LENS  IMPLANT, BILATERAL      COLONOSCOPY      COSMETIC SURGERY      DEBRIDEMENT AND CLOSURE OF WOUND OF FINGER Left 11/29/2018    Procedure: DEBRIDEMENT, WOUND, FINGER, WITH CLOSURE, VY FLap;  Surgeon: Frederic Barbour MD;  Location: [facility] OR;  Service: Plastics;  Laterality: Left;    INSERTION OF BREAST IMPLANT Left 4/20/2023    Procedure: INSERTION, BREAST IMPLANT;  Surgeon: Pipo Bro MD;  Location: [facility] OR;  Service: Plastics;  Laterality: Left;    KNEE SURGERY Right 2015    LEFT HEART CATHETERIZATION Left 11/18/2021    Procedure: CATHETERIZATION, HEART, LEFT;  Surgeon: Basim Castrejon MD;  Location: [facility] CATH;  Service: Cardiology;  Laterality: Left;    PLACEMENT OF ACELLULAR HUMAN DERMAL ALLOGRAFT Left 4/20/2023    Procedure: APPLICATION, ACELLULAR HUMAN DERMAL ALLOGRAFT;  Surgeon: Pipo Bro MD;  Location: [facility] OR;  Service: Plastics;  Laterality: Left;    SENTINEL LYMPH NODE BIOPSY Left 4/20/2023    Procedure: BIOPSY, LYMPH NODE, SENTINEL;  Surgeon: Amanda Mcclellan MD;  Location: [facility] OR;  Service: General;  Laterality: Left;    SIMPLE MASTECTOMY Left 4/20/2023    Procedure: MASTECTOMY, SIMPLE;  Surgeon: Amanda Mcclellan MD;  Location: [facility] OR;  Service: General;  Laterality: Left;    TONSILLECTOMY  1955       Family History:   Family History   Problem Relation Age of Onset    No Known Problems Mother     Heart disease Father     Heart attack Father         at age 64     Cancer Sister         folicular non-hodgkins lymphoma    Lymphoma Sister     Diabetes Maternal Grandmother        Social History:  reports that she has quit smoking. She has never used smokeless tobacco. She reports that she does not drink alcohol and does not use drugs.    Allergies:  Review of patient's allergies indicates:  No Known Allergies    Medications:  Current Outpatient Medications   Medication Sig Dispense Refill    ARMOUR THYROID 30 mg Tab once daily.      cyanocobalamin, vitamin B-12, 3,000 mcg Cap Take by mouth once daily. Triple Blend with folate 680mcg      duke's soln (benadryl 30 mL, mylanta 30 mL, LIDOcaine 30 mL, nystatin 30 mL) 120mL Take 10 mLs by mouth 4 (four) times daily. 120 mL 0    fluticasone propionate (FLONASE) 50 mcg/actuation nasal spray 1 spray by Each Nostril route once daily.      KRILL OIL ORAL Take 2,000 mg by mouth once daily.      losartan (COZAAR) 25 MG tablet once daily.      metFORMIN (GLUCOPHAGE) 500 MG tablet Take 500 mg by mouth 2 (two) times daily.      mupirocin (BACTROBAN) 2 % ointment SMARTSIG:Both Nares      ondansetron (ZOFRAN-ODT) 4 MG TbDL Take 1 tablet (4 mg total) by mouth every 8 (eight) hours as needed (nausea). 12 tablet 0    promethazine (PHENERGAN) 25 MG tablet Take 1 tablet (25 mg total) by mouth every 6 (six) hours as needed for Nausea. 30 tablet 0    rosuvastatin (CRESTOR) 20 MG tablet Take 20 mg by mouth once daily.      traZODone (DESYREL) 50 MG tablet Take 1 tablet (50 mg total) by mouth nightly. 30 tablet 2    UNABLE TO FIND Take 1,000 mg by mouth once daily. medication name: dried liver      VITAMIN A ORAL Take 5,000 Int'l Units/L by mouth once daily.      vitamin K2 45 mcg Cap Take by mouth once daily.      zinc gluconate 50 mg tablet Take 25 mg by mouth once daily.      fluconazole (DIFLUCAN) 150 MG Tab Take one tab by mouth, repeat dose in 4 days 2 tablet 0    magnesium 200 mg Tab Take 400 mg by mouth once daily.       No current  "facility-administered medications for this visit.     Facility-Administered Medications Ordered in Other Visits   Medication Dose Route Frequency Provider Last Rate Last Admin    0.9%  NaCl infusion   Intravenous Continuous Marissa Lei NP           Review of Systems   Constitutional:  Negative for appetite change and unexpected weight change.   HENT:  Negative for mouth sores.    Eyes:  Negative for visual disturbance.   Respiratory:  Negative for cough and shortness of breath.    Cardiovascular:  Negative for chest pain.   Gastrointestinal:  Negative for abdominal pain and diarrhea.   Genitourinary:  Positive for vaginal bleeding. Negative for difficulty urinating, dysuria, flank pain, frequency, hematuria and vaginal discharge.   Musculoskeletal:  Negative for back pain.   Skin:  Negative for rash.   Neurological:  Negative for headaches.   Hematological:  Negative for adenopathy.   Psychiatric/Behavioral:  The patient is not nervous/anxious.      ECOG Performance Status: 0    Objective:      Vitals:   Weight:  Gain of 2 pounds in 1 week  Vitals:    07/07/23 1410   BP: 126/66   BP Location: Left arm   Patient Position: Sitting   BP Method: Medium (Manual)   Pulse: 96   Resp: 16   Temp: 96.7 °F (35.9 °C)   TempSrc: Temporal   SpO2: 96%   Weight: 69.8 kg (153 lb 14.1 oz)   Height: 5' 6" (1.676 m)     BMI: Body mass index is 24.84 kg/m².    Physical Exam  Vitals reviewed.   Constitutional:       General: She is not in acute distress.     Appearance: She is not diaphoretic.   HENT:      Head: Normocephalic and atraumatic.   Eyes:      General: No scleral icterus.  Cardiovascular:      Rate and Rhythm: Normal rate and regular rhythm.      Heart sounds: Normal heart sounds. No murmur heard.  Pulmonary:      Effort: Pulmonary effort is normal. No respiratory distress.   Abdominal:      General: Abdomen is flat. Bowel sounds are normal. There is no distension.      Palpations: Abdomen is soft.      Tenderness: " There is no abdominal tenderness. There is no right CVA tenderness, left CVA tenderness or guarding.   Musculoskeletal:      Right lower leg: No edema.      Left lower leg: No edema.   Lymphadenopathy:      Cervical: No cervical adenopathy.   Skin:     Coloration: Skin is not jaundiced.      Findings: No rash.   Neurological:      Mental Status: She is alert and oriented to person, place, and time.   Psychiatric:         Mood and Affect: Mood normal.         Behavior: Behavior normal.       Laboratory Data:  Lab Results   Component Value Date    WBC 4.43 07/07/2023    HGB 11.0 (L) 07/07/2023    HCT 33.0 (L) 07/07/2023    MCV 95 07/07/2023     07/07/2023      CMP  Sodium   Date Value Ref Range Status   07/07/2023 139 136 - 145 mmol/L Final     Potassium   Date Value Ref Range Status   07/07/2023 4.0 3.5 - 5.1 mmol/L Final     Chloride   Date Value Ref Range Status   07/07/2023 106 95 - 110 mmol/L Final     CO2   Date Value Ref Range Status   07/07/2023 26 23 - 29 mmol/L Final     Glucose   Date Value Ref Range Status   07/07/2023 163 (H) 70 - 110 mg/dL Final     BUN   Date Value Ref Range Status   07/07/2023 21 8 - 23 mg/dL Final     Creatinine   Date Value Ref Range Status   07/07/2023 0.8 0.5 - 1.4 mg/dL Final     Calcium   Date Value Ref Range Status   07/07/2023 8.9 8.7 - 10.5 mg/dL Final     Total Protein   Date Value Ref Range Status   07/07/2023 5.8 (L) 6.0 - 8.4 g/dL Final     Albumin   Date Value Ref Range Status   07/07/2023 3.5 3.5 - 5.2 g/dL Final     Total Bilirubin   Date Value Ref Range Status   07/07/2023 0.4 0.1 - 1.0 mg/dL Final     Comment:     For infants and newborns, interpretation of results should be based  on gestational age, weight and in agreement with clinical  observations.    Premature Infant recommended reference ranges:  Up to 24 hours.............<8.0 mg/dL  Up to 48 hours............<12.0 mg/dL  3-5 days..................<15.0 mg/dL  6-29 days.................<15.0 mg/dL    "    Alkaline Phosphatase   Date Value Ref Range Status   07/07/2023 39 (L) 55 - 135 U/L Final     AST ([place])   Date Value Ref Range Status   12/28/2015 41 (H) 14 - 36 U/L Final     AST   Date Value Ref Range Status   07/07/2023 21 10 - 40 U/L Final     ALT   Date Value Ref Range Status   07/07/2023 28 10 - 44 U/L Final     Anion Gap   Date Value Ref Range Status   07/07/2023 7 (L) 8 - 16 mmol/L Final     eGFR   Date Value Ref Range Status   07/07/2023 >60.0 >60 mL/min/1.73 m^2 Final       Assessment:       1. Invasive ductal carcinoma of left breast in female           Plan:   Invasive ductal carcinoma of the left breast  -no indication for neoadjuvant chemo, no indication for further imaging, no indication for oncotype especially with possible T1a or T1b IDC and age >70's   -ER: 5.4%+, PA: negative, HER-2 non assess initially then HER-2  +after IDC  was found  -significant discussion about adjuvant chemo vs endocrine but given her low ER/PA positivity, HER-2 therapy might be her only way for preventing this cancer from coming back  -Last DEXA scan  in 2016 showed osteopenia   -decision to move forward with TH; began 5/22/23  -Proceed with C8D1 TH; f/u with Dr. Hannah on 07/14 as scheduled with labs prior    Vaginal bleeding  -Most likely due to hormonal changes  -Underwent endometrial biopsy 6/7/2023 shows "overall appearance of this endometrium is suggestive of reparative changes."   -Improving, continue to follow with GYN, Dr. Valladares, would like an james with Dr Bhat  07/19 - appt with Dr. 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    Route Chart for Scheduling  Med Onc Route Chart for Scheduling    Treatment Plan Information   OP BREAST TRASTUZUMAB PACLITAXEL " WEEKLY   Alexandra Hannah MD   Upcoming Treatment Dates - OP BREAST TRASTUZUMAB PACLITAXEL WEEKLY    7/10/2023       Pre-Medications       diphenhydrAMINE (BENADRYL) 50 mg in NS 50 mL IVPB       dexAMETHasone (DECADRON) 10 mg in sodium chloride 0.9% 50 mL IVPB       famotidine (PF) injection 20 mg       Chemotherapy       trastuzumab-dkst (OGIVRI) 136 mg in sodium chloride 0.9% 250 mL chemo infusion       PACLitaxeL (TAXOL) 80 mg/m2 = 144 mg in sodium chloride 0.9% 250 mL chemo infusion  7/17/2023       Pre-Medications       diphenhydrAMINE (BENADRYL) 50 mg in NS 50 mL IVPB       dexAMETHasone (DECADRON) 10 mg in sodium chloride 0.9% 50 mL IVPB       famotidine (PF) injection 20 mg       Chemotherapy       trastuzumab-dkst (OGIVRI) 136 mg in sodium chloride 0.9% 250 mL chemo infusion       PACLitaxeL (TAXOL) 80 mg/m2 = 144 mg in sodium chloride 0.9% 250 mL chemo infusion  7/24/2023       Pre-Medications       diphenhydrAMINE (BENADRYL) 50 mg in NS 50 mL IVPB       dexAMETHasone (DECADRON) 10 mg in sodium chloride 0.9% 50 mL IVPB       famotidine (PF) injection 20 mg       Chemotherapy       trastuzumab-dkst (OGIVRI) 136 mg in sodium chloride 0.9% 250 mL chemo infusion       PACLitaxeL (TAXOL) 80 mg/m2 = 144 mg in sodium chloride 0.9% 250 mL chemo infusion  7/31/2023       Pre-Medications       diphenhydrAMINE (BENADRYL) 50 mg in NS 50 mL IVPB       dexAMETHasone (DECADRON) 10 mg in sodium chloride 0.9% 50 mL IVPB       famotidine (PF) injection 20 mg       Chemotherapy       trastuzumab-dkst (OGIVRI) 136 mg in sodium chloride 0.9% 250 mL chemo infusion       PACLitaxeL (TAXOL) 80 mg/m2 = 144 mg in sodium chloride 0.9% 250 mL chemo infusion

## 2023-07-10 ENCOUNTER — INFUSION (OUTPATIENT)
Dept: INFUSION THERAPY | Facility: HOSPITAL | Age: 73
End: 2023-07-10
Attending: STUDENT IN AN ORGANIZED HEALTH CARE EDUCATION/TRAINING PROGRAM
Payer: MEDICARE

## 2023-07-10 VITALS
RESPIRATION RATE: 16 BRPM | WEIGHT: 152.75 LBS | DIASTOLIC BLOOD PRESSURE: 66 MMHG | TEMPERATURE: 98 F | HEART RATE: 87 BPM | BODY MASS INDEX: 24.55 KG/M2 | HEIGHT: 66 IN | SYSTOLIC BLOOD PRESSURE: 144 MMHG

## 2023-07-10 DIAGNOSIS — C50.912 INVASIVE DUCTAL CARCINOMA OF LEFT BREAST IN FEMALE: ICD-10-CM

## 2023-07-10 DIAGNOSIS — D05.12 DUCTAL CARCINOMA IN SITU (DCIS) OF LEFT BREAST: Primary | ICD-10-CM

## 2023-07-10 PROCEDURE — 25000003 PHARM REV CODE 250: Mod: PN | Performed by: NURSE PRACTITIONER

## 2023-07-10 PROCEDURE — 96413 CHEMO IV INFUSION 1 HR: CPT | Mod: PN

## 2023-07-10 PROCEDURE — 96417 CHEMO IV INFUS EACH ADDL SEQ: CPT | Mod: PN

## 2023-07-10 PROCEDURE — 96367 TX/PROPH/DG ADDL SEQ IV INF: CPT | Mod: PN

## 2023-07-10 PROCEDURE — 63600175 PHARM REV CODE 636 W HCPCS: Mod: PN | Performed by: NURSE PRACTITIONER

## 2023-07-10 PROCEDURE — 96375 TX/PRO/DX INJ NEW DRUG ADDON: CPT | Mod: PN

## 2023-07-10 RX ORDER — FAMOTIDINE 10 MG/ML
20 INJECTION INTRAVENOUS
Status: COMPLETED | OUTPATIENT
Start: 2023-07-10 | End: 2023-07-10

## 2023-07-10 RX ADMIN — SODIUM CHLORIDE: 9 INJECTION, SOLUTION INTRAVENOUS at 12:07

## 2023-07-10 RX ADMIN — PACLITAXEL 144 MG: 6 INJECTION, SOLUTION, CONCENTRATE INTRAVENOUS at 02:07

## 2023-07-10 RX ADMIN — FAMOTIDINE 20 MG: 10 INJECTION INTRAVENOUS at 02:07

## 2023-07-10 RX ADMIN — DEXAMETHASONE SODIUM PHOSPHATE 10 MG: 4 INJECTION INTRA-ARTICULAR; INTRALESIONAL; INTRAMUSCULAR; INTRAVENOUS; SOFT TISSUE at 01:07

## 2023-07-10 RX ADMIN — TRASTUZUMAB 136 MG: 150 INJECTION, POWDER, LYOPHILIZED, FOR SOLUTION INTRAVENOUS at 12:07

## 2023-07-10 RX ADMIN — DIPHENHYDRAMINE HYDROCHLORIDE 50 MG: 50 INJECTION, SOLUTION INTRAMUSCULAR; INTRAVENOUS at 01:07

## 2023-07-10 NOTE — PLAN OF CARE
Pt tolerated Ogivri/Taxol infusion well.  No s/s of infusion reaction noted.  Instructed to call MD with any problems

## 2023-07-12 ENCOUNTER — PATIENT MESSAGE (OUTPATIENT)
Dept: ADMINISTRATIVE | Facility: OTHER | Age: 73
End: 2023-07-12
Payer: MEDICARE

## 2023-07-14 ENCOUNTER — LAB VISIT (OUTPATIENT)
Dept: LAB | Facility: HOSPITAL | Age: 73
End: 2023-07-14
Attending: STUDENT IN AN ORGANIZED HEALTH CARE EDUCATION/TRAINING PROGRAM
Payer: MEDICARE

## 2023-07-14 ENCOUNTER — OFFICE VISIT (OUTPATIENT)
Dept: HEMATOLOGY/ONCOLOGY | Facility: CLINIC | Age: 73
End: 2023-07-14
Payer: MEDICARE

## 2023-07-14 VITALS
WEIGHT: 152.31 LBS | OXYGEN SATURATION: 96 % | HEART RATE: 87 BPM | RESPIRATION RATE: 16 BRPM | BODY MASS INDEX: 24.48 KG/M2 | TEMPERATURE: 98 F | SYSTOLIC BLOOD PRESSURE: 140 MMHG | DIASTOLIC BLOOD PRESSURE: 81 MMHG | HEIGHT: 66 IN

## 2023-07-14 DIAGNOSIS — N93.9 VAGINAL BLEEDING: ICD-10-CM

## 2023-07-14 DIAGNOSIS — C50.912 INVASIVE DUCTAL CARCINOMA OF LEFT BREAST IN FEMALE: Primary | ICD-10-CM

## 2023-07-14 DIAGNOSIS — R93.89 ENDOMETRIAL THICKENING ON ULTRASOUND: ICD-10-CM

## 2023-07-14 DIAGNOSIS — I10 PRIMARY HYPERTENSION: ICD-10-CM

## 2023-07-14 DIAGNOSIS — D84.821 IMMUNODEFICIENCY DUE TO CHEMOTHERAPY: ICD-10-CM

## 2023-07-14 DIAGNOSIS — T45.1X5A IMMUNODEFICIENCY DUE TO CHEMOTHERAPY: ICD-10-CM

## 2023-07-14 DIAGNOSIS — Z79.899 IMMUNODEFICIENCY DUE TO CHEMOTHERAPY: ICD-10-CM

## 2023-07-14 DIAGNOSIS — C50.912 INVASIVE DUCTAL CARCINOMA OF LEFT BREAST IN FEMALE: ICD-10-CM

## 2023-07-14 LAB
ALBUMIN SERPL BCP-MCNC: 3.8 G/DL (ref 3.5–5.2)
ALP SERPL-CCNC: 47 U/L (ref 55–135)
ALT SERPL W/O P-5'-P-CCNC: 39 U/L (ref 10–44)
ANION GAP SERPL CALC-SCNC: 10 MMOL/L (ref 8–16)
AST SERPL-CCNC: 27 U/L (ref 10–40)
BASOPHILS # BLD AUTO: 0.03 K/UL (ref 0–0.2)
BASOPHILS NFR BLD: 0.6 % (ref 0–1.9)
BILIRUB SERPL-MCNC: 0.5 MG/DL (ref 0.1–1)
BUN SERPL-MCNC: 20 MG/DL (ref 8–23)
CALCIUM SERPL-MCNC: 9.7 MG/DL (ref 8.7–10.5)
CHLORIDE SERPL-SCNC: 104 MMOL/L (ref 95–110)
CO2 SERPL-SCNC: 27 MMOL/L (ref 23–29)
CREAT SERPL-MCNC: 0.9 MG/DL (ref 0.5–1.4)
DIFFERENTIAL METHOD: ABNORMAL
EOSINOPHIL # BLD AUTO: 0.1 K/UL (ref 0–0.5)
EOSINOPHIL NFR BLD: 1.6 % (ref 0–8)
ERYTHROCYTE [DISTWIDTH] IN BLOOD BY AUTOMATED COUNT: 13.4 % (ref 11.5–14.5)
EST. GFR  (NO RACE VARIABLE): >60 ML/MIN/1.73 M^2
GLUCOSE SERPL-MCNC: 105 MG/DL (ref 70–110)
HCT VFR BLD AUTO: 33.2 % (ref 37–48.5)
HGB BLD-MCNC: 11.2 G/DL (ref 12–16)
IMM GRANULOCYTES # BLD AUTO: 0.02 K/UL (ref 0–0.04)
IMM GRANULOCYTES NFR BLD AUTO: 0.4 % (ref 0–0.5)
LYMPHOCYTES # BLD AUTO: 1.6 K/UL (ref 1–4.8)
LYMPHOCYTES NFR BLD: 32.5 % (ref 18–48)
MCH RBC QN AUTO: 31.4 PG (ref 27–31)
MCHC RBC AUTO-ENTMCNC: 33.7 G/DL (ref 32–36)
MCV RBC AUTO: 93 FL (ref 82–98)
MONOCYTES # BLD AUTO: 0.2 K/UL (ref 0.3–1)
MONOCYTES NFR BLD: 3.6 % (ref 4–15)
NEUTROPHILS # BLD AUTO: 3 K/UL (ref 1.8–7.7)
NEUTROPHILS NFR BLD: 61.3 % (ref 38–73)
NRBC BLD-RTO: 0 /100 WBC
PLATELET # BLD AUTO: 192 K/UL (ref 150–450)
PMV BLD AUTO: 9.5 FL (ref 9.2–12.9)
POTASSIUM SERPL-SCNC: 4 MMOL/L (ref 3.5–5.1)
PROT SERPL-MCNC: 6.7 G/DL (ref 6–8.4)
RBC # BLD AUTO: 3.57 M/UL (ref 4–5.4)
SODIUM SERPL-SCNC: 141 MMOL/L (ref 136–145)
WBC # BLD AUTO: 4.96 K/UL (ref 3.9–12.7)

## 2023-07-14 PROCEDURE — 99214 OFFICE O/P EST MOD 30 MIN: CPT | Mod: PBBFAC,PN | Performed by: STUDENT IN AN ORGANIZED HEALTH CARE EDUCATION/TRAINING PROGRAM

## 2023-07-14 PROCEDURE — 80053 COMPREHEN METABOLIC PANEL: CPT | Mod: PN | Performed by: STUDENT IN AN ORGANIZED HEALTH CARE EDUCATION/TRAINING PROGRAM

## 2023-07-14 PROCEDURE — 36415 COLL VENOUS BLD VENIPUNCTURE: CPT | Mod: PN | Performed by: STUDENT IN AN ORGANIZED HEALTH CARE EDUCATION/TRAINING PROGRAM

## 2023-07-14 PROCEDURE — 99999 PR PBB SHADOW E&M-EST. PATIENT-LVL IV: CPT | Mod: PBBFAC,,, | Performed by: STUDENT IN AN ORGANIZED HEALTH CARE EDUCATION/TRAINING PROGRAM

## 2023-07-14 PROCEDURE — 85025 COMPLETE CBC W/AUTO DIFF WBC: CPT | Mod: PN | Performed by: STUDENT IN AN ORGANIZED HEALTH CARE EDUCATION/TRAINING PROGRAM

## 2023-07-14 PROCEDURE — 99215 OFFICE O/P EST HI 40 MIN: CPT | Mod: S$PBB,,, | Performed by: STUDENT IN AN ORGANIZED HEALTH CARE EDUCATION/TRAINING PROGRAM

## 2023-07-14 PROCEDURE — 99215 PR OFFICE/OUTPT VISIT, EST, LEVL V, 40-54 MIN: ICD-10-PCS | Mod: S$PBB,,, | Performed by: STUDENT IN AN ORGANIZED HEALTH CARE EDUCATION/TRAINING PROGRAM

## 2023-07-14 PROCEDURE — 99999 PR PBB SHADOW E&M-EST. PATIENT-LVL IV: ICD-10-PCS | Mod: PBBFAC,,, | Performed by: STUDENT IN AN ORGANIZED HEALTH CARE EDUCATION/TRAINING PROGRAM

## 2023-07-14 RX ORDER — HEPARIN 100 UNIT/ML
500 SYRINGE INTRAVENOUS
Status: CANCELLED | OUTPATIENT
Start: 2023-07-17

## 2023-07-14 RX ORDER — SODIUM CHLORIDE 0.9 % (FLUSH) 0.9 %
10 SYRINGE (ML) INJECTION
Status: CANCELLED | OUTPATIENT
Start: 2023-07-17

## 2023-07-14 RX ORDER — FAMOTIDINE 10 MG/ML
20 INJECTION INTRAVENOUS
Status: CANCELLED | OUTPATIENT
Start: 2023-07-17

## 2023-07-14 RX ORDER — DIPHENHYDRAMINE HYDROCHLORIDE 50 MG/ML
50 INJECTION INTRAMUSCULAR; INTRAVENOUS ONCE AS NEEDED
Status: CANCELLED | OUTPATIENT
Start: 2023-07-17

## 2023-07-14 RX ORDER — EPINEPHRINE 0.3 MG/.3ML
0.3 INJECTION SUBCUTANEOUS ONCE AS NEEDED
Status: CANCELLED | OUTPATIENT
Start: 2023-07-17

## 2023-07-14 NOTE — PROGRESS NOTES
"  PATIENT: Margaret Rouse  MRN: [id number]  DATE: 7/24/2023    Diagnosis:   1. Invasive ductal carcinoma of left breast in female    2. Vaginal bleeding    3. Endometrial thickening on ultrasound    4. Immunodeficiency due to chemotherapy    5. Primary hypertension        Chief Complaint: Follow-up and Breast Cancer    Subjective:   HPI: Ms. Rouse is a 72 y.o. female with invasive ductal carcinoma of the left breast who is here today for follow up and consideration of C10D1 Taxol/Ogivri on 6/26/23.    She began treatment with Paclitaxel and Trastuzumab on 5/22/23.     Vaginal bleeding has improved, mild spotting now. Seems to be occurring monthly.   Underwent endometrial biopsy 6/7/2023 shows "overall appearance of this endometrium is suggestive of reparative changes." an james with Dr Cristhian Samuels IZQUIERDO, CP, SOB, cough, abd pain, diarrhea, constipation, dysuria, swelling, new lumps or bumps, rashes. Dneies fevers, chills.     Oncology History:   2/23/23 Seen for a newly diagnosed stage IA IDC of the left breast. MRI Breasts: clumped non-mass enhancement with associated clip 3.1 x 1.2 x 2.9cm with sub-cm satellites inferiorly up to 6mm at 1.6cm inferior.  No abnormal SARAH. Biopsy positive for IDC/DCIS, ER; low positive, MS: negative, no HER-2 since was insufficient invasive tumor for IDC and was run on the DCIS part.    5/11/23 ECHO with EF 65%  Oncology History   Invasive ductal carcinoma of left breast in female   2/27/2023 Initial Diagnosis    Invasive ductal carcinoma of left breast in female     2/27/2023 Cancer Staged    Staging form: Breast, AJCC 8th Edition  - Clinical stage from 2/27/2023: cT1mi, cN0, cM0, G3, ER+, MS-, HER2: Not Assessed     5/1/2023 Cancer Staged    Staging form: Breast, AJCC 8th Edition  - Pathologic stage from 5/1/2023: Stage IA (pT1a, pN0(sn), cM0, G2, ER-, MS-, HER2+)     5/22/2023 -  Chemotherapy    Treatment Summary   Plan Name: OP BREAST TRASTUZUMAB PACLITAXEL WEEKLY  Treatment " Goal: Curative  Status: Active  Start Date: 5/22/2023  End Date: 4/22/2024 (Planned)  Provider: Alexandra Hannah MD  Chemotherapy: PACLitaxeL (TAXOL) 80 mg/m2 = 144 mg in sodium chloride 0.9% 250 mL chemo infusion, 80 mg/m2 = 144 mg, Intravenous, Clinic/[facility] 1 time, 9 of 12 cycles  Administration: 144 mg (5/22/2023), 144 mg (5/29/2023), 144 mg (6/5/2023), 144 mg (6/12/2023), 144 mg (6/19/2023), 144 mg (6/26/2023), 144 mg (7/3/2023), 144 mg (7/10/2023), 144 mg (7/17/2023)  trastuzumab-dkst (OGIVRI) 278 mg in sodium chloride 0.9% 298.2 mL chemo infusion, 4 mg/kg = 278 mg, Intravenous, Clinic/[facility] 1 time, 9 of 25 cycles  Administration: 278 mg (5/22/2023), 139 mg (5/29/2023), 136 mg (6/5/2023), 136 mg (6/12/2023), 136 mg (6/19/2023), 136 mg (6/26/2023), 136 mg (7/3/2023), 136 mg (7/10/2023), 136 mg (7/17/2023)     Ductal carcinoma in situ (DCIS) of left breast   3/5/2023 Initial Diagnosis    Ductal carcinoma in situ (DCIS) of left breast     5/22/2023 -  Chemotherapy    Treatment Summary   Plan Name: OP BREAST TRASTUZUMAB PACLITAXEL WEEKLY  Treatment Goal: Curative  Status: Active  Start Date: 5/22/2023  End Date: 4/22/2024 (Planned)  Provider: Alexandra Hannah MD  Chemotherapy: PACLitaxeL (TAXOL) 80 mg/m2 = 144 mg in sodium chloride 0.9% 250 mL chemo infusion, 80 mg/m2 = 144 mg, Intravenous, Clinic/[facility] 1 time, 9 of 12 cycles  Administration: 144 mg (5/22/2023), 144 mg (5/29/2023), 144 mg (6/5/2023), 144 mg (6/12/2023), 144 mg (6/19/2023), 144 mg (6/26/2023), 144 mg (7/3/2023), 144 mg (7/10/2023), 144 mg (7/17/2023)  trastuzumab-dkst (OGIVRI) 278 mg in sodium chloride 0.9% 298.2 mL chemo infusion, 4 mg/kg = 278 mg, Intravenous, Clinic/[facility] 1 time, 9 of 25 cycles  Administration: 278 mg (5/22/2023), 139 mg (5/29/2023), 136 mg (6/5/2023), 136 mg (6/12/2023), 136 mg (6/19/2023), 136 mg (6/26/2023), 136 mg (7/3/2023), 136 mg (7/10/2023), 136 mg (7/17/2023)        Past Medical History:   Past Medical History:   Diagnosis Date     Abnormal results of liver function studies     Cancer     left breast cancer    Chest pain     Fatigue     H/O: pneumonia     History of chicken pox     Hyperlipidemia, mixed     Hypertension     Menopausal syndrome     Palpitations 12/15/2015    PONV (postoperative nausea and vomiting)     Thyroid disease        Past Surgical HIstory:   Past Surgical History:   Procedure Laterality Date    BREAST RECONSTRUCTION Left 4/20/2023    Procedure: RECONSTRUCTION, BREAST;  Surgeon: Pipo Bro MD;  Location: [facility] OR;  Service: Plastics;  Laterality: Left;    CATARACT EXTRACTION W/ INTRAOCULAR LENS  IMPLANT, BILATERAL      COLONOSCOPY      COSMETIC SURGERY      DEBRIDEMENT AND CLOSURE OF WOUND OF FINGER Left 11/29/2018    Procedure: DEBRIDEMENT, WOUND, FINGER, WITH CLOSURE, VY FLap;  Surgeon: Frederic Barbour MD;  Location: [facility] OR;  Service: Plastics;  Laterality: Left;    INSERTION OF BREAST IMPLANT Left 4/20/2023    Procedure: INSERTION, BREAST IMPLANT;  Surgeon: Pipo Bro MD;  Location: [facility] OR;  Service: Plastics;  Laterality: Left;    KNEE SURGERY Right 2015    LEFT HEART CATHETERIZATION Left 11/18/2021    Procedure: CATHETERIZATION, HEART, LEFT;  Surgeon: Basim Castrejon MD;  Location: [facility] CATH;  Service: Cardiology;  Laterality: Left;    PLACEMENT OF ACELLULAR HUMAN DERMAL ALLOGRAFT Left 4/20/2023    Procedure: APPLICATION, ACELLULAR HUMAN DERMAL ALLOGRAFT;  Surgeon: Pipo Bro MD;  Location: [facility] OR;  Service: Plastics;  Laterality: Left;    SENTINEL LYMPH NODE BIOPSY Left 4/20/2023    Procedure: BIOPSY, LYMPH NODE, SENTINEL;  Surgeon: Amanda Mcclellan MD;  Location: [facility] OR;  Service: General;  Laterality: Left;    SIMPLE MASTECTOMY Left 4/20/2023    Procedure: MASTECTOMY, SIMPLE;  Surgeon: Amanda Mcclellan MD;  Location: [facility] OR;  Service: General;  Laterality: Left;    TONSILLECTOMY  1955       Family History:   Family History   Problem Relation Age of Onset    No Known Problems Mother     Heart  disease Father     Heart attack Father         at age 64    Cancer Sister         folicular non-hodgkins lymphoma    Lymphoma Sister     Diabetes Maternal Grandmother        Social History:  reports that she has quit smoking. She has never used smokeless tobacco. She reports that she does not drink alcohol and does not use drugs.    Allergies:  Review of patient's allergies indicates:  No Known Allergies    Medications:  Current Outpatient Medications   Medication Sig Dispense Refill    ARMOUR THYROID 30 mg Tab once daily.      cyanocobalamin, vitamin B-12, 3,000 mcg Cap Take by mouth once daily. Triple Blend with folate 680mcg      duke's soln (benadryl 30 mL, mylanta 30 mL, LIDOcaine 30 mL, nystatin 30 mL) 120mL Take 10 mLs by mouth 4 (four) times daily. 120 mL 0    fluticasone propionate (FLONASE) 50 mcg/actuation nasal spray 1 spray by Each Nostril route once daily.      KRILL OIL ORAL Take 2,000 mg by mouth once daily.      losartan (COZAAR) 25 MG tablet once daily.      magnesium 200 mg Tab Take 400 mg by mouth once daily.      metFORMIN (GLUCOPHAGE) 500 MG tablet Take 500 mg by mouth 2 (two) times daily.      mupirocin (BACTROBAN) 2 % ointment SMARTSIG:Both Nares      ondansetron (ZOFRAN-ODT) 4 MG TbDL Take 1 tablet (4 mg total) by mouth every 8 (eight) hours as needed (nausea). 12 tablet 0    promethazine (PHENERGAN) 25 MG tablet Take 1 tablet (25 mg total) by mouth every 6 (six) hours as needed for Nausea. 30 tablet 0    rosuvastatin (CRESTOR) 20 MG tablet Take 20 mg by mouth once daily.      traZODone (DESYREL) 50 MG tablet Take 1 tablet (50 mg total) by mouth nightly. 30 tablet 2    UNABLE TO FIND Take 1,000 mg by mouth once daily. medication name: dried liver      VITAMIN A ORAL Take 5,000 Int'l Units/L by mouth once daily.      vitamin K2 45 mcg Cap Take by mouth once daily.      zinc gluconate 50 mg tablet Take 25 mg by mouth once daily.       No current facility-administered medications for this  "visit.     Facility-Administered Medications Ordered in Other Visits   Medication Dose Route Frequency Provider Last Rate Last Admin    0.9%  NaCl infusion   Intravenous Continuous Marsisa Lei NP           Review of Systems   Constitutional:  Negative for appetite change and unexpected weight change.   HENT:  Negative for mouth sores.    Eyes:  Negative for visual disturbance.   Respiratory:  Negative for cough and shortness of breath.    Cardiovascular:  Negative for chest pain.   Gastrointestinal:  Negative for abdominal pain and diarrhea.   Genitourinary:  Positive for vaginal bleeding. Negative for difficulty urinating, dysuria, flank pain, frequency, hematuria and vaginal discharge.   Musculoskeletal:  Negative for back pain.   Skin:  Negative for rash.   Neurological:  Negative for headaches.   Hematological:  Negative for adenopathy.   Psychiatric/Behavioral:  The patient is not nervous/anxious.      ECOG Performance Status:   ECOG SCORE             Objective:      Vitals:   Vitals:    07/14/23 1311   BP: (!) 140/81   BP Location: Right arm   Patient Position: Sitting   BP Method: Medium (Manual)   Pulse: 87   Resp: 16   Temp: 97.8 °F (36.6 °C)   TempSrc: Temporal   SpO2: 96%   Weight: 69.1 kg (152 lb 5.4 oz)   Height: 5' 6" (1.676 m)             BMI: Body mass index is 24.59 kg/m².    Physical Exam  Vitals reviewed.   Constitutional:       General: She is not in acute distress.     Appearance: She is not diaphoretic.   HENT:      Head: Normocephalic and atraumatic.   Eyes:      General: No scleral icterus.  Cardiovascular:      Rate and Rhythm: Normal rate and regular rhythm.      Heart sounds: Normal heart sounds. No murmur heard.  Pulmonary:      Effort: Pulmonary effort is normal. No respiratory distress.   Abdominal:      General: Abdomen is flat. Bowel sounds are normal. There is no distension.      Palpations: Abdomen is soft.      Tenderness: There is no abdominal tenderness. There is no right " "CVA tenderness, left CVA tenderness or guarding.   Musculoskeletal:      Right lower leg: No edema.      Left lower leg: No edema.   Lymphadenopathy:      Cervical: No cervical adenopathy.   Skin:     Coloration: Skin is not jaundiced.      Findings: No rash.   Neurological:      Mental Status: She is alert and oriented to person, place, and time.   Psychiatric:         Mood and Affect: Mood normal.         Behavior: Behavior normal.       Laboratory Data:  Lab Results   Component Value Date    WBC 3.92 07/21/2023    HGB 11.1 (L) 07/21/2023    HCT 33.0 (L) 07/21/2023    MCV 95 07/21/2023     07/21/2023        Assessment:       1. Invasive ductal carcinoma of left breast in female    2. Vaginal bleeding    3. Endometrial thickening on ultrasound    4. Immunodeficiency due to chemotherapy    5. Primary hypertension             Plan:   Invasive ductal carcinoma of the left breast  -no indication for neoadjuvant chemo, no indication for further imaging, no indication for oncotype especially with possible T1a or T1b IDC and age >70's   -ER: 5.4%+, MI: negative, HER-2 non assess initially then HER-2  +after IDC  was found  -significant discussion about adjuvant chemo vs endocrine but given her low ER/MI positivity, HER-2 therapy might be her only way for preventing this cancer from coming back  -Last DEXA scan  in 2016 showed osteopenia   -decision to move forward with TH; began 5/22/23  -Proceed with C10D1 TH    Vaginal bleeding  -Most likely due to hormonal changes  -Underwent endometrial biopsy 6/7/2023 shows "overall appearance of this endometrium is suggestive of reparative changes."   -Improving, continue to follow with GYN, Dr. Valladares, would like an james with Dr Morrow      Immunodeficiency due to drug/chemotherapy  -Patient at risk for infection   -No current signs/symptoms of infection  -Continue to monitor closely while on therapy       42 minutes were spent in coordination of patient's care, record review " and counseling.    Route Chart for Scheduling    Med Onc Chart Routing      Follow up with physician . Keep james as they are   Follow up with JAMES    Infusion scheduling note    Injection scheduling note    Labs CBC, CMP and magnesium   Scheduling:  Preferred lab:  Lab interval:  prior to each infusion (standing orders)   Imaging    Pharmacy appointment    Other referrals            Treatment Plan Information   OP BREAST TRASTUZUMAB PACLITAXEL WEEKLY   Alexandra Hannah MD   Upcoming Treatment Dates - OP BREAST TRASTUZUMAB PACLITAXEL WEEKLY    7/24/2023       Pre-Medications       diphenhydrAMINE (BENADRYL) 25 mg in NS 50 mL IVPB       dexAMETHasone IVPB 4 mg 50 mL       famotidine (PF) injection 20 mg       Chemotherapy       trastuzumab-dkst (OGIVRI) 136 mg in sodium chloride 0.9% 250 mL chemo infusion       PACLitaxeL (TAXOL) 80 mg/m2 = 144 mg in sodium chloride 0.9% 250 mL chemo infusion  7/31/2023       Pre-Medications       diphenhydrAMINE (BENADRYL) 25 mg in NS 50 mL IVPB       dexAMETHasone IVPB 4 mg 50 mL       famotidine (PF) injection 20 mg       Chemotherapy       trastuzumab-dkst (OGIVRI) 136 mg in sodium chloride 0.9% 250 mL chemo infusion       PACLitaxeL (TAXOL) 80 mg/m2 = 144 mg in sodium chloride 0.9% 250 mL chemo infusion  8/7/2023       Pre-Medications       diphenhydrAMINE (BENADRYL) 25 mg in NS 50 mL IVPB       dexAMETHasone IVPB 4 mg 50 mL       famotidine (PF) injection 20 mg       Chemotherapy       trastuzumab-dkst (OGIVRI) 136 mg in sodium chloride 0.9% 250 mL chemo infusion       PACLitaxeL (TAXOL) 80 mg/m2 = 144 mg in sodium chloride 0.9% 250 mL chemo infusion  8/14/2023       Chemotherapy       trastuzumab-dkst (OGIVRI) 407 mg in sodium chloride 0.9% 250 mL chemo infusion    Alexandra Hannah MD  Hematology and Oncology  [facility]  [facility] [place] [facility]

## 2023-07-17 ENCOUNTER — DOCUMENTATION ONLY (OUTPATIENT)
Dept: INFUSION THERAPY | Facility: HOSPITAL | Age: 73
End: 2023-07-17
Payer: MEDICARE

## 2023-07-17 ENCOUNTER — INFUSION (OUTPATIENT)
Dept: INFUSION THERAPY | Facility: HOSPITAL | Age: 73
End: 2023-07-17
Attending: STUDENT IN AN ORGANIZED HEALTH CARE EDUCATION/TRAINING PROGRAM
Payer: MEDICARE

## 2023-07-17 VITALS
SYSTOLIC BLOOD PRESSURE: 130 MMHG | HEIGHT: 66 IN | BODY MASS INDEX: 24.94 KG/M2 | TEMPERATURE: 98 F | HEART RATE: 88 BPM | WEIGHT: 155.19 LBS | RESPIRATION RATE: 16 BRPM | DIASTOLIC BLOOD PRESSURE: 80 MMHG

## 2023-07-17 DIAGNOSIS — C50.912 INVASIVE DUCTAL CARCINOMA OF LEFT BREAST IN FEMALE: ICD-10-CM

## 2023-07-17 DIAGNOSIS — D05.12 DUCTAL CARCINOMA IN SITU (DCIS) OF LEFT BREAST: Primary | ICD-10-CM

## 2023-07-17 PROCEDURE — 96367 TX/PROPH/DG ADDL SEQ IV INF: CPT

## 2023-07-17 PROCEDURE — 96375 TX/PRO/DX INJ NEW DRUG ADDON: CPT | Mod: PN

## 2023-07-17 PROCEDURE — 96417 CHEMO IV INFUS EACH ADDL SEQ: CPT | Mod: PN

## 2023-07-17 PROCEDURE — 63600175 PHARM REV CODE 636 W HCPCS: Mod: JW,JG,PN | Performed by: STUDENT IN AN ORGANIZED HEALTH CARE EDUCATION/TRAINING PROGRAM

## 2023-07-17 PROCEDURE — 25000003 PHARM REV CODE 250: Mod: PN | Performed by: STUDENT IN AN ORGANIZED HEALTH CARE EDUCATION/TRAINING PROGRAM

## 2023-07-17 PROCEDURE — 96413 CHEMO IV INFUSION 1 HR: CPT | Mod: PN

## 2023-07-17 RX ORDER — FAMOTIDINE 10 MG/ML
20 INJECTION INTRAVENOUS
Status: COMPLETED | OUTPATIENT
Start: 2023-07-17 | End: 2023-07-17

## 2023-07-17 RX ORDER — DIPHENHYDRAMINE HYDROCHLORIDE 50 MG/ML
50 INJECTION INTRAMUSCULAR; INTRAVENOUS ONCE AS NEEDED
Status: DISCONTINUED | OUTPATIENT
Start: 2023-07-17 | End: 2023-07-17 | Stop reason: HOSPADM

## 2023-07-17 RX ORDER — DEXAMETHASONE SODIUM PHOSPHATE 4 MG/ML
4 INJECTION, SOLUTION INTRA-ARTICULAR; INTRALESIONAL; INTRAMUSCULAR; INTRAVENOUS; SOFT TISSUE
Status: COMPLETED | OUTPATIENT
Start: 2023-07-17 | End: 2023-07-17

## 2023-07-17 RX ORDER — EPINEPHRINE 0.3 MG/.3ML
0.3 INJECTION SUBCUTANEOUS ONCE AS NEEDED
Status: DISCONTINUED | OUTPATIENT
Start: 2023-07-17 | End: 2023-07-17 | Stop reason: HOSPADM

## 2023-07-17 RX ORDER — SODIUM CHLORIDE 0.9 % (FLUSH) 0.9 %
10 SYRINGE (ML) INJECTION
Status: DISCONTINUED | OUTPATIENT
Start: 2023-07-17 | End: 2023-07-17 | Stop reason: HOSPADM

## 2023-07-17 RX ADMIN — TRASTUZUMAB 136 MG: 150 INJECTION, POWDER, LYOPHILIZED, FOR SOLUTION INTRAVENOUS at 12:07

## 2023-07-17 RX ADMIN — SODIUM CHLORIDE: 9 INJECTION, SOLUTION INTRAVENOUS at 11:07

## 2023-07-17 RX ADMIN — DIPHENHYDRAMINE HYDROCHLORIDE 25 MG: 50 INJECTION, SOLUTION INTRAMUSCULAR; INTRAVENOUS at 01:07

## 2023-07-17 RX ADMIN — FAMOTIDINE 20 MG: 10 INJECTION INTRAVENOUS at 01:07

## 2023-07-17 RX ADMIN — DEXAMETHASONE SODIUM PHOSPHATE 4 MG: 4 INJECTION INTRA-ARTICULAR; INTRALESIONAL; INTRAMUSCULAR; INTRAVENOUS; SOFT TISSUE at 01:07

## 2023-07-17 RX ADMIN — PACLITAXEL 144 MG: 6 INJECTION, SOLUTION, CONCENTRATE INTRAVENOUS at 01:07

## 2023-07-17 NOTE — PROGRESS NOTES
"Oncology Nutrition   Chemotherapy Infusion Visit    Nutrition Follow Up   RD met with pt at chairside during infusion tx. Pt states her appetite is "too good". Pt reports she has no "will power" and eats whatever she wants. Pt states she would like to lose some weight, RD discussed importance of not losing weight while under active tx. RD recommend she and spouse eat portion sizes. RD provided pt with a handout on portion sizes. Pt appreciative of RD visit.       Wt Readings from Last 10 Encounters:   07/17/23 70.4 kg (155 lb 3.3 oz)   07/14/23 69.1 kg (152 lb 5.4 oz)   07/10/23 69.3 kg (152 lb 12.5 oz)   07/07/23 69.8 kg (153 lb 14.1 oz)   07/03/23 69.2 kg (152 lb 8.9 oz)   06/30/23 68.9 kg (151 lb 14.4 oz)   06/26/23 69.1 kg (152 lb 5.4 oz)   06/23/23 68.4 kg (150 lb 12.7 oz)   06/19/23 69.4 kg (153 lb)   06/16/23 68.2 kg (150 lb 5.7 oz)       All other nutrition questions/concerns addressed as appropriate. Will continue to monitor prn throughout treatment.     Britt Laureano, SARA, LDN  07/17/2023  3:25 PM         "

## 2023-07-17 NOTE — PLAN OF CARE
Problem: Adult Inpatient Plan of Care  Goal: Plan of Care Review  Outcome: Ongoing, Progressing  Flowsheets (Taken 7/17/2023 1217)  Plan of Care Reviewed With:   patient   spouse  Goal: Patient-Specific Goal (Individualized)  Outcome: Ongoing, Progressing  Flowsheets (Taken 7/17/2023 1217)  Anxieties, Fears or Concerns: None  Individualized Care Needs: Recliner, warm blanket, pillow, dimmed lights, converstion,  at chairside.       Patient to Infusion for Ogivri and TAxol, accompanied by her . Patient states she spoke w MD about lowering benadryl and dexamethasone doses after last treatment, now worried about implication for reactions. Discussed reaction protocol. She verbalizes understanding. Treatment plan reviewed with patient. VSS. Tolerated infusion. Provided with copy of upcoming appointment schedule. Escorted to the front lobby in no distress for discharge to home.

## 2023-07-19 ENCOUNTER — TELEPHONE (OUTPATIENT)
Dept: HEMATOLOGY/ONCOLOGY | Facility: CLINIC | Age: 73
End: 2023-07-19
Payer: MEDICARE

## 2023-07-19 ENCOUNTER — OFFICE VISIT (OUTPATIENT)
Dept: GYNECOLOGIC ONCOLOGY | Facility: CLINIC | Age: 73
End: 2023-07-19
Payer: MEDICARE

## 2023-07-19 ENCOUNTER — PATIENT MESSAGE (OUTPATIENT)
Dept: ADMINISTRATIVE | Facility: OTHER | Age: 73
End: 2023-07-19
Payer: MEDICARE

## 2023-07-19 VITALS
BODY MASS INDEX: 24.84 KG/M2 | WEIGHT: 154.56 LBS | OXYGEN SATURATION: 96 % | HEIGHT: 66 IN | HEART RATE: 84 BPM | TEMPERATURE: 98 F | RESPIRATION RATE: 16 BRPM | DIASTOLIC BLOOD PRESSURE: 76 MMHG | SYSTOLIC BLOOD PRESSURE: 136 MMHG

## 2023-07-19 DIAGNOSIS — R93.89 ENDOMETRIAL THICKENING ON ULTRASOUND: ICD-10-CM

## 2023-07-19 DIAGNOSIS — N93.9 VAGINAL BLEEDING: ICD-10-CM

## 2023-07-19 DIAGNOSIS — C50.912 INVASIVE DUCTAL CARCINOMA OF LEFT BREAST IN FEMALE: ICD-10-CM

## 2023-07-19 PROCEDURE — 99999 PR PBB SHADOW E&M-EST. PATIENT-LVL IV: CPT | Mod: PBBFAC,,, | Performed by: OBSTETRICS & GYNECOLOGY

## 2023-07-19 PROCEDURE — 99999 PR PBB SHADOW E&M-EST. PATIENT-LVL IV: ICD-10-PCS | Mod: PBBFAC,,, | Performed by: OBSTETRICS & GYNECOLOGY

## 2023-07-19 PROCEDURE — 99204 OFFICE O/P NEW MOD 45 MIN: CPT | Mod: S$PBB,,, | Performed by: OBSTETRICS & GYNECOLOGY

## 2023-07-19 PROCEDURE — 99214 OFFICE O/P EST MOD 30 MIN: CPT | Mod: PBBFAC,PN | Performed by: OBSTETRICS & GYNECOLOGY

## 2023-07-19 PROCEDURE — 99204 PR OFFICE/OUTPT VISIT, NEW, LEVL IV, 45-59 MIN: ICD-10-PCS | Mod: S$PBB,,, | Performed by: OBSTETRICS & GYNECOLOGY

## 2023-07-19 NOTE — NURSING
Met with patient and  in clinic today.  Reviewed plan of care.  Dr. Hernandez's office will reach out to pt to schedule surgery.   Encouraged her to call with any questions or concerns.  She verbalized understanding.
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
## 2023-07-21 ENCOUNTER — OFFICE VISIT (OUTPATIENT)
Dept: HEMATOLOGY/ONCOLOGY | Facility: CLINIC | Age: 73
End: 2023-07-21
Payer: MEDICARE

## 2023-07-21 ENCOUNTER — LAB VISIT (OUTPATIENT)
Dept: LAB | Facility: HOSPITAL | Age: 73
End: 2023-07-21
Attending: STUDENT IN AN ORGANIZED HEALTH CARE EDUCATION/TRAINING PROGRAM
Payer: MEDICARE

## 2023-07-21 VITALS
RESPIRATION RATE: 16 BRPM | OXYGEN SATURATION: 97 % | HEIGHT: 66 IN | HEART RATE: 93 BPM | TEMPERATURE: 97 F | WEIGHT: 151.69 LBS | DIASTOLIC BLOOD PRESSURE: 64 MMHG | SYSTOLIC BLOOD PRESSURE: 138 MMHG | BODY MASS INDEX: 24.38 KG/M2

## 2023-07-21 DIAGNOSIS — D64.81 ANEMIA ASSOCIATED WITH CHEMOTHERAPY: ICD-10-CM

## 2023-07-21 DIAGNOSIS — Z79.899 IMMUNODEFICIENCY DUE TO CHEMOTHERAPY: ICD-10-CM

## 2023-07-21 DIAGNOSIS — T45.1X5A ANEMIA ASSOCIATED WITH CHEMOTHERAPY: ICD-10-CM

## 2023-07-21 DIAGNOSIS — T45.1X5A IMMUNODEFICIENCY DUE TO CHEMOTHERAPY: ICD-10-CM

## 2023-07-21 DIAGNOSIS — C50.912 INVASIVE DUCTAL CARCINOMA OF LEFT BREAST IN FEMALE: ICD-10-CM

## 2023-07-21 DIAGNOSIS — C50.912 INVASIVE DUCTAL CARCINOMA OF LEFT BREAST IN FEMALE: Primary | ICD-10-CM

## 2023-07-21 DIAGNOSIS — K12.31 MUCOSITIS DUE TO CHEMOTHERAPY: ICD-10-CM

## 2023-07-21 DIAGNOSIS — D84.821 IMMUNODEFICIENCY DUE TO CHEMOTHERAPY: ICD-10-CM

## 2023-07-21 DIAGNOSIS — N93.9 VAGINAL BLEEDING: ICD-10-CM

## 2023-07-21 LAB
ALBUMIN SERPL BCP-MCNC: 3.8 G/DL (ref 3.5–5.2)
ALP SERPL-CCNC: 47 U/L (ref 55–135)
ALT SERPL W/O P-5'-P-CCNC: 31 U/L (ref 10–44)
ANION GAP SERPL CALC-SCNC: 9 MMOL/L (ref 8–16)
AST SERPL-CCNC: 20 U/L (ref 10–40)
BASOPHILS # BLD AUTO: 0.02 K/UL (ref 0–0.2)
BASOPHILS NFR BLD: 0.5 % (ref 0–1.9)
BILIRUB SERPL-MCNC: 0.8 MG/DL (ref 0.1–1)
BUN SERPL-MCNC: 19 MG/DL (ref 8–23)
CALCIUM SERPL-MCNC: 9 MG/DL (ref 8.7–10.5)
CHLORIDE SERPL-SCNC: 107 MMOL/L (ref 95–110)
CO2 SERPL-SCNC: 24 MMOL/L (ref 23–29)
CREAT SERPL-MCNC: 0.8 MG/DL (ref 0.5–1.4)
DIFFERENTIAL METHOD: ABNORMAL
EOSINOPHIL # BLD AUTO: 0.1 K/UL (ref 0–0.5)
EOSINOPHIL NFR BLD: 1.8 % (ref 0–8)
ERYTHROCYTE [DISTWIDTH] IN BLOOD BY AUTOMATED COUNT: 13.8 % (ref 11.5–14.5)
EST. GFR  (NO RACE VARIABLE): >60 ML/MIN/1.73 M^2
FERRITIN SERPL-MCNC: 215 NG/ML (ref 20–300)
GLUCOSE SERPL-MCNC: 98 MG/DL (ref 70–110)
HCT VFR BLD AUTO: 33 % (ref 37–48.5)
HGB BLD-MCNC: 11.1 G/DL (ref 12–16)
IMM GRANULOCYTES # BLD AUTO: 0.02 K/UL (ref 0–0.04)
IMM GRANULOCYTES NFR BLD AUTO: 0.5 % (ref 0–0.5)
IRON SERPL-MCNC: 163 UG/DL (ref 30–160)
LYMPHOCYTES # BLD AUTO: 1.4 K/UL (ref 1–4.8)
LYMPHOCYTES NFR BLD: 34.9 % (ref 18–48)
MAGNESIUM SERPL-MCNC: 1.7 MG/DL (ref 1.6–2.6)
MCH RBC QN AUTO: 32 PG (ref 27–31)
MCHC RBC AUTO-ENTMCNC: 33.6 G/DL (ref 32–36)
MCV RBC AUTO: 95 FL (ref 82–98)
MONOCYTES # BLD AUTO: 0.2 K/UL (ref 0.3–1)
MONOCYTES NFR BLD: 4.1 % (ref 4–15)
NEUTROPHILS # BLD AUTO: 2.3 K/UL (ref 1.8–7.7)
NEUTROPHILS NFR BLD: 58.2 % (ref 38–73)
NRBC BLD-RTO: 0 /100 WBC
PLATELET # BLD AUTO: 195 K/UL (ref 150–450)
PMV BLD AUTO: 9.8 FL (ref 9.2–12.9)
POTASSIUM SERPL-SCNC: 4.3 MMOL/L (ref 3.5–5.1)
PROT SERPL-MCNC: 6.6 G/DL (ref 6–8.4)
RBC # BLD AUTO: 3.47 M/UL (ref 4–5.4)
SATURATED IRON: 42 % (ref 20–50)
SODIUM SERPL-SCNC: 140 MMOL/L (ref 136–145)
TOTAL IRON BINDING CAPACITY: 385 UG/DL (ref 250–450)
TRANSFERRIN SERPL-MCNC: 260 MG/DL (ref 200–375)
WBC # BLD AUTO: 3.92 K/UL (ref 3.9–12.7)

## 2023-07-21 PROCEDURE — 83735 ASSAY OF MAGNESIUM: CPT | Mod: PN | Performed by: STUDENT IN AN ORGANIZED HEALTH CARE EDUCATION/TRAINING PROGRAM

## 2023-07-21 PROCEDURE — 99214 OFFICE O/P EST MOD 30 MIN: CPT | Mod: S$PBB,,,

## 2023-07-21 PROCEDURE — 99999 PR PBB SHADOW E&M-EST. PATIENT-LVL IV: CPT | Mod: PBBFAC,,,

## 2023-07-21 PROCEDURE — 82728 ASSAY OF FERRITIN: CPT

## 2023-07-21 PROCEDURE — 36415 COLL VENOUS BLD VENIPUNCTURE: CPT | Mod: PN | Performed by: STUDENT IN AN ORGANIZED HEALTH CARE EDUCATION/TRAINING PROGRAM

## 2023-07-21 PROCEDURE — 99214 OFFICE O/P EST MOD 30 MIN: CPT | Mod: PBBFAC,PN

## 2023-07-21 PROCEDURE — 36415 COLL VENOUS BLD VENIPUNCTURE: CPT | Mod: PN

## 2023-07-21 PROCEDURE — 99999 PR PBB SHADOW E&M-EST. PATIENT-LVL IV: ICD-10-PCS | Mod: PBBFAC,,,

## 2023-07-21 PROCEDURE — 84466 ASSAY OF TRANSFERRIN: CPT

## 2023-07-21 PROCEDURE — 80053 COMPREHEN METABOLIC PANEL: CPT | Mod: PN | Performed by: STUDENT IN AN ORGANIZED HEALTH CARE EDUCATION/TRAINING PROGRAM

## 2023-07-21 PROCEDURE — 99214 PR OFFICE/OUTPT VISIT, EST, LEVL IV, 30-39 MIN: ICD-10-PCS | Mod: S$PBB,,,

## 2023-07-21 PROCEDURE — 85025 COMPLETE CBC W/AUTO DIFF WBC: CPT | Mod: PN | Performed by: STUDENT IN AN ORGANIZED HEALTH CARE EDUCATION/TRAINING PROGRAM

## 2023-07-21 RX ORDER — EPINEPHRINE 0.3 MG/.3ML
0.3 INJECTION SUBCUTANEOUS ONCE AS NEEDED
Status: CANCELLED | OUTPATIENT
Start: 2023-07-24

## 2023-07-21 RX ORDER — SODIUM CHLORIDE 0.9 % (FLUSH) 0.9 %
10 SYRINGE (ML) INJECTION
Status: CANCELLED | OUTPATIENT
Start: 2023-07-24

## 2023-07-21 RX ORDER — FAMOTIDINE 10 MG/ML
20 INJECTION INTRAVENOUS
Status: CANCELLED | OUTPATIENT
Start: 2023-07-24

## 2023-07-21 RX ORDER — HEPARIN 100 UNIT/ML
500 SYRINGE INTRAVENOUS
Status: CANCELLED | OUTPATIENT
Start: 2023-07-24

## 2023-07-21 RX ORDER — DIPHENHYDRAMINE HYDROCHLORIDE 50 MG/ML
50 INJECTION INTRAMUSCULAR; INTRAVENOUS ONCE AS NEEDED
Status: CANCELLED | OUTPATIENT
Start: 2023-07-24

## 2023-07-21 NOTE — PROGRESS NOTES
Subjective:      Chief Complaint:  Postmenopausal bleeding with a thick endometrial stripe.  Recent diagnosis of breast cancer, currently receiving chemotherapy.     Patient ID: Margaret Rouse is a 72 y.o.  female referred for evaluation of postmenopausal bleeding with a thick endometrial stripe.  The patient was diagnosed with breast cancer in 2023 and underwent surgery.  She currently is on weekly Taxol and Herceptin chemotherapy with the plan to continue the Herceptin.  Prior to her breast cancer diagnosis, the patient had been on a stable hormone replacement regimen with pellet estrogen and testosterone since  and oral progestin.  Her last pellet insertion was in 2023.  Her recent pelvic ultrasound reveals a normal size uterus measuring 8.1 x 3.9 x 4.7 cm with a 10 mm abnormally thickened endometrium.  The left ovary is sonographically normal with a small simple cyst in the right ovary was not visualized.  Options have been discussed in detail with the patient and her  and they are agreeable to proceed with a D&C when she has completed her chemotherapy.         Past Medical History:   Diagnosis Date    Abnormal results of liver function studies     Cancer     left breast cancer    Chest pain     Fatigue     H/O: pneumonia     History of chicken pox     Hyperlipidemia, mixed     Hypertension     Menopausal syndrome     Palpitations 12/15/2015    PONV (postoperative nausea and vomiting)     Thyroid disease       Past Surgical History:   Procedure Laterality Date    BREAST RECONSTRUCTION Left 2023    Procedure: RECONSTRUCTION, BREAST;  Surgeon: Pipo Bro MD;  Location: [facility];  Service: Plastics;  Laterality: Left;    CATARACT EXTRACTION W/ INTRAOCULAR LENS  IMPLANT, BILATERAL      COLONOSCOPY      COSMETIC SURGERY      DEBRIDEMENT AND CLOSURE OF WOUND OF FINGER Left 2018    Procedure: DEBRIDEMENT, WOUND, FINGER, WITH CLOSURE, VY FLap;  Surgeon: Frederic Barbour  MD;  Location: [facility] OR;  Service: Plastics;  Laterality: Left;    INSERTION OF BREAST IMPLANT Left 4/20/2023    Procedure: INSERTION, BREAST IMPLANT;  Surgeon: Pipo Bro MD;  Location: [facility] OR;  Service: Plastics;  Laterality: Left;    KNEE SURGERY Right 2015    LEFT HEART CATHETERIZATION Left 11/18/2021    Procedure: CATHETERIZATION, HEART, LEFT;  Surgeon: Basim Castrejon MD;  Location: [facility] CATH;  Service: Cardiology;  Laterality: Left;    PLACEMENT OF ACELLULAR HUMAN DERMAL ALLOGRAFT Left 4/20/2023    Procedure: APPLICATION, ACELLULAR HUMAN DERMAL ALLOGRAFT;  Surgeon: Pipo Bro MD;  Location: [facility] OR;  Service: Plastics;  Laterality: Left;    SENTINEL LYMPH NODE BIOPSY Left 4/20/2023    Procedure: BIOPSY, LYMPH NODE, SENTINEL;  Surgeon: Amanda Mcclellan MD;  Location: [facility] OR;  Service: General;  Laterality: Left;    SIMPLE MASTECTOMY Left 4/20/2023    Procedure: MASTECTOMY, SIMPLE;  Surgeon: Amanda Mcclellan MD;  Location: [facility] OR;  Service: General;  Laterality: Left;    TONSILLECTOMY  1955      Family History   Problem Relation Age of Onset    No Known Problems Mother     Heart disease Father     Heart attack Father         at age 64    Cancer Sister         folicular non-hodgkins lymphoma    Lymphoma Sister     Diabetes Maternal Grandmother       Social History     Tobacco Use    Smoking status: Former    Smokeless tobacco: Never    Tobacco comments:     50 years ago   Substance Use Topics    Alcohol use: No     Comment: rarely    Drug use: No              Objective:        1. General: Well appearing, no apparent distress, alert and oriented.  2. Lymph: Neck symmetric without cervical or supraclavicular adenopathy or mass.  3. Heart:  Regular rate.  4. Lungs: Normal respiratory rate, no accessory muscle use.  5. Psych: Normal affect.  6. Abdomen:  Benign.  7. Skin: Warm, dry, no rashes or lesions.   8. Extremities: Bilateral lower extremities without edema or tenderness.                  Assessment/Plan     Recently diagnosed breast cancer status post surgery and now receiving Taxol and Herceptin chemotherapy, doing very well.    Several year history of hormone replacement therapy with pellet estrogen and testosterone, now with postmenopausal spotting and a recent pelvic ultrasound showing a thick endometrial stripe.    Options have been discussed in detail with the patient and her  regarding follow-up versus hysteroscopy with D&C.  We also discussed the timing for this procedure and the plan will be to proceed after she has completed her planned chemotherapy and when cleared by Dr. Hannah.          Vaginal bleeding  -     Ambulatory referral/consult to Gynecologic Oncology    Endometrial thickening on ultrasound  -     Ambulatory referral/consult to Gynecologic Oncology    Invasive ductal carcinoma of left breast in female  -     Ambulatory referral/consult to Gynecologic Oncology

## 2023-07-22 ENCOUNTER — PATIENT MESSAGE (OUTPATIENT)
Dept: ADMINISTRATIVE | Facility: OTHER | Age: 73
End: 2023-07-22
Payer: MEDICARE

## 2023-07-24 ENCOUNTER — INFUSION (OUTPATIENT)
Dept: INFUSION THERAPY | Facility: HOSPITAL | Age: 73
End: 2023-07-24
Attending: STUDENT IN AN ORGANIZED HEALTH CARE EDUCATION/TRAINING PROGRAM
Payer: MEDICARE

## 2023-07-24 VITALS
BODY MASS INDEX: 24.94 KG/M2 | HEIGHT: 66 IN | DIASTOLIC BLOOD PRESSURE: 75 MMHG | SYSTOLIC BLOOD PRESSURE: 144 MMHG | HEART RATE: 88 BPM | RESPIRATION RATE: 16 BRPM | WEIGHT: 155.19 LBS | TEMPERATURE: 98 F

## 2023-07-24 DIAGNOSIS — C50.912 INVASIVE DUCTAL CARCINOMA OF LEFT BREAST IN FEMALE: ICD-10-CM

## 2023-07-24 DIAGNOSIS — D05.12 DUCTAL CARCINOMA IN SITU (DCIS) OF LEFT BREAST: Primary | ICD-10-CM

## 2023-07-24 PROBLEM — T45.1X5A IMMUNODEFICIENCY DUE TO CHEMOTHERAPY: Status: ACTIVE | Noted: 2023-07-24

## 2023-07-24 PROBLEM — Z79.69 IMMUNODEFICIENCY DUE TO CHEMOTHERAPY: Status: ACTIVE | Noted: 2023-07-24

## 2023-07-24 PROBLEM — Z79.899 IMMUNODEFICIENCY DUE TO CHEMOTHERAPY: Status: ACTIVE | Noted: 2023-07-24

## 2023-07-24 PROBLEM — D84.821 IMMUNODEFICIENCY DUE TO CHEMOTHERAPY: Status: ACTIVE | Noted: 2023-07-24

## 2023-07-24 PROBLEM — R93.89 ENDOMETRIAL THICKENING ON ULTRASOUND: Status: ACTIVE | Noted: 2023-07-24

## 2023-07-24 PROCEDURE — 63600175 PHARM REV CODE 636 W HCPCS: Mod: PN

## 2023-07-24 PROCEDURE — 25000003 PHARM REV CODE 250: Mod: PN

## 2023-07-24 PROCEDURE — 96413 CHEMO IV INFUSION 1 HR: CPT | Mod: PN

## 2023-07-24 PROCEDURE — 96417 CHEMO IV INFUS EACH ADDL SEQ: CPT | Mod: PN

## 2023-07-24 PROCEDURE — 96375 TX/PRO/DX INJ NEW DRUG ADDON: CPT | Mod: PN

## 2023-07-24 PROCEDURE — 96367 TX/PROPH/DG ADDL SEQ IV INF: CPT | Mod: PN

## 2023-07-24 PROCEDURE — 63600175 PHARM REV CODE 636 W HCPCS: Mod: PN | Performed by: STUDENT IN AN ORGANIZED HEALTH CARE EDUCATION/TRAINING PROGRAM

## 2023-07-24 RX ORDER — EPINEPHRINE 0.3 MG/.3ML
0.3 INJECTION SUBCUTANEOUS ONCE AS NEEDED
Status: DISCONTINUED | OUTPATIENT
Start: 2023-07-24 | End: 2023-07-24 | Stop reason: HOSPADM

## 2023-07-24 RX ORDER — SODIUM CHLORIDE 0.9 % (FLUSH) 0.9 %
10 SYRINGE (ML) INJECTION
Status: DISCONTINUED | OUTPATIENT
Start: 2023-07-24 | End: 2023-07-24 | Stop reason: HOSPADM

## 2023-07-24 RX ORDER — DIPHENHYDRAMINE HYDROCHLORIDE 50 MG/ML
50 INJECTION INTRAMUSCULAR; INTRAVENOUS ONCE AS NEEDED
Status: DISCONTINUED | OUTPATIENT
Start: 2023-07-24 | End: 2023-07-24 | Stop reason: HOSPADM

## 2023-07-24 RX ORDER — DEXAMETHASONE SODIUM PHOSPHATE 4 MG/ML
4 INJECTION, SOLUTION INTRA-ARTICULAR; INTRALESIONAL; INTRAMUSCULAR; INTRAVENOUS; SOFT TISSUE ONCE
Status: COMPLETED | OUTPATIENT
Start: 2023-07-24 | End: 2023-07-24

## 2023-07-24 RX ORDER — FAMOTIDINE 10 MG/ML
20 INJECTION INTRAVENOUS
Status: COMPLETED | OUTPATIENT
Start: 2023-07-24 | End: 2023-07-24

## 2023-07-24 RX ADMIN — FAMOTIDINE 20 MG: 10 INJECTION INTRAVENOUS at 01:07

## 2023-07-24 RX ADMIN — DIPHENHYDRAMINE HYDROCHLORIDE 25 MG: 50 INJECTION, SOLUTION INTRAMUSCULAR; INTRAVENOUS at 01:07

## 2023-07-24 RX ADMIN — PACLITAXEL 144 MG: 6 INJECTION, SOLUTION, CONCENTRATE INTRAVENOUS at 01:07

## 2023-07-24 RX ADMIN — TRASTUZUMAB 136 MG: 150 INJECTION, POWDER, LYOPHILIZED, FOR SOLUTION INTRAVENOUS at 12:07

## 2023-07-24 RX ADMIN — DEXAMETHASONE SODIUM PHOSPHATE 4 MG: 4 INJECTION, SOLUTION INTRA-ARTICULAR; INTRALESIONAL; INTRAMUSCULAR; INTRAVENOUS; SOFT TISSUE at 01:07

## 2023-07-24 NOTE — PLAN OF CARE
Problem: Adult Inpatient Plan of Care  Goal: Plan of Care Review  Outcome: Ongoing, Progressing  Flowsheets (Taken 7/24/2023 1211)  Plan of Care Reviewed With:   patient   spouse  Goal: Patient-Specific Goal (Individualized)  Outcome: Ongoing, Progressing  Flowsheets (Taken 7/24/2023 1211)  Anxieties, Fears or Concerns: None  Individualized Care Needs: Recliner,warm blanket, pillow, dimmed lights, conversation,  at chairside.     Problem: Fatigue (Oncology Care)  Goal: Improved Activity Tolerance  Outcome: Ongoing, Progressing  Intervention: Promote Improved Energy  Flowsheets (Taken 7/24/2023 1211)  Fatigue Management:   frequent rest breaks encouraged   paced activity encouraged  Sleep/Rest Enhancement: regular sleep/rest pattern promoted  Activity Management: Ambulated -L4    Patient to Infusion for Ogivri and Taxol, accompanied by her . Treatment plan reviewed with patient. VSS. Tolerated infusion. Provided with copy of upcoming appointment schedule. Escorted to the front lobby in no distress for discharge to home.

## 2023-07-28 ENCOUNTER — LAB VISIT (OUTPATIENT)
Dept: LAB | Facility: HOSPITAL | Age: 73
End: 2023-07-28
Attending: STUDENT IN AN ORGANIZED HEALTH CARE EDUCATION/TRAINING PROGRAM
Payer: MEDICARE

## 2023-07-28 ENCOUNTER — OFFICE VISIT (OUTPATIENT)
Dept: HEMATOLOGY/ONCOLOGY | Facility: CLINIC | Age: 73
End: 2023-07-28
Payer: MEDICARE

## 2023-07-28 ENCOUNTER — PATIENT MESSAGE (OUTPATIENT)
Dept: ADMINISTRATIVE | Facility: OTHER | Age: 73
End: 2023-07-28
Payer: MEDICARE

## 2023-07-28 VITALS
RESPIRATION RATE: 16 BRPM | BODY MASS INDEX: 24.63 KG/M2 | OXYGEN SATURATION: 97 % | DIASTOLIC BLOOD PRESSURE: 78 MMHG | WEIGHT: 153.25 LBS | TEMPERATURE: 98 F | SYSTOLIC BLOOD PRESSURE: 132 MMHG | HEIGHT: 66 IN | HEART RATE: 76 BPM

## 2023-07-28 DIAGNOSIS — D84.821 IMMUNODEFICIENCY DUE TO CHEMOTHERAPY: ICD-10-CM

## 2023-07-28 DIAGNOSIS — C50.912 INVASIVE DUCTAL CARCINOMA OF LEFT BREAST IN FEMALE: ICD-10-CM

## 2023-07-28 DIAGNOSIS — N93.9 VAGINAL BLEEDING: ICD-10-CM

## 2023-07-28 DIAGNOSIS — D64.81 ANEMIA ASSOCIATED WITH CHEMOTHERAPY: ICD-10-CM

## 2023-07-28 DIAGNOSIS — T45.1X5A IMMUNODEFICIENCY DUE TO CHEMOTHERAPY: ICD-10-CM

## 2023-07-28 DIAGNOSIS — T45.1X5A ANEMIA ASSOCIATED WITH CHEMOTHERAPY: ICD-10-CM

## 2023-07-28 DIAGNOSIS — C50.912 INVASIVE DUCTAL CARCINOMA OF LEFT BREAST IN FEMALE: Primary | ICD-10-CM

## 2023-07-28 DIAGNOSIS — K12.31 MUCOSITIS DUE TO CHEMOTHERAPY: ICD-10-CM

## 2023-07-28 DIAGNOSIS — Z79.899 IMMUNODEFICIENCY DUE TO CHEMOTHERAPY: ICD-10-CM

## 2023-07-28 LAB
ALBUMIN SERPL BCP-MCNC: 3.9 G/DL (ref 3.5–5.2)
ALP SERPL-CCNC: 45 U/L (ref 55–135)
ALT SERPL W/O P-5'-P-CCNC: 26 U/L (ref 10–44)
ANION GAP SERPL CALC-SCNC: 9 MMOL/L (ref 8–16)
AST SERPL-CCNC: 22 U/L (ref 10–40)
BASOPHILS # BLD AUTO: 0.02 K/UL (ref 0–0.2)
BASOPHILS NFR BLD: 0.5 % (ref 0–1.9)
BILIRUB SERPL-MCNC: 0.6 MG/DL (ref 0.1–1)
BUN SERPL-MCNC: 18 MG/DL (ref 8–23)
CALCIUM SERPL-MCNC: 9.4 MG/DL (ref 8.7–10.5)
CHLORIDE SERPL-SCNC: 107 MMOL/L (ref 95–110)
CO2 SERPL-SCNC: 23 MMOL/L (ref 23–29)
CREAT SERPL-MCNC: 0.9 MG/DL (ref 0.5–1.4)
DIFFERENTIAL METHOD: ABNORMAL
EOSINOPHIL # BLD AUTO: 0.1 K/UL (ref 0–0.5)
EOSINOPHIL NFR BLD: 1.4 % (ref 0–8)
ERYTHROCYTE [DISTWIDTH] IN BLOOD BY AUTOMATED COUNT: 14 % (ref 11.5–14.5)
EST. GFR  (NO RACE VARIABLE): >60 ML/MIN/1.73 M^2
GLUCOSE SERPL-MCNC: 97 MG/DL (ref 70–110)
HCT VFR BLD AUTO: 33.4 % (ref 37–48.5)
HGB BLD-MCNC: 11.1 G/DL (ref 12–16)
IMM GRANULOCYTES # BLD AUTO: 0.02 K/UL (ref 0–0.04)
IMM GRANULOCYTES NFR BLD AUTO: 0.5 % (ref 0–0.5)
LYMPHOCYTES # BLD AUTO: 1.5 K/UL (ref 1–4.8)
LYMPHOCYTES NFR BLD: 33.6 % (ref 18–48)
MAGNESIUM SERPL-MCNC: 1.8 MG/DL (ref 1.6–2.6)
MCH RBC QN AUTO: 31.6 PG (ref 27–31)
MCHC RBC AUTO-ENTMCNC: 33.2 G/DL (ref 32–36)
MCV RBC AUTO: 95 FL (ref 82–98)
MONOCYTES # BLD AUTO: 0.2 K/UL (ref 0.3–1)
MONOCYTES NFR BLD: 3.7 % (ref 4–15)
NEUTROPHILS # BLD AUTO: 2.6 K/UL (ref 1.8–7.7)
NEUTROPHILS NFR BLD: 60.3 % (ref 38–73)
NRBC BLD-RTO: 0 /100 WBC
PLATELET # BLD AUTO: 208 K/UL (ref 150–450)
PMV BLD AUTO: 9.2 FL (ref 9.2–12.9)
POTASSIUM SERPL-SCNC: 4.7 MMOL/L (ref 3.5–5.1)
PROT SERPL-MCNC: 6.6 G/DL (ref 6–8.4)
RBC # BLD AUTO: 3.51 M/UL (ref 4–5.4)
SODIUM SERPL-SCNC: 139 MMOL/L (ref 136–145)
WBC # BLD AUTO: 4.37 K/UL (ref 3.9–12.7)

## 2023-07-28 PROCEDURE — 80053 COMPREHEN METABOLIC PANEL: CPT | Mod: PN | Performed by: STUDENT IN AN ORGANIZED HEALTH CARE EDUCATION/TRAINING PROGRAM

## 2023-07-28 PROCEDURE — 99215 OFFICE O/P EST HI 40 MIN: CPT | Mod: S$PBB,,, | Performed by: STUDENT IN AN ORGANIZED HEALTH CARE EDUCATION/TRAINING PROGRAM

## 2023-07-28 PROCEDURE — 99999 PR PBB SHADOW E&M-EST. PATIENT-LVL IV: CPT | Mod: PBBFAC,,, | Performed by: STUDENT IN AN ORGANIZED HEALTH CARE EDUCATION/TRAINING PROGRAM

## 2023-07-28 PROCEDURE — 83735 ASSAY OF MAGNESIUM: CPT | Mod: PN | Performed by: STUDENT IN AN ORGANIZED HEALTH CARE EDUCATION/TRAINING PROGRAM

## 2023-07-28 PROCEDURE — 85025 COMPLETE CBC W/AUTO DIFF WBC: CPT | Mod: PN | Performed by: STUDENT IN AN ORGANIZED HEALTH CARE EDUCATION/TRAINING PROGRAM

## 2023-07-28 PROCEDURE — 99214 OFFICE O/P EST MOD 30 MIN: CPT | Mod: PBBFAC,PN | Performed by: STUDENT IN AN ORGANIZED HEALTH CARE EDUCATION/TRAINING PROGRAM

## 2023-07-28 PROCEDURE — 99999 PR PBB SHADOW E&M-EST. PATIENT-LVL IV: ICD-10-PCS | Mod: PBBFAC,,, | Performed by: STUDENT IN AN ORGANIZED HEALTH CARE EDUCATION/TRAINING PROGRAM

## 2023-07-28 PROCEDURE — 99215 PR OFFICE/OUTPT VISIT, EST, LEVL V, 40-54 MIN: ICD-10-PCS | Mod: S$PBB,,, | Performed by: STUDENT IN AN ORGANIZED HEALTH CARE EDUCATION/TRAINING PROGRAM

## 2023-07-28 PROCEDURE — 36415 COLL VENOUS BLD VENIPUNCTURE: CPT | Mod: PN | Performed by: STUDENT IN AN ORGANIZED HEALTH CARE EDUCATION/TRAINING PROGRAM

## 2023-07-28 RX ORDER — EPINEPHRINE 0.3 MG/.3ML
0.3 INJECTION SUBCUTANEOUS ONCE AS NEEDED
Status: CANCELLED | OUTPATIENT
Start: 2023-07-31

## 2023-07-28 RX ORDER — FAMOTIDINE 10 MG/ML
20 INJECTION INTRAVENOUS
Status: CANCELLED | OUTPATIENT
Start: 2023-07-31

## 2023-07-28 RX ORDER — HEPARIN 100 UNIT/ML
500 SYRINGE INTRAVENOUS
Status: CANCELLED | OUTPATIENT
Start: 2023-07-31

## 2023-07-28 RX ORDER — SODIUM CHLORIDE 0.9 % (FLUSH) 0.9 %
10 SYRINGE (ML) INJECTION
Status: CANCELLED | OUTPATIENT
Start: 2023-07-31

## 2023-07-28 RX ORDER — DIPHENHYDRAMINE HYDROCHLORIDE 50 MG/ML
50 INJECTION INTRAMUSCULAR; INTRAVENOUS ONCE AS NEEDED
Status: CANCELLED | OUTPATIENT
Start: 2023-07-31

## 2023-07-28 RX ORDER — DEXAMETHASONE SODIUM PHOSPHATE 4 MG/ML
4 INJECTION, SOLUTION INTRA-ARTICULAR; INTRALESIONAL; INTRAMUSCULAR; INTRAVENOUS; SOFT TISSUE
Status: CANCELLED
Start: 2023-07-31

## 2023-07-28 NOTE — PROGRESS NOTES
"  PATIENT: Margaret Rouse  MRN: [id number]  DATE: 8/6/2023    Diagnosis:   1. Invasive ductal carcinoma of left breast in female    2. Immunodeficiency due to chemotherapy    3. Vaginal bleeding    4. Anemia associated with chemotherapy    5. Mucositis due to chemotherapy        Chief Complaint: Invasive ductal carcinoma of left breast in female (Follow up with labs)    Subjective:   HPI: Ms. Rouse is a 72 y.o. female with invasive ductal carcinoma of the left breast who is here today for follow up and consideration of C10D1 Taxol/Ogivri on 6/26/23.    She began treatment with Paclitaxel and Trastuzumab on 5/22/23.     Vaginal bleeding has improved, mild spotting now. Seems to be occurring monthly.   Underwent endometrial biopsy 6/7/2023 shows "overall appearance of this endometrium is suggestive of reparative changes." an james with Dr Morrow   Denies IZQUIERDO, CP, SOB, cough, abd pain, diarrhea, constipation, dysuria, swelling, new lumps or bumps, rashes. Dneies fevers, chills.     Oncology History:   2/23/23 Seen for a newly diagnosed stage IA IDC of the left breast. MRI Breasts: clumped non-mass enhancement with associated clip 3.1 x 1.2 x 2.9cm with sub-cm satellites inferiorly up to 6mm at 1.6cm inferior.  No abnormal SARAH. Biopsy positive for IDC/DCIS, ER; low positive, NV: negative, no HER-2 since was insufficient invasive tumor for IDC and was run on the DCIS part.    5/11/23 ECHO with EF 65%  Oncology History   Invasive ductal carcinoma of left breast in female   2/27/2023 Initial Diagnosis    Invasive ductal carcinoma of left breast in female     2/27/2023 Cancer Staged    Staging form: Breast, AJCC 8th Edition  - Clinical stage from 2/27/2023: cT1mi, cN0, cM0, G3, ER+, NV-, HER2: Not Assessed     5/1/2023 Cancer Staged    Staging form: Breast, AJCC 8th Edition  - Pathologic stage from 5/1/2023: Stage IA (pT1a, pN0(sn), cM0, G2, ER-, NV-, HER2+)     5/22/2023 -  Chemotherapy    Treatment Summary   Plan Name: " OP BREAST TRASTUZUMAB PACLITAXEL WEEKLY  Treatment Goal: Curative  Status: Active  Start Date: 5/22/2023  End Date: 4/22/2024 (Planned)  Provider: Alexandra Hannah MD  Chemotherapy: PACLitaxeL (TAXOL) 80 mg/m2 = 144 mg in sodium chloride 0.9% 250 mL chemo infusion, 80 mg/m2 = 144 mg, Intravenous, Clinic/HOD 1 time, 11 of 12 cycles  Administration: 144 mg (5/22/2023), 144 mg (5/29/2023), 144 mg (6/5/2023), 144 mg (6/12/2023), 144 mg (6/19/2023), 144 mg (6/26/2023), 144 mg (7/3/2023), 144 mg (7/10/2023), 144 mg (7/17/2023), 144 mg (7/24/2023), 144 mg (7/31/2023)  trastuzumab-dkst (OGIVRI) 278 mg in sodium chloride 0.9% 298.2 mL chemo infusion, 4 mg/kg = 278 mg, Intravenous, Clinic/HOD 1 time, 11 of 25 cycles  Administration: 278 mg (5/22/2023), 139 mg (5/29/2023), 136 mg (6/5/2023), 136 mg (6/12/2023), 136 mg (6/19/2023), 136 mg (6/26/2023), 136 mg (7/3/2023), 136 mg (7/10/2023), 136 mg (7/17/2023), 136 mg (7/24/2023), 139 mg (7/31/2023)     Ductal carcinoma in situ (DCIS) of left breast   3/5/2023 Initial Diagnosis    Ductal carcinoma in situ (DCIS) of left breast     5/22/2023 -  Chemotherapy    Treatment Summary   Plan Name: OP BREAST TRASTUZUMAB PACLITAXEL WEEKLY  Treatment Goal: Curative  Status: Active  Start Date: 5/22/2023  End Date: 4/22/2024 (Planned)  Provider: Alexandra Hannah MD  Chemotherapy: PACLitaxeL (TAXOL) 80 mg/m2 = 144 mg in sodium chloride 0.9% 250 mL chemo infusion, 80 mg/m2 = 144 mg, Intravenous, Clinic/HOD 1 time, 11 of 12 cycles  Administration: 144 mg (5/22/2023), 144 mg (5/29/2023), 144 mg (6/5/2023), 144 mg (6/12/2023), 144 mg (6/19/2023), 144 mg (6/26/2023), 144 mg (7/3/2023), 144 mg (7/10/2023), 144 mg (7/17/2023), 144 mg (7/24/2023), 144 mg (7/31/2023)  trastuzumab-dkst (OGIVRI) 278 mg in sodium chloride 0.9% 298.2 mL chemo infusion, 4 mg/kg = 278 mg, Intravenous, [facility] 1 time, 11 of 25 cycles  Administration: 278 mg (5/22/2023), 139 mg (5/29/2023), 136 mg (6/5/2023), 136 mg (6/12/2023),  136 mg (6/19/2023), 136 mg (6/26/2023), 136 mg (7/3/2023), 136 mg (7/10/2023), 136 mg (7/17/2023), 136 mg (7/24/2023), 139 mg (7/31/2023)        Past Medical History:   Past Medical History:   Diagnosis Date    Abnormal results of liver function studies     Cancer     left breast cancer    Chest pain     Fatigue     H/O: pneumonia     History of chicken pox     Hyperlipidemia, mixed     Hypertension     Menopausal syndrome     Palpitations 12/15/2015    PONV (postoperative nausea and vomiting)     Thyroid disease        Past Surgical HIstory:   Past Surgical History:   Procedure Laterality Date    BREAST RECONSTRUCTION Left 4/20/2023    Procedure: RECONSTRUCTION, BREAST;  Surgeon: Pipo Bro MD;  Location: [facility] OR;  Service: Plastics;  Laterality: Left;    CATARACT EXTRACTION W/ INTRAOCULAR LENS  IMPLANT, BILATERAL      COLONOSCOPY      COSMETIC SURGERY      DEBRIDEMENT AND CLOSURE OF WOUND OF FINGER Left 11/29/2018    Procedure: DEBRIDEMENT, WOUND, FINGER, WITH CLOSURE, VY FLap;  Surgeon: Frederic Barbour MD;  Location: [facility] OR;  Service: Plastics;  Laterality: Left;    INSERTION OF BREAST IMPLANT Left 4/20/2023    Procedure: INSERTION, BREAST IMPLANT;  Surgeon: Pipo Bro MD;  Location: [facility] OR;  Service: Plastics;  Laterality: Left;    KNEE SURGERY Right 2015    LEFT HEART CATHETERIZATION Left 11/18/2021    Procedure: CATHETERIZATION, HEART, LEFT;  Surgeon: Basim Castrejon MD;  Location: [facility] CATH;  Service: Cardiology;  Laterality: Left;    PLACEMENT OF ACELLULAR HUMAN DERMAL ALLOGRAFT Left 4/20/2023    Procedure: APPLICATION, ACELLULAR HUMAN DERMAL ALLOGRAFT;  Surgeon: Pipo Bro MD;  Location: [facility] OR;  Service: Plastics;  Laterality: Left;    SENTINEL LYMPH NODE BIOPSY Left 4/20/2023    Procedure: BIOPSY, LYMPH NODE, SENTINEL;  Surgeon: Amanda Mcclellan MD;  Location: [facility] OR;  Service: General;  Laterality: Left;    SIMPLE MASTECTOMY Left 4/20/2023    Procedure: MASTECTOMY, SIMPLE;  Surgeon:  Amanda Mcclellan MD;  Location: [facility];  Service: General;  Laterality: Left;    TONSILLECTOMY  1955       Family History:   Family History   Problem Relation Age of Onset    No Known Problems Mother     Heart disease Father     Heart attack Father         at age 64    Cancer Sister         folicular non-hodgkins lymphoma    Lymphoma Sister     Diabetes Maternal Grandmother        Social History:  reports that she has quit smoking. She has never used smokeless tobacco. She reports that she does not drink alcohol and does not use drugs.    Allergies:  Review of patient's allergies indicates:  No Known Allergies    Medications:  Current Outpatient Medications   Medication Sig Dispense Refill    ARMOUR THYROID 30 mg Tab once daily.      cyanocobalamin, vitamin B-12, 3,000 mcg Cap Take by mouth once daily. Triple Blend with folate 680mcg      duke's soln (benadryl 30 mL, mylanta 30 mL, LIDOcaine 30 mL, nystatin 30 mL) 120mL Take 10 mLs by mouth 4 (four) times daily. 120 mL 0    fluticasone propionate (FLONASE) 50 mcg/actuation nasal spray 1 spray by Each Nostril route once daily.      KRILL OIL ORAL Take 2,000 mg by mouth once daily.      losartan (COZAAR) 25 MG tablet once daily.      magnesium 200 mg Tab Take 400 mg by mouth once daily.      metFORMIN (GLUCOPHAGE) 500 MG tablet Take 500 mg by mouth 2 (two) times daily.      mupirocin (BACTROBAN) 2 % ointment SMARTSIG:Both Nares      ondansetron (ZOFRAN-ODT) 4 MG TbDL Take 1 tablet (4 mg total) by mouth every 8 (eight) hours as needed (nausea). 12 tablet 0    promethazine (PHENERGAN) 25 MG tablet Take 1 tablet (25 mg total) by mouth every 6 (six) hours as needed for Nausea. 30 tablet 0    rosuvastatin (CRESTOR) 20 MG tablet Take 20 mg by mouth once daily.      traZODone (DESYREL) 50 MG tablet Take 1 tablet (50 mg total) by mouth nightly. 30 tablet 2    UNABLE TO FIND Take 1,000 mg by mouth once daily. medication name: dried liver      VITAMIN A ORAL Take 5,000  "Int'l Units/L by mouth once daily.      vitamin K2 45 mcg Cap Take by mouth once daily.      zinc gluconate 50 mg tablet Take 25 mg by mouth once daily.       No current facility-administered medications for this visit.     Facility-Administered Medications Ordered in Other Visits   Medication Dose Route Frequency Provider Last Rate Last Admin    0.9%  NaCl infusion   Intravenous Continuous Marissa Lei, NP           Review of Systems   Constitutional:  Negative for appetite change and unexpected weight change.   HENT:  Negative for mouth sores.    Eyes:  Negative for visual disturbance.   Respiratory:  Negative for cough and shortness of breath.    Cardiovascular:  Negative for chest pain.   Gastrointestinal:  Negative for abdominal pain and diarrhea.   Genitourinary:  Positive for vaginal bleeding. Negative for difficulty urinating, dysuria, flank pain, frequency, hematuria and vaginal discharge.   Musculoskeletal:  Negative for back pain.   Skin:  Negative for rash.   Neurological:  Negative for headaches.   Hematological:  Negative for adenopathy.   Psychiatric/Behavioral:  The patient is not nervous/anxious.        ECOG Performance Status:   ECOG SCORE             Objective:      Vitals:   Vitals:    07/28/23 1438   BP: 132/78   BP Location: Right arm   Patient Position: Sitting   BP Method: Medium (Manual)   Pulse: 76   Resp: 16   Temp: 97.7 °F (36.5 °C)   TempSrc: Temporal   SpO2: 97%   Weight: 69.5 kg (153 lb 3.5 oz)   Height: 5' 6" (1.676 m)             BMI: Body mass index is 24.73 kg/m².    Physical Exam  Vitals reviewed.   Constitutional:       General: She is not in acute distress.     Appearance: She is not diaphoretic.   HENT:      Head: Normocephalic and atraumatic.   Eyes:      General: No scleral icterus.  Cardiovascular:      Rate and Rhythm: Normal rate and regular rhythm.      Heart sounds: Normal heart sounds. No murmur heard.  Pulmonary:      Effort: Pulmonary effort is normal. No " "respiratory distress.   Abdominal:      General: Abdomen is flat. Bowel sounds are normal. There is no distension.      Palpations: Abdomen is soft.      Tenderness: There is no abdominal tenderness. There is no right CVA tenderness, left CVA tenderness or guarding.   Musculoskeletal:      Right lower leg: No edema.      Left lower leg: No edema.   Lymphadenopathy:      Cervical: No cervical adenopathy.   Skin:     Coloration: Skin is not jaundiced.      Findings: No rash.   Neurological:      Mental Status: She is alert and oriented to person, place, and time.   Psychiatric:         Mood and Affect: Mood normal.         Behavior: Behavior normal.         Laboratory Data:  Lab Results   Component Value Date    WBC 4.07 08/04/2023    HGB 11.0 (L) 08/04/2023    HCT 32.7 (L) 08/04/2023    MCV 97 08/04/2023     08/04/2023        Assessment:       1. Invasive ductal carcinoma of left breast in female    2. Immunodeficiency due to chemotherapy    3. Vaginal bleeding    4. Anemia associated with chemotherapy    5. Mucositis due to chemotherapy             Plan:   Invasive ductal carcinoma of the left breast  -no indication for neoadjuvant chemo, no indication for further imaging, no indication for oncotype especially with possible T1a or T1b IDC and age >70's   -ER: 5.4%+, FL: negative, HER-2 non assess initially then HER-2  +after IDC  was found  -significant discussion about adjuvant chemo vs endocrine but given her low ER/FL positivity, HER-2 therapy might be her only way for preventing this cancer from coming back  -Last DEXA scan  in 2016 showed osteopenia   -decision to move forward with TH; began 5/22/23  -Proceed with C11D1 TH    Vaginal bleeding  -Most likely due to hormonal changes  -Underwent endometrial biopsy 6/7/2023 shows "overall appearance of this endometrium is suggestive of reparative changes."   -Improving, continue to follow with GYN, Dr. Valladares, an james with Dr Morrow  , will wait on D&C after " finishing up chemo (cycle # 12)     Immunodeficiency due to drug/chemotherapy  -Patient at risk for infection   -No current signs/symptoms of infection  -Continue to monitor closely while on therapy       40 minutes were spent in coordination of patient's care, record review and counseling.    Route Chart for Scheduling    Med Onc Chart Routing      Follow up with physician . Keep james as they are   Follow up with JAMES    Infusion scheduling note    Injection scheduling note    Labs    Imaging    Pharmacy appointment    Other referrals            Treatment Plan Information   OP BREAST TRASTUZUMAB PACLITAXEL WEEKLY   Alexandra Hannah MD   Upcoming Treatment Dates - OP BREAST TRASTUZUMAB PACLITAXEL WEEKLY    8/7/2023       Pre-Medications       diphenhydrAMINE (BENADRYL) 25 mg in NS 50 mL IVPB       dexAMETHasone injection 4 mg       famotidine (PF) injection 20 mg       Chemotherapy       trastuzumab-dkst (OGIVRI) 139 mg in sodium chloride 0.9% 250 mL chemo infusion       PACLitaxeL (TAXOL) 80 mg/m2 = 144 mg in sodium chloride 0.9% 250 mL chemo infusion  8/14/2023       Chemotherapy       trastuzumab-dkst (OGIVRI) 417 mg in sodium chloride 0.9% 250 mL chemo infusion  9/4/2023       Chemotherapy       trastuzumab-dkst (OGIVRI) 417 mg in sodium chloride 0.9% 250 mL chemo infusion  9/25/2023       Chemotherapy       trastuzumab-dkst (OGIVRI) 417 mg in sodium chloride 0.9% 250 mL chemo infusion    Alexandar Hannah MD  Hematology and Oncology  [facility]  [facility] [place] [facility]

## 2023-07-29 ENCOUNTER — PATIENT MESSAGE (OUTPATIENT)
Dept: ADMINISTRATIVE | Facility: OTHER | Age: 73
End: 2023-07-29
Payer: MEDICARE

## 2023-07-31 ENCOUNTER — INFUSION (OUTPATIENT)
Dept: INFUSION THERAPY | Facility: HOSPITAL | Age: 73
End: 2023-07-31
Attending: STUDENT IN AN ORGANIZED HEALTH CARE EDUCATION/TRAINING PROGRAM
Payer: MEDICARE

## 2023-07-31 VITALS
DIASTOLIC BLOOD PRESSURE: 65 MMHG | WEIGHT: 153.25 LBS | HEART RATE: 91 BPM | TEMPERATURE: 98 F | SYSTOLIC BLOOD PRESSURE: 122 MMHG | BODY MASS INDEX: 24.63 KG/M2 | HEIGHT: 66 IN | RESPIRATION RATE: 18 BRPM

## 2023-07-31 DIAGNOSIS — D05.12 DUCTAL CARCINOMA IN SITU (DCIS) OF LEFT BREAST: Primary | ICD-10-CM

## 2023-07-31 DIAGNOSIS — C50.912 INVASIVE DUCTAL CARCINOMA OF LEFT BREAST IN FEMALE: ICD-10-CM

## 2023-07-31 PROCEDURE — 96375 TX/PRO/DX INJ NEW DRUG ADDON: CPT | Mod: PN

## 2023-07-31 PROCEDURE — 96413 CHEMO IV INFUSION 1 HR: CPT | Mod: PN

## 2023-07-31 PROCEDURE — 63600175 PHARM REV CODE 636 W HCPCS: Mod: PN | Performed by: STUDENT IN AN ORGANIZED HEALTH CARE EDUCATION/TRAINING PROGRAM

## 2023-07-31 PROCEDURE — 96417 CHEMO IV INFUS EACH ADDL SEQ: CPT | Mod: PN

## 2023-07-31 PROCEDURE — 96367 TX/PROPH/DG ADDL SEQ IV INF: CPT | Mod: PN

## 2023-07-31 PROCEDURE — 25000003 PHARM REV CODE 250: Mod: PN | Performed by: STUDENT IN AN ORGANIZED HEALTH CARE EDUCATION/TRAINING PROGRAM

## 2023-07-31 RX ORDER — DEXAMETHASONE SODIUM PHOSPHATE 4 MG/ML
4 INJECTION, SOLUTION INTRA-ARTICULAR; INTRALESIONAL; INTRAMUSCULAR; INTRAVENOUS; SOFT TISSUE
Status: COMPLETED | OUTPATIENT
Start: 2023-07-31 | End: 2023-07-31

## 2023-07-31 RX ORDER — DIPHENHYDRAMINE HYDROCHLORIDE 50 MG/ML
50 INJECTION INTRAMUSCULAR; INTRAVENOUS ONCE AS NEEDED
Status: DISCONTINUED | OUTPATIENT
Start: 2023-07-31 | End: 2023-07-31 | Stop reason: HOSPADM

## 2023-07-31 RX ORDER — FAMOTIDINE 10 MG/ML
20 INJECTION INTRAVENOUS
Status: COMPLETED | OUTPATIENT
Start: 2023-07-31 | End: 2023-07-31

## 2023-07-31 RX ORDER — EPINEPHRINE 0.3 MG/.3ML
0.3 INJECTION SUBCUTANEOUS ONCE AS NEEDED
Status: DISCONTINUED | OUTPATIENT
Start: 2023-07-31 | End: 2023-07-31 | Stop reason: HOSPADM

## 2023-07-31 RX ORDER — SODIUM CHLORIDE 0.9 % (FLUSH) 0.9 %
10 SYRINGE (ML) INJECTION
Status: DISCONTINUED | OUTPATIENT
Start: 2023-07-31 | End: 2023-07-31 | Stop reason: HOSPADM

## 2023-07-31 RX ORDER — HEPARIN 100 UNIT/ML
500 SYRINGE INTRAVENOUS
Status: DISCONTINUED | OUTPATIENT
Start: 2023-07-31 | End: 2023-07-31 | Stop reason: HOSPADM

## 2023-07-31 RX ADMIN — PACLITAXEL 144 MG: 6 INJECTION, SOLUTION, CONCENTRATE INTRAVENOUS at 02:07

## 2023-07-31 RX ADMIN — TRASTUZUMAB 139 MG: 150 INJECTION, POWDER, LYOPHILIZED, FOR SOLUTION INTRAVENOUS at 12:07

## 2023-07-31 RX ADMIN — DEXAMETHASONE SODIUM PHOSPHATE 4 MG: 4 INJECTION, SOLUTION INTRA-ARTICULAR; INTRALESIONAL; INTRAMUSCULAR; INTRAVENOUS; SOFT TISSUE at 01:07

## 2023-07-31 RX ADMIN — SODIUM CHLORIDE: 9 INJECTION, SOLUTION INTRAVENOUS at 11:07

## 2023-07-31 RX ADMIN — DIPHENHYDRAMINE HYDROCHLORIDE 25 MG: 50 INJECTION, SOLUTION INTRAMUSCULAR; INTRAVENOUS at 01:07

## 2023-07-31 RX ADMIN — FAMOTIDINE 20 MG: 10 INJECTION INTRAVENOUS at 01:07

## 2023-07-31 NOTE — PLAN OF CARE
Problem: Adult Inpatient Plan of Care  Goal: Plan of Care Review  Outcome: Ongoing, Progressing  Flowsheets (Taken 7/31/2023 1150)  Plan of Care Reviewed With: patient  Goal: Patient-Specific Goal (Individualized)  Flowsheets (Taken 7/31/2023 1150)  Anxieties, Fears or Concerns: none  Individualized Care Needs: recliner, dimmed lights, window view     Problem: Fatigue (Oncology Care)  Goal: Improved Activity Tolerance  Intervention: Promote Improved Energy  Flowsheets (Taken 7/31/2023 1150)  Fatigue Management: paced activity encouraged  Sleep/Rest Enhancement:   natural light exposure provided   noise level reduced   family presence promoted  Activity Management:   Ambulated -L4   Ambulated to bathroom - L4   Up in chair - L3     Pt tolerated ogivri and taxol, VSS. Pt voiced no new complaints or concerns at this time. NAD noted. Pt d/c home.

## 2023-08-03 NOTE — PROGRESS NOTES
PATIENT: Margaret Rouse  MRN: [id number]  DATE: 8/4/2023    Diagnosis:   1. Invasive ductal carcinoma of left breast in female    2. Vaginal bleeding    3. Immunodeficiency due to chemotherapy    4. Anemia associated with chemotherapy    5. Mucositis due to chemotherapy      Chief Complaint: Invasive ductal carcinoma of left breast in female (Follow up with labs )    Subjective:   HPI: Ms. Rouse is a 72 y.o. female with invasive ductal carcinoma of the left breast who is here today for follow up and consideration of C12D1 Taxol/Ogivri on 8/7/23. She is accompanied by her  today.     She began treatment with Paclitaxel and Trastuzumab on 5/22/23.   Vaginal bleeding has improved, mild spotting now.   Denies HA, CP, SOB, cough, abd pain, diarrhea, constipation, dysuria, swelling, new lumps or bumps, rashes. Dneies fevers, chills.     Oncology History:   2/23/23 Seen for a newly diagnosed stage IA IDC of the left breast. MRI Breasts: clumped non-mass enhancement with associated clip 3.1 x 1.2 x 2.9cm with sub-cm satellites inferiorly up to 6mm at 1.6cm inferior.  No abnormal SARAH. Biopsy positive for IDC/DCIS, ER; low positive, ME: negative, no HER-2 since was insufficient invasive tumor for IDC and was run on the DCIS part.    5/11/23 ECHO with EF 65%  Oncology History   Invasive ductal carcinoma of left breast in female   2/27/2023 Initial Diagnosis    Invasive ductal carcinoma of left breast in female     2/27/2023 Cancer Staged    Staging form: Breast, AJCC 8th Edition  - Clinical stage from 2/27/2023: cT1mi, cN0, cM0, G3, ER+, ME-, HER2: Not Assessed     5/1/2023 Cancer Staged    Staging form: Breast, AJCC 8th Edition  - Pathologic stage from 5/1/2023: Stage IA (pT1a, pN0(sn), cM0, G2, ER-, ME-, HER2+)     5/22/2023 -  Chemotherapy    Treatment Summary   Plan Name: OP BREAST TRASTUZUMAB PACLITAXEL WEEKLY  Treatment Goal: Curative  Status: Active  Start Date: 5/22/2023  End Date: 4/22/2024  (Planned)  Provider: Alexandra Hannah MD  Chemotherapy: PACLitaxeL (TAXOL) 80 mg/m2 = 144 mg in sodium chloride 0.9% 250 mL chemo infusion, 80 mg/m2 = 144 mg, Intravenous, Clinic/HOD 1 time, 11 of 12 cycles  Administration: 144 mg (5/22/2023), 144 mg (5/29/2023), 144 mg (6/5/2023), 144 mg (6/12/2023), 144 mg (6/19/2023), 144 mg (6/26/2023), 144 mg (7/3/2023), 144 mg (7/10/2023), 144 mg (7/17/2023), 144 mg (7/24/2023), 144 mg (7/31/2023)  trastuzumab-dkst (OGIVRI) 278 mg in sodium chloride 0.9% 298.2 mL chemo infusion, 4 mg/kg = 278 mg, Intravenous, Clinic/HOD 1 time, 11 of 25 cycles  Administration: 278 mg (5/22/2023), 139 mg (5/29/2023), 136 mg (6/5/2023), 136 mg (6/12/2023), 136 mg (6/19/2023), 136 mg (6/26/2023), 136 mg (7/3/2023), 136 mg (7/10/2023), 136 mg (7/17/2023), 136 mg (7/24/2023), 139 mg (7/31/2023)     Ductal carcinoma in situ (DCIS) of left breast   3/5/2023 Initial Diagnosis    Ductal carcinoma in situ (DCIS) of left breast     5/22/2023 -  Chemotherapy    Treatment Summary   Plan Name: OP BREAST TRASTUZUMAB PACLITAXEL WEEKLY  Treatment Goal: Curative  Status: Active  Start Date: 5/22/2023  End Date: 4/22/2024 (Planned)  Provider: Alexandra Hannah MD  Chemotherapy: PACLitaxeL (TAXOL) 80 mg/m2 = 144 mg in sodium chloride 0.9% 250 mL chemo infusion, 80 mg/m2 = 144 mg, Intravenous, Clinic/HOD 1 time, 11 of 12 cycles  Administration: 144 mg (5/22/2023), 144 mg (5/29/2023), 144 mg (6/5/2023), 144 mg (6/12/2023), 144 mg (6/19/2023), 144 mg (6/26/2023), 144 mg (7/3/2023), 144 mg (7/10/2023), 144 mg (7/17/2023), 144 mg (7/24/2023), 144 mg (7/31/2023)  trastuzumab-dkst (OGIVRI) 278 mg in sodium chloride 0.9% 298.2 mL chemo infusion, 4 mg/kg = 278 mg, Intravenous, Clinic/HOD 1 time, 11 of 25 cycles  Administration: 278 mg (5/22/2023), 139 mg (5/29/2023), 136 mg (6/5/2023), 136 mg (6/12/2023), 136 mg (6/19/2023), 136 mg (6/26/2023), 136 mg (7/3/2023), 136 mg (7/10/2023), 136 mg (7/17/2023), 136 mg (7/24/2023), 139 mg  (7/31/2023)        Past Medical History:   Past Medical History:   Diagnosis Date    Abnormal results of liver function studies     Cancer     left breast cancer    Chest pain     Fatigue     H/O: pneumonia     History of chicken pox     Hyperlipidemia, mixed     Hypertension     Menopausal syndrome     Palpitations 12/15/2015    PONV (postoperative nausea and vomiting)     Thyroid disease        Past Surgical HIstory:   Past Surgical History:   Procedure Laterality Date    BREAST RECONSTRUCTION Left 4/20/2023    Procedure: RECONSTRUCTION, BREAST;  Surgeon: Pipo Bro MD;  Location: [facility] OR;  Service: Plastics;  Laterality: Left;    CATARACT EXTRACTION W/ INTRAOCULAR LENS  IMPLANT, BILATERAL      COLONOSCOPY      COSMETIC SURGERY      DEBRIDEMENT AND CLOSURE OF WOUND OF FINGER Left 11/29/2018    Procedure: DEBRIDEMENT, WOUND, FINGER, WITH CLOSURE, VY FLap;  Surgeon: Frederic Barbour MD;  Location: [facility] OR;  Service: Plastics;  Laterality: Left;    INSERTION OF BREAST IMPLANT Left 4/20/2023    Procedure: INSERTION, BREAST IMPLANT;  Surgeon: Pipo Bro MD;  Location: [facility] OR;  Service: Plastics;  Laterality: Left;    KNEE SURGERY Right 2015    LEFT HEART CATHETERIZATION Left 11/18/2021    Procedure: CATHETERIZATION, HEART, LEFT;  Surgeon: Basim Castrejon MD;  Location: [facility];  Service: Cardiology;  Laterality: Left;    PLACEMENT OF ACELLULAR HUMAN DERMAL ALLOGRAFT Left 4/20/2023    Procedure: APPLICATION, ACELLULAR HUMAN DERMAL ALLOGRAFT;  Surgeon: Pipo Bro MD;  Location: [facility] OR;  Service: Plastics;  Laterality: Left;    SENTINEL LYMPH NODE BIOPSY Left 4/20/2023    Procedure: BIOPSY, LYMPH NODE, SENTINEL;  Surgeon: Amanda Mcclellan MD;  Location: [facility] OR;  Service: General;  Laterality: Left;    SIMPLE MASTECTOMY Left 4/20/2023    Procedure: MASTECTOMY, SIMPLE;  Surgeon: Amanda Mcclellan MD;  Location: [facility] OR;  Service: General;  Laterality: Left;    TONSILLECTOMY  1955       Family History:    Family History   Problem Relation Age of Onset    No Known Problems Mother     Heart disease Father     Heart attack Father         at age 64    Cancer Sister         folicular non-hodgkins lymphoma    Lymphoma Sister     Diabetes Maternal Grandmother        Social History:  reports that she has quit smoking. She has never used smokeless tobacco. She reports that she does not drink alcohol and does not use drugs.    Allergies:  Review of patient's allergies indicates:  No Known Allergies    Medications:  Current Outpatient Medications   Medication Sig Dispense Refill    ARMOUR THYROID 30 mg Tab once daily.      cyanocobalamin, vitamin B-12, 3,000 mcg Cap Take by mouth once daily. Triple Blend with folate 680mcg      duke's soln (benadryl 30 mL, mylanta 30 mL, LIDOcaine 30 mL, nystatin 30 mL) 120mL Take 10 mLs by mouth 4 (four) times daily. 120 mL 0    fluticasone propionate (FLONASE) 50 mcg/actuation nasal spray 1 spray by Each Nostril route once daily.      KRILL OIL ORAL Take 2,000 mg by mouth once daily.      losartan (COZAAR) 25 MG tablet once daily.      metFORMIN (GLUCOPHAGE) 500 MG tablet Take 500 mg by mouth 2 (two) times daily.      mupirocin (BACTROBAN) 2 % ointment SMARTSIG:Both Nares      ondansetron (ZOFRAN-ODT) 4 MG TbDL Take 1 tablet (4 mg total) by mouth every 8 (eight) hours as needed (nausea). 12 tablet 0    promethazine (PHENERGAN) 25 MG tablet Take 1 tablet (25 mg total) by mouth every 6 (six) hours as needed for Nausea. 30 tablet 0    rosuvastatin (CRESTOR) 20 MG tablet Take 20 mg by mouth once daily.      traZODone (DESYREL) 50 MG tablet Take 1 tablet (50 mg total) by mouth nightly. 30 tablet 2    UNABLE TO FIND Take 1,000 mg by mouth once daily. medication name: dried liver      VITAMIN A ORAL Take 5,000 Int'l Units/L by mouth once daily.      vitamin K2 45 mcg Cap Take by mouth once daily.      zinc gluconate 50 mg tablet Take 25 mg by mouth once daily.      magnesium 200 mg Tab Take 400 mg  "by mouth once daily.       No current facility-administered medications for this visit.     Facility-Administered Medications Ordered in Other Visits   Medication Dose Route Frequency Provider Last Rate Last Admin    0.9%  NaCl infusion   Intravenous Continuous Marissa Lei NP           Review of Systems   Constitutional:  Negative for appetite change and unexpected weight change.   HENT:  Negative for mouth sores.    Eyes:  Negative for visual disturbance.   Respiratory:  Negative for cough and shortness of breath.    Cardiovascular:  Negative for chest pain.   Gastrointestinal:  Negative for abdominal pain and diarrhea.   Genitourinary:  Negative for difficulty urinating, dysuria, flank pain, frequency, hematuria and vaginal bleeding.   Musculoskeletal:  Negative for back pain.   Skin:  Negative for rash.   Neurological:  Negative for headaches.   Hematological:  Negative for adenopathy.   Psychiatric/Behavioral:  The patient is not nervous/anxious.        ECOG Performance Status:   ECOG SCORE    0 - Fully active-able to carry on all pre-disease performance without restriction         Objective:      Vitals:   Vitals:    08/04/23 1250   BP: 132/78   BP Location: Left arm   Patient Position: Sitting   BP Method: Medium (Manual)   Pulse: 95   Resp: 16   Temp: 97.9 °F (36.6 °C)   TempSrc: Temporal   SpO2: 95%   Weight: 70.1 kg (154 lb 8.7 oz)   Height: 5' 6" (1.676 m)               BMI: Body mass index is 24.94 kg/m².    Physical Exam  Vitals reviewed.   Constitutional:       General: She is not in acute distress.     Appearance: She is not diaphoretic.   HENT:      Head: Normocephalic and atraumatic.      Mouth/Throat:      Mouth: Mucous membranes are moist.      Pharynx: No oropharyngeal exudate.   Eyes:      General: No scleral icterus.  Cardiovascular:      Rate and Rhythm: Normal rate and regular rhythm.      Heart sounds: Normal heart sounds. No murmur heard.  Pulmonary:      Effort: Pulmonary effort is " "normal. No respiratory distress.   Abdominal:      General: There is no distension.      Palpations: Abdomen is soft.   Musculoskeletal:      Right lower leg: No edema.      Left lower leg: No edema.   Lymphadenopathy:      Cervical: No cervical adenopathy.   Skin:     Coloration: Skin is not jaundiced.      Findings: No rash.   Neurological:      Mental Status: She is alert and oriented to person, place, and time.   Psychiatric:         Mood and Affect: Mood normal.         Behavior: Behavior normal.         Laboratory Data:  Lab Results   Component Value Date    WBC 4.07 08/04/2023    HGB 11.0 (L) 08/04/2023    HCT 32.7 (L) 08/04/2023    MCV 97 08/04/2023     08/04/2023        Assessment:       1. Invasive ductal carcinoma of left breast in female    2. Vaginal bleeding    3. Immunodeficiency due to chemotherapy    4. Anemia associated with chemotherapy    5. Mucositis due to chemotherapy             Plan:   Invasive ductal carcinoma of the left breast  -no indication for neoadjuvant chemo, no indication for further imaging, no indication for oncotype especially with possible T1a or T1b IDC and age >70's   -ER: 5.4%+, MN: negative, HER-2 non assess initially then HER-2  +after IDC  was found  -significant discussion about adjuvant chemo vs endocrine but given her low ER/MN positivity, HER-2 therapy might be her only way for preventing this cancer from coming back  -Last DEXA scan  in 2016 showed osteopenia   -decision to move forward with ; began 5/22/23  -Echo 5/11/23 with EF 65%; next scheduled for 8/14/23  -Proceed with C12D1  on 8/723  -Follow up with Dr. Hannah in one week with labs prior to appointment    Vaginal bleeding  -Most likely due to hormonal changes  -Underwent endometrial biopsy 6/7/2023 shows "overall appearance of this endometrium is suggestive of reparative changes."   -Improving, continue to follow with GYN, Dr. Valladares   -Follow up with Dr. Hernandez, considering D&C    Immunodeficiency " due to drug/chemotherapy  -Patient at risk for infection   -No current signs/symptoms of infection  -Continue to monitor closely while on therapy    Anemia of chronic disease  -Mild, Hgb 11 g/dL today  -Ferritin adequate on 7/21/23  -Monitor       Assessment/Plan reviewed and approved by Dr. Hannah.    20 minutes were spent in coordination of patient's care, record review and counseling.    Route Chart for Scheduling    Med Onc Chart Routing      Follow up with physician 1 week. Dr. Hannah as planned   Follow up with [facility]    Infusion scheduling note   Proceed with C12D1 on 8/7/23   Injection scheduling note    Labs    Imaging    Pharmacy appointment    Other referrals              Treatment Plan Information   OP BREAST TRASTUZUMAB PACLITAXEL WEEKLY   Alexandra Hannah MD   Upcoming Treatment Dates - OP BREAST TRASTUZUMAB PACLITAXEL WEEKLY    8/7/2023       Pre-Medications       diphenhydrAMINE (BENADRYL) 25 mg in NS 50 mL IVPB       dexAMETHasone injection 4 mg       famotidine (PF) injection 20 mg       Chemotherapy       trastuzumab-dkst (OGIVRI) 139 mg in sodium chloride 0.9% 250 mL chemo infusion       PACLitaxeL (TAXOL) 80 mg/m2 = 144 mg in sodium chloride 0.9% 250 mL chemo infusion  8/14/2023       Chemotherapy       trastuzumab-dkst (OGIVRI) 417 mg in sodium chloride 0.9% 250 mL chemo infusion  9/4/2023       Chemotherapy       trastuzumab-dkst (OGIVRI) 417 mg in sodium chloride 0.9% 250 mL chemo infusion  9/25/2023       Chemotherapy       trastuzumab-dkst (OGIVRI) 417 mg in sodium chloride 0.9% 250 mL chemo infusion

## 2023-08-04 ENCOUNTER — OFFICE VISIT (OUTPATIENT)
Dept: HEMATOLOGY/ONCOLOGY | Facility: CLINIC | Age: 73
End: 2023-08-04
Payer: MEDICARE

## 2023-08-04 ENCOUNTER — LAB VISIT (OUTPATIENT)
Dept: LAB | Facility: HOSPITAL | Age: 73
End: 2023-08-04
Attending: STUDENT IN AN ORGANIZED HEALTH CARE EDUCATION/TRAINING PROGRAM
Payer: MEDICARE

## 2023-08-04 VITALS
TEMPERATURE: 98 F | RESPIRATION RATE: 16 BRPM | HEART RATE: 95 BPM | OXYGEN SATURATION: 95 % | HEIGHT: 66 IN | SYSTOLIC BLOOD PRESSURE: 132 MMHG | DIASTOLIC BLOOD PRESSURE: 78 MMHG | BODY MASS INDEX: 24.84 KG/M2 | WEIGHT: 154.56 LBS

## 2023-08-04 DIAGNOSIS — K12.31 MUCOSITIS DUE TO CHEMOTHERAPY: ICD-10-CM

## 2023-08-04 DIAGNOSIS — Z79.899 IMMUNODEFICIENCY DUE TO CHEMOTHERAPY: ICD-10-CM

## 2023-08-04 DIAGNOSIS — C50.912 INVASIVE DUCTAL CARCINOMA OF LEFT BREAST IN FEMALE: ICD-10-CM

## 2023-08-04 DIAGNOSIS — T45.1X5A IMMUNODEFICIENCY DUE TO CHEMOTHERAPY: ICD-10-CM

## 2023-08-04 DIAGNOSIS — T45.1X5A ANEMIA ASSOCIATED WITH CHEMOTHERAPY: ICD-10-CM

## 2023-08-04 DIAGNOSIS — C50.912 INVASIVE DUCTAL CARCINOMA OF LEFT BREAST IN FEMALE: Primary | ICD-10-CM

## 2023-08-04 DIAGNOSIS — D84.821 IMMUNODEFICIENCY DUE TO CHEMOTHERAPY: ICD-10-CM

## 2023-08-04 DIAGNOSIS — D64.81 ANEMIA ASSOCIATED WITH CHEMOTHERAPY: ICD-10-CM

## 2023-08-04 DIAGNOSIS — N93.9 VAGINAL BLEEDING: ICD-10-CM

## 2023-08-04 LAB
ALBUMIN SERPL BCP-MCNC: 3.7 G/DL (ref 3.5–5.2)
ALP SERPL-CCNC: 53 U/L (ref 55–135)
ALT SERPL W/O P-5'-P-CCNC: 63 U/L (ref 10–44)
ANION GAP SERPL CALC-SCNC: 7 MMOL/L (ref 8–16)
AST SERPL-CCNC: 54 U/L (ref 10–40)
BASOPHILS # BLD AUTO: 0.02 K/UL (ref 0–0.2)
BASOPHILS NFR BLD: 0.5 % (ref 0–1.9)
BILIRUB SERPL-MCNC: 0.5 MG/DL (ref 0.1–1)
BUN SERPL-MCNC: 19 MG/DL (ref 8–23)
CALCIUM SERPL-MCNC: 8.9 MG/DL (ref 8.7–10.5)
CHLORIDE SERPL-SCNC: 107 MMOL/L (ref 95–110)
CO2 SERPL-SCNC: 25 MMOL/L (ref 23–29)
CREAT SERPL-MCNC: 0.8 MG/DL (ref 0.5–1.4)
DIFFERENTIAL METHOD: ABNORMAL
EOSINOPHIL # BLD AUTO: 0.1 K/UL (ref 0–0.5)
EOSINOPHIL NFR BLD: 2 % (ref 0–8)
ERYTHROCYTE [DISTWIDTH] IN BLOOD BY AUTOMATED COUNT: 14.6 % (ref 11.5–14.5)
EST. GFR  (NO RACE VARIABLE): >60 ML/MIN/1.73 M^2
GLUCOSE SERPL-MCNC: 131 MG/DL (ref 70–110)
HCT VFR BLD AUTO: 32.7 % (ref 37–48.5)
HGB BLD-MCNC: 11 G/DL (ref 12–16)
IMM GRANULOCYTES # BLD AUTO: 0.02 K/UL (ref 0–0.04)
IMM GRANULOCYTES NFR BLD AUTO: 0.5 % (ref 0–0.5)
LYMPHOCYTES # BLD AUTO: 1.2 K/UL (ref 1–4.8)
LYMPHOCYTES NFR BLD: 30.2 % (ref 18–48)
MAGNESIUM SERPL-MCNC: 2 MG/DL (ref 1.6–2.6)
MCH RBC QN AUTO: 32.6 PG (ref 27–31)
MCHC RBC AUTO-ENTMCNC: 33.6 G/DL (ref 32–36)
MCV RBC AUTO: 97 FL (ref 82–98)
MONOCYTES # BLD AUTO: 0.2 K/UL (ref 0.3–1)
MONOCYTES NFR BLD: 3.9 % (ref 4–15)
NEUTROPHILS # BLD AUTO: 2.6 K/UL (ref 1.8–7.7)
NEUTROPHILS NFR BLD: 62.9 % (ref 38–73)
NRBC BLD-RTO: 0 /100 WBC
PLATELET # BLD AUTO: 186 K/UL (ref 150–450)
PMV BLD AUTO: 9.3 FL (ref 9.2–12.9)
POTASSIUM SERPL-SCNC: 4.5 MMOL/L (ref 3.5–5.1)
PROT SERPL-MCNC: 6.5 G/DL (ref 6–8.4)
RBC # BLD AUTO: 3.37 M/UL (ref 4–5.4)
SODIUM SERPL-SCNC: 139 MMOL/L (ref 136–145)
WBC # BLD AUTO: 4.07 K/UL (ref 3.9–12.7)

## 2023-08-04 PROCEDURE — 99999 PR PBB SHADOW E&M-EST. PATIENT-LVL IV: ICD-10-PCS | Mod: PBBFAC,,,

## 2023-08-04 PROCEDURE — 99213 OFFICE O/P EST LOW 20 MIN: CPT | Mod: S$PBB,,,

## 2023-08-04 PROCEDURE — 99214 OFFICE O/P EST MOD 30 MIN: CPT | Mod: PBBFAC,PN

## 2023-08-04 PROCEDURE — 80053 COMPREHEN METABOLIC PANEL: CPT | Mod: PN | Performed by: STUDENT IN AN ORGANIZED HEALTH CARE EDUCATION/TRAINING PROGRAM

## 2023-08-04 PROCEDURE — 36415 COLL VENOUS BLD VENIPUNCTURE: CPT | Mod: PN | Performed by: STUDENT IN AN ORGANIZED HEALTH CARE EDUCATION/TRAINING PROGRAM

## 2023-08-04 PROCEDURE — 99213 PR OFFICE/OUTPT VISIT, EST, LEVL III, 20-29 MIN: ICD-10-PCS | Mod: S$PBB,,,

## 2023-08-04 PROCEDURE — 99999 PR PBB SHADOW E&M-EST. PATIENT-LVL IV: CPT | Mod: PBBFAC,,,

## 2023-08-04 PROCEDURE — 85025 COMPLETE CBC W/AUTO DIFF WBC: CPT | Mod: PN | Performed by: STUDENT IN AN ORGANIZED HEALTH CARE EDUCATION/TRAINING PROGRAM

## 2023-08-04 PROCEDURE — 83735 ASSAY OF MAGNESIUM: CPT | Mod: PN | Performed by: STUDENT IN AN ORGANIZED HEALTH CARE EDUCATION/TRAINING PROGRAM

## 2023-08-04 RX ORDER — EPINEPHRINE 0.3 MG/.3ML
0.3 INJECTION SUBCUTANEOUS ONCE AS NEEDED
Status: CANCELLED | OUTPATIENT
Start: 2023-08-07

## 2023-08-04 RX ORDER — SODIUM CHLORIDE 0.9 % (FLUSH) 0.9 %
10 SYRINGE (ML) INJECTION
Status: CANCELLED | OUTPATIENT
Start: 2023-08-07

## 2023-08-04 RX ORDER — HEPARIN 100 UNIT/ML
500 SYRINGE INTRAVENOUS
Status: CANCELLED | OUTPATIENT
Start: 2023-08-07

## 2023-08-04 RX ORDER — FAMOTIDINE 10 MG/ML
20 INJECTION INTRAVENOUS
Status: CANCELLED | OUTPATIENT
Start: 2023-08-07

## 2023-08-04 RX ORDER — DIPHENHYDRAMINE HYDROCHLORIDE 50 MG/ML
50 INJECTION INTRAMUSCULAR; INTRAVENOUS ONCE AS NEEDED
Status: CANCELLED | OUTPATIENT
Start: 2023-08-07

## 2023-08-04 RX ORDER — DEXAMETHASONE SODIUM PHOSPHATE 4 MG/ML
4 INJECTION, SOLUTION INTRA-ARTICULAR; INTRALESIONAL; INTRAMUSCULAR; INTRAVENOUS; SOFT TISSUE
Status: CANCELLED
Start: 2023-08-07

## 2023-08-07 ENCOUNTER — INFUSION (OUTPATIENT)
Dept: INFUSION THERAPY | Facility: HOSPITAL | Age: 73
End: 2023-08-07
Attending: STUDENT IN AN ORGANIZED HEALTH CARE EDUCATION/TRAINING PROGRAM
Payer: MEDICARE

## 2023-08-07 VITALS
TEMPERATURE: 99 F | DIASTOLIC BLOOD PRESSURE: 75 MMHG | HEART RATE: 96 BPM | RESPIRATION RATE: 18 BRPM | SYSTOLIC BLOOD PRESSURE: 128 MMHG

## 2023-08-07 DIAGNOSIS — D05.12 DUCTAL CARCINOMA IN SITU (DCIS) OF LEFT BREAST: Primary | ICD-10-CM

## 2023-08-07 DIAGNOSIS — C50.912 INVASIVE DUCTAL CARCINOMA OF LEFT BREAST IN FEMALE: ICD-10-CM

## 2023-08-07 PROCEDURE — 63600175 PHARM REV CODE 636 W HCPCS: Mod: PN

## 2023-08-07 PROCEDURE — 96367 TX/PROPH/DG ADDL SEQ IV INF: CPT | Mod: PN

## 2023-08-07 PROCEDURE — 25000003 PHARM REV CODE 250: Mod: PN

## 2023-08-07 PROCEDURE — 96413 CHEMO IV INFUSION 1 HR: CPT | Mod: PN

## 2023-08-07 PROCEDURE — 96375 TX/PRO/DX INJ NEW DRUG ADDON: CPT | Mod: PN

## 2023-08-07 PROCEDURE — 96417 CHEMO IV INFUS EACH ADDL SEQ: CPT | Mod: PN

## 2023-08-07 RX ORDER — HEPARIN 100 UNIT/ML
500 SYRINGE INTRAVENOUS
Status: DISCONTINUED | OUTPATIENT
Start: 2023-08-07 | End: 2023-08-07 | Stop reason: HOSPADM

## 2023-08-07 RX ORDER — SODIUM CHLORIDE 0.9 % (FLUSH) 0.9 %
10 SYRINGE (ML) INJECTION
Status: DISCONTINUED | OUTPATIENT
Start: 2023-08-07 | End: 2023-08-07 | Stop reason: HOSPADM

## 2023-08-07 RX ORDER — DEXAMETHASONE SODIUM PHOSPHATE 4 MG/ML
4 INJECTION, SOLUTION INTRA-ARTICULAR; INTRALESIONAL; INTRAMUSCULAR; INTRAVENOUS; SOFT TISSUE
Status: COMPLETED | OUTPATIENT
Start: 2023-08-07 | End: 2023-08-07

## 2023-08-07 RX ORDER — FAMOTIDINE 10 MG/ML
20 INJECTION INTRAVENOUS
Status: COMPLETED | OUTPATIENT
Start: 2023-08-07 | End: 2023-08-07

## 2023-08-07 RX ORDER — EPINEPHRINE 0.3 MG/.3ML
0.3 INJECTION SUBCUTANEOUS ONCE AS NEEDED
Status: DISCONTINUED | OUTPATIENT
Start: 2023-08-07 | End: 2023-08-07 | Stop reason: HOSPADM

## 2023-08-07 RX ORDER — DIPHENHYDRAMINE HYDROCHLORIDE 50 MG/ML
50 INJECTION INTRAMUSCULAR; INTRAVENOUS ONCE AS NEEDED
Status: DISCONTINUED | OUTPATIENT
Start: 2023-08-07 | End: 2023-08-07 | Stop reason: HOSPADM

## 2023-08-07 RX ADMIN — DEXAMETHASONE SODIUM PHOSPHATE 4 MG: 4 INJECTION, SOLUTION INTRA-ARTICULAR; INTRALESIONAL; INTRAMUSCULAR; INTRAVENOUS; SOFT TISSUE at 01:08

## 2023-08-07 RX ADMIN — SODIUM CHLORIDE: 9 INJECTION, SOLUTION INTRAVENOUS at 11:08

## 2023-08-07 RX ADMIN — TRASTUZUMAB 139 MG: 150 INJECTION, POWDER, LYOPHILIZED, FOR SOLUTION INTRAVENOUS at 12:08

## 2023-08-07 RX ADMIN — FAMOTIDINE 20 MG: 10 INJECTION INTRAVENOUS at 01:08

## 2023-08-07 RX ADMIN — DIPHENHYDRAMINE HYDROCHLORIDE 25 MG: 50 INJECTION, SOLUTION INTRAMUSCULAR; INTRAVENOUS at 01:08

## 2023-08-07 RX ADMIN — PACLITAXEL 144 MG: 6 INJECTION, SOLUTION, CONCENTRATE INTRAVENOUS at 02:08

## 2023-08-07 NOTE — PLAN OF CARE
Problem: Adult Inpatient Plan of Care  Goal: Plan of Care Review  8/7/2023 1555 by Esvin Martino, RN  Outcome: Ongoing, Progressing  8/7/2023 1508 by Esvin Martino, RN  Outcome: Ongoing, Progressing  Flowsheets (Taken 8/7/2023 1200)  Plan of Care Reviewed With: patient  Goal: Patient-Specific Goal (Individualized)  Flowsheets (Taken 8/7/2023 1200)  Anxieties, Fears or Concerns: none  Individualized Care Needs: recliner, dimmed lights, conversation     Problem: Fatigue (Oncology Care)  Goal: Improved Activity Tolerance  Intervention: Promote Improved Energy  Flowsheets (Taken 8/7/2023 1200)  Fatigue Management: paced activity encouraged  Sleep/Rest Enhancement:   family presence promoted   noise level reduced   room darkened  Activity Management:   Ambulated -L4   Up in chair - L3     Pt tolerated taxol/herceptin, VSS. Pt voiced no new complaints at this time. NAD noted. Pt dc home, aware of upcoming appts

## 2023-08-11 ENCOUNTER — LAB VISIT (OUTPATIENT)
Dept: LAB | Facility: HOSPITAL | Age: 73
End: 2023-08-11
Attending: STUDENT IN AN ORGANIZED HEALTH CARE EDUCATION/TRAINING PROGRAM
Payer: MEDICARE

## 2023-08-11 ENCOUNTER — OFFICE VISIT (OUTPATIENT)
Dept: HEMATOLOGY/ONCOLOGY | Facility: CLINIC | Age: 73
End: 2023-08-11
Payer: MEDICARE

## 2023-08-11 VITALS
DIASTOLIC BLOOD PRESSURE: 90 MMHG | BODY MASS INDEX: 24.73 KG/M2 | RESPIRATION RATE: 16 BRPM | OXYGEN SATURATION: 96 % | WEIGHT: 153.88 LBS | HEIGHT: 66 IN | SYSTOLIC BLOOD PRESSURE: 158 MMHG | TEMPERATURE: 98 F | HEART RATE: 114 BPM

## 2023-08-11 DIAGNOSIS — Z79.899 IMMUNODEFICIENCY DUE TO CHEMOTHERAPY: ICD-10-CM

## 2023-08-11 DIAGNOSIS — N93.9 VAGINAL BLEEDING: ICD-10-CM

## 2023-08-11 DIAGNOSIS — D84.821 IMMUNODEFICIENCY DUE TO CHEMOTHERAPY: ICD-10-CM

## 2023-08-11 DIAGNOSIS — C50.912 INVASIVE DUCTAL CARCINOMA OF LEFT BREAST IN FEMALE: ICD-10-CM

## 2023-08-11 DIAGNOSIS — T45.1X5A IMMUNODEFICIENCY DUE TO CHEMOTHERAPY: ICD-10-CM

## 2023-08-11 DIAGNOSIS — D64.81 ANEMIA ASSOCIATED WITH CHEMOTHERAPY: ICD-10-CM

## 2023-08-11 DIAGNOSIS — C50.912 INVASIVE DUCTAL CARCINOMA OF LEFT BREAST IN FEMALE: Primary | ICD-10-CM

## 2023-08-11 DIAGNOSIS — T45.1X5A ANEMIA ASSOCIATED WITH CHEMOTHERAPY: ICD-10-CM

## 2023-08-11 DIAGNOSIS — K12.31 MUCOSITIS DUE TO CHEMOTHERAPY: ICD-10-CM

## 2023-08-11 LAB
ALBUMIN SERPL BCP-MCNC: 3.8 G/DL (ref 3.5–5.2)
ALP SERPL-CCNC: 70 U/L (ref 55–135)
ALT SERPL W/O P-5'-P-CCNC: 73 U/L (ref 10–44)
ANION GAP SERPL CALC-SCNC: 8 MMOL/L (ref 8–16)
AST SERPL-CCNC: 45 U/L (ref 10–40)
BASOPHILS # BLD AUTO: 0.03 K/UL (ref 0–0.2)
BASOPHILS NFR BLD: 0.6 % (ref 0–1.9)
BILIRUB SERPL-MCNC: 0.5 MG/DL (ref 0.1–1)
BUN SERPL-MCNC: 20 MG/DL (ref 8–23)
CALCIUM SERPL-MCNC: 9.6 MG/DL (ref 8.7–10.5)
CHLORIDE SERPL-SCNC: 106 MMOL/L (ref 95–110)
CO2 SERPL-SCNC: 24 MMOL/L (ref 23–29)
CREAT SERPL-MCNC: 0.9 MG/DL (ref 0.5–1.4)
DIFFERENTIAL METHOD: ABNORMAL
EOSINOPHIL # BLD AUTO: 0.1 K/UL (ref 0–0.5)
EOSINOPHIL NFR BLD: 1.4 % (ref 0–8)
ERYTHROCYTE [DISTWIDTH] IN BLOOD BY AUTOMATED COUNT: 14.8 % (ref 11.5–14.5)
EST. GFR  (NO RACE VARIABLE): >60 ML/MIN/1.73 M^2
GLUCOSE SERPL-MCNC: 104 MG/DL (ref 70–110)
HCT VFR BLD AUTO: 33 % (ref 37–48.5)
HGB BLD-MCNC: 11.2 G/DL (ref 12–16)
IMM GRANULOCYTES # BLD AUTO: 0.02 K/UL (ref 0–0.04)
IMM GRANULOCYTES NFR BLD AUTO: 0.4 % (ref 0–0.5)
LYMPHOCYTES # BLD AUTO: 1.8 K/UL (ref 1–4.8)
LYMPHOCYTES NFR BLD: 36.9 % (ref 18–48)
MAGNESIUM SERPL-MCNC: 1.9 MG/DL (ref 1.6–2.6)
MCH RBC QN AUTO: 32.7 PG (ref 27–31)
MCHC RBC AUTO-ENTMCNC: 33.9 G/DL (ref 32–36)
MCV RBC AUTO: 96 FL (ref 82–98)
MONOCYTES # BLD AUTO: 0.2 K/UL (ref 0.3–1)
MONOCYTES NFR BLD: 4.4 % (ref 4–15)
NEUTROPHILS # BLD AUTO: 2.8 K/UL (ref 1.8–7.7)
NEUTROPHILS NFR BLD: 56.3 % (ref 38–73)
NRBC BLD-RTO: 0 /100 WBC
PLATELET # BLD AUTO: 204 K/UL (ref 150–450)
PMV BLD AUTO: 9.3 FL (ref 9.2–12.9)
POTASSIUM SERPL-SCNC: 4.4 MMOL/L (ref 3.5–5.1)
PROT SERPL-MCNC: 6.8 G/DL (ref 6–8.4)
RBC # BLD AUTO: 3.43 M/UL (ref 4–5.4)
SODIUM SERPL-SCNC: 138 MMOL/L (ref 136–145)
WBC # BLD AUTO: 4.99 K/UL (ref 3.9–12.7)

## 2023-08-11 PROCEDURE — 99214 OFFICE O/P EST MOD 30 MIN: CPT | Mod: PBBFAC,PN | Performed by: STUDENT IN AN ORGANIZED HEALTH CARE EDUCATION/TRAINING PROGRAM

## 2023-08-11 PROCEDURE — 99999 PR PBB SHADOW E&M-EST. PATIENT-LVL IV: CPT | Mod: PBBFAC,,, | Performed by: STUDENT IN AN ORGANIZED HEALTH CARE EDUCATION/TRAINING PROGRAM

## 2023-08-11 PROCEDURE — 99215 OFFICE O/P EST HI 40 MIN: CPT | Mod: S$PBB,,, | Performed by: STUDENT IN AN ORGANIZED HEALTH CARE EDUCATION/TRAINING PROGRAM

## 2023-08-11 PROCEDURE — 80053 COMPREHEN METABOLIC PANEL: CPT | Mod: PN | Performed by: STUDENT IN AN ORGANIZED HEALTH CARE EDUCATION/TRAINING PROGRAM

## 2023-08-11 PROCEDURE — 83735 ASSAY OF MAGNESIUM: CPT | Mod: PN | Performed by: STUDENT IN AN ORGANIZED HEALTH CARE EDUCATION/TRAINING PROGRAM

## 2023-08-11 PROCEDURE — 99999 PR PBB SHADOW E&M-EST. PATIENT-LVL IV: ICD-10-PCS | Mod: PBBFAC,,, | Performed by: STUDENT IN AN ORGANIZED HEALTH CARE EDUCATION/TRAINING PROGRAM

## 2023-08-11 PROCEDURE — 85025 COMPLETE CBC W/AUTO DIFF WBC: CPT | Mod: PN | Performed by: STUDENT IN AN ORGANIZED HEALTH CARE EDUCATION/TRAINING PROGRAM

## 2023-08-11 PROCEDURE — 36415 COLL VENOUS BLD VENIPUNCTURE: CPT | Mod: PN | Performed by: STUDENT IN AN ORGANIZED HEALTH CARE EDUCATION/TRAINING PROGRAM

## 2023-08-11 PROCEDURE — 99215 PR OFFICE/OUTPT VISIT, EST, LEVL V, 40-54 MIN: ICD-10-PCS | Mod: S$PBB,,, | Performed by: STUDENT IN AN ORGANIZED HEALTH CARE EDUCATION/TRAINING PROGRAM

## 2023-08-11 NOTE — PROGRESS NOTES
PATIENT: Margaret Rouse  MRN: [id number]  DATE: 8/15/2023    Diagnosis:   1. Invasive ductal carcinoma of left breast in female    2. Vaginal bleeding    3. Immunodeficiency due to chemotherapy    4. Anemia associated with chemotherapy    5. Mucositis due to chemotherapy        Chief Complaint: Invasive ductal carcinoma of left breast in female    Subjective:   HPI: Ms. Rouse is a 72 y.o. female with invasive ductal carcinoma of the left breast who is here today for follow up and consideration of C12D1 Taxol/Ogivri on 8/7/23. She is accompanied by her  today.     She began treatment with Paclitaxel and Trastuzumab on 5/22/23.   Vaginal bleeding has improved, mild spotting now, following up with Dr Hernandez, getting D&C once chemo part is done    Denies HA, CP, SOB, cough, abd pain, diarrhea, constipation, dysuria, swelling, new lumps or bumps, rashes. Dneies fevers, chills.     Oncology History:   2/23/23 Seen for a newly diagnosed stage IA IDC of the left breast. MRI Breasts: clumped non-mass enhancement with associated clip 3.1 x 1.2 x 2.9cm with sub-cm satellites inferiorly up to 6mm at 1.6cm inferior.  No abnormal SARAH. Biopsy positive for IDC/DCIS, ER; low positive, OH: negative, no HER-2 since was insufficient invasive tumor for IDC and was run on the DCIS part.    5/11/23 ECHO with EF 65%  Oncology History   Invasive ductal carcinoma of left breast in female   2/27/2023 Initial Diagnosis    Invasive ductal carcinoma of left breast in female     2/27/2023 Cancer Staged    Staging form: Breast, AJCC 8th Edition  - Clinical stage from 2/27/2023: cT1mi, cN0, cM0, G3, ER+, OH-, HER2: Not Assessed     5/1/2023 Cancer Staged    Staging form: Breast, AJCC 8th Edition  - Pathologic stage from 5/1/2023: Stage IA (pT1a, pN0(sn), cM0, G2, ER-, OH-, HER2+)     5/22/2023 -  Chemotherapy    Treatment Summary   Plan Name: OP BREAST TRASTUZUMAB PACLITAXEL WEEKLY  Treatment Goal: Curative  Status: Active  Start  Date: 5/22/2023  End Date: 4/22/2024 (Planned)  Provider: Alexandra Hannah MD  Chemotherapy: PACLitaxeL (TAXOL) 80 mg/m2 = 144 mg in sodium chloride 0.9% 250 mL chemo infusion, 80 mg/m2 = 144 mg, Intravenous, Clinic/HOD 1 time, 12 of 12 cycles  Administration: 144 mg (5/22/2023), 144 mg (5/29/2023), 144 mg (6/5/2023), 144 mg (6/12/2023), 144 mg (6/19/2023), 144 mg (6/26/2023), 144 mg (7/3/2023), 144 mg (7/10/2023), 144 mg (7/17/2023), 144 mg (7/24/2023), 144 mg (7/31/2023), 144 mg (8/7/2023)  trastuzumab-dkst (OGIVRI) 278 mg in sodium chloride 0.9% 298.2 mL chemo infusion, 4 mg/kg = 278 mg, Intravenous, Clinic/HOD 1 time, 13 of 25 cycles  Administration: 278 mg (5/22/2023), 139 mg (5/29/2023), 417 mg (8/14/2023), 136 mg (6/5/2023), 136 mg (6/12/2023), 136 mg (6/19/2023), 136 mg (6/26/2023), 136 mg (7/3/2023), 136 mg (7/10/2023), 136 mg (7/17/2023), 136 mg (7/24/2023), 139 mg (7/31/2023), 139 mg (8/7/2023)     Ductal carcinoma in situ (DCIS) of left breast   3/5/2023 Initial Diagnosis    Ductal carcinoma in situ (DCIS) of left breast     5/22/2023 -  Chemotherapy    Treatment Summary   Plan Name: OP BREAST TRASTUZUMAB PACLITAXEL WEEKLY  Treatment Goal: Curative  Status: Active  Start Date: 5/22/2023  End Date: 4/22/2024 (Planned)  Provider: Alexandra Hannah MD  Chemotherapy: PACLitaxeL (TAXOL) 80 mg/m2 = 144 mg in sodium chloride 0.9% 250 mL chemo infusion, 80 mg/m2 = 144 mg, Intravenous, Clinic/HOD 1 time, 12 of 12 cycles  Administration: 144 mg (5/22/2023), 144 mg (5/29/2023), 144 mg (6/5/2023), 144 mg (6/12/2023), 144 mg (6/19/2023), 144 mg (6/26/2023), 144 mg (7/3/2023), 144 mg (7/10/2023), 144 mg (7/17/2023), 144 mg (7/24/2023), 144 mg (7/31/2023), 144 mg (8/7/2023)  trastuzumab-dkst (OGIVRI) 278 mg in sodium chloride 0.9% 298.2 mL chemo infusion, 4 mg/kg = 278 mg, Intravenous, Clinic/HOD 1 time, 13 of 25 cycles  Administration: 278 mg (5/22/2023), 139 mg (5/29/2023), 417 mg (8/14/2023), 136 mg (6/5/2023), 136 mg  (6/12/2023), 136 mg (6/19/2023), 136 mg (6/26/2023), 136 mg (7/3/2023), 136 mg (7/10/2023), 136 mg (7/17/2023), 136 mg (7/24/2023), 139 mg (7/31/2023), 139 mg (8/7/2023)        Past Medical History:   Past Medical History:   Diagnosis Date    Abnormal results of liver function studies     Cancer     left breast cancer    Chest pain     Fatigue     H/O: pneumonia     History of chicken pox     Hyperlipidemia, mixed     Hypertension     Menopausal syndrome     Palpitations 12/15/2015    PONV (postoperative nausea and vomiting)     Thyroid disease        Past Surgical HIstory:   Past Surgical History:   Procedure Laterality Date    BREAST RECONSTRUCTION Left 4/20/2023    Procedure: RECONSTRUCTION, BREAST;  Surgeon: Pipo Bro MD;  Location: [facility] OR;  Service: Plastics;  Laterality: Left;    CATARACT EXTRACTION W/ INTRAOCULAR LENS  IMPLANT, BILATERAL      COLONOSCOPY      COSMETIC SURGERY      DEBRIDEMENT AND CLOSURE OF WOUND OF FINGER Left 11/29/2018    Procedure: DEBRIDEMENT, WOUND, FINGER, WITH CLOSURE, VY FLap;  Surgeon: Frederic Barbour MD;  Location: [facility] OR;  Service: Plastics;  Laterality: Left;    INSERTION OF BREAST IMPLANT Left 4/20/2023    Procedure: INSERTION, BREAST IMPLANT;  Surgeon: Pipo Bro MD;  Location: [facility] OR;  Service: Plastics;  Laterality: Left;    KNEE SURGERY Right 2015    LEFT HEART CATHETERIZATION Left 11/18/2021    Procedure: CATHETERIZATION, HEART, LEFT;  Surgeon: Basim Castrejon MD;  Location: [facility] CATH;  Service: Cardiology;  Laterality: Left;    PLACEMENT OF ACELLULAR HUMAN DERMAL ALLOGRAFT Left 4/20/2023    Procedure: APPLICATION, ACELLULAR HUMAN DERMAL ALLOGRAFT;  Surgeon: Pipo Bro MD;  Location: [facility] OR;  Service: Plastics;  Laterality: Left;    SENTINEL LYMPH NODE BIOPSY Left 4/20/2023    Procedure: BIOPSY, LYMPH NODE, SENTINEL;  Surgeon: Amanda Mccllelan MD;  Location: [facility] OR;  Service: General;  Laterality: Left;    SIMPLE MASTECTOMY Left 4/20/2023    Procedure:  MASTECTOMY, SIMPLE;  Surgeon: Amanda Mcclellan MD;  Location: [facility];  Service: General;  Laterality: Left;    TONSILLECTOMY  1955       Family History:   Family History   Problem Relation Age of Onset    No Known Problems Mother     Heart disease Father     Heart attack Father         at age 64    Cancer Sister         folicular non-hodgkins lymphoma    Lymphoma Sister     Diabetes Maternal Grandmother        Social History:  reports that she has quit smoking. She has never used smokeless tobacco. She reports that she does not drink alcohol and does not use drugs.    Allergies:  Review of patient's allergies indicates:  No Known Allergies    Medications:  Current Outpatient Medications   Medication Sig Dispense Refill    ARMOUR THYROID 30 mg Tab once daily.      cyanocobalamin, vitamin B-12, 3,000 mcg Cap Take by mouth once daily. Triple Blend with folate 680mcg      duke's soln (benadryl 30 mL, mylanta 30 mL, LIDOcaine 30 mL, nystatin 30 mL) 120mL Take 10 mLs by mouth 4 (four) times daily. 120 mL 0    fluticasone propionate (FLONASE) 50 mcg/actuation nasal spray 1 spray by Each Nostril route once daily.      KRILL OIL ORAL Take 2,000 mg by mouth once daily.      losartan (COZAAR) 25 MG tablet once daily.      magnesium 200 mg Tab Take 400 mg by mouth once daily.      metFORMIN (GLUCOPHAGE) 500 MG tablet Take 500 mg by mouth 2 (two) times daily.      mupirocin (BACTROBAN) 2 % ointment SMARTSIG:Both Nares      ondansetron (ZOFRAN-ODT) 4 MG TbDL Take 1 tablet (4 mg total) by mouth every 8 (eight) hours as needed (nausea). 12 tablet 0    promethazine (PHENERGAN) 25 MG tablet Take 1 tablet (25 mg total) by mouth every 6 (six) hours as needed for Nausea. 30 tablet 0    rosuvastatin (CRESTOR) 20 MG tablet Take 20 mg by mouth once daily.      traZODone (DESYREL) 50 MG tablet Take 1 tablet (50 mg total) by mouth nightly. 30 tablet 2    UNABLE TO FIND Take 1,000 mg by mouth once daily. medication name: dried liver       "VITAMIN A ORAL Take 5,000 Int'l Units/L by mouth once daily.      vitamin K2 45 mcg Cap Take by mouth once daily.      zinc gluconate 50 mg tablet Take 25 mg by mouth once daily.       No current facility-administered medications for this visit.     Facility-Administered Medications Ordered in Other Visits   Medication Dose Route Frequency Provider Last Rate Last Admin    0.9%  NaCl infusion   Intravenous Continuous Marissa Lei, NP           Review of Systems   Constitutional:  Negative for appetite change and unexpected weight change.   HENT:  Negative for mouth sores.    Eyes:  Negative for visual disturbance.   Respiratory:  Negative for cough and shortness of breath.    Cardiovascular:  Negative for chest pain.   Gastrointestinal:  Negative for abdominal pain and diarrhea.   Genitourinary:  Negative for difficulty urinating, dysuria, flank pain, frequency, hematuria and vaginal bleeding.   Musculoskeletal:  Negative for back pain.   Skin:  Negative for rash.   Neurological:  Negative for headaches.   Hematological:  Negative for adenopathy.   Psychiatric/Behavioral:  The patient is not nervous/anxious.        ECOG Performance Status:   ECOG SCORE             Objective:      Vitals:   Vitals:    08/11/23 1309   BP: (!) 158/90   Pulse: (!) 114   Resp: 16   Temp: 98.2 °F (36.8 °C)   TempSrc: Temporal   SpO2: 96%   Weight: 69.8 kg (153 lb 14.1 oz)   Height: 5' 6" (1.676 m)               BMI: Body mass index is 24.84 kg/m².    Physical Exam  Vitals reviewed.   Constitutional:       General: She is not in acute distress.     Appearance: She is not diaphoretic.   HENT:      Head: Normocephalic and atraumatic.      Mouth/Throat:      Mouth: Mucous membranes are moist.      Pharynx: No oropharyngeal exudate.   Eyes:      General: No scleral icterus.  Cardiovascular:      Rate and Rhythm: Normal rate and regular rhythm.      Heart sounds: Normal heart sounds. No murmur heard.  Pulmonary:      Effort: Pulmonary " "effort is normal. No respiratory distress.   Abdominal:      General: There is no distension.      Palpations: Abdomen is soft.   Musculoskeletal:      Right lower leg: No edema.      Left lower leg: No edema.   Lymphadenopathy:      Cervical: No cervical adenopathy.   Skin:     Coloration: Skin is not jaundiced.      Findings: No rash.   Neurological:      Mental Status: She is alert and oriented to person, place, and time.   Psychiatric:         Mood and Affect: Mood normal.         Behavior: Behavior normal.         Laboratory Data:  Lab Results   Component Value Date    WBC 4.99 08/11/2023    HGB 11.2 (L) 08/11/2023    HCT 33.0 (L) 08/11/2023    MCV 96 08/11/2023     08/11/2023        Assessment:       1. Invasive ductal carcinoma of left breast in female    2. Vaginal bleeding    3. Immunodeficiency due to chemotherapy    4. Anemia associated with chemotherapy    5. Mucositis due to chemotherapy               Plan:   Invasive ductal carcinoma of the left breast  -no indication for neoadjuvant chemo, no indication for further imaging, no indication for oncotype especially with possible T1a or T1b IDC and age >70's   -ER: 5.4%+, MA: negative, HER-2 non assess initially then HER-2  +after IDC  was found  -significant discussion about adjuvant chemo vs endocrine but given her low ER/MA positivity, HER-2 therapy might be her only way for preventing this cancer from coming back  -Last DEXA scan  in 2016 showed osteopenia   -decision to move forward with TH; began 5/22/23  -Echo 5/11/23 with EF 65%; next scheduled for 8/14/23  -Proceed with C13D1 TH then will move to Herceptin only until finish 1 year    Vaginal bleeding  -Most likely due to hormonal changes  -Underwent endometrial biopsy 6/7/2023 shows "overall appearance of this endometrium is suggestive of reparative changes."   -Improving, continue to follow with GYN, Dr. Valladares   -Follow up with Dr. Hernandez,D&C once chemo part is done    Immunodeficiency due " to drug/chemotherapy  -Patient at risk for infection   -No current signs/symptoms of infection  -Continue to monitor closely while on therapy    Anemia of chronic disease  -Mild, Hgb 11 g/dL today  -Ferritin adequate on 7/21/23  -Monitor       40 minutes were spent in coordination of patient's care, record review and counseling.    Route Chart for Scheduling    Med Onc Chart Routing      Follow up with physician Other. Keep james as they are   Follow up with JAMES 6 weeks.   Infusion scheduling note    Injection scheduling note    Labs    Imaging    Pharmacy appointment    Other referrals              Treatment Plan Information   OP BREAST TRASTUZUMAB PACLITAXEL WEEKLY   Alexandra Hannah MD   Upcoming Treatment Dates - OP BREAST TRASTUZUMAB PACLITAXEL WEEKLY    9/4/2023       Chemotherapy       trastuzumab-dkst (OGIVRI) 417 mg in sodium chloride 0.9% 250 mL chemo infusion  9/25/2023       Chemotherapy       trastuzumab-dkst (OGIVRI) 417 mg in sodium chloride 0.9% 250 mL chemo infusion  10/16/2023       Chemotherapy       trastuzumab-dkst (OGIVRI) 417 mg in sodium chloride 0.9% 250 mL chemo infusion  11/6/2023       Chemotherapy       trastuzumab-dkst (OGIVRI) 417 mg in sodium chloride 0.9% 250 mL chemo infusion

## 2023-08-14 ENCOUNTER — CLINICAL SUPPORT (OUTPATIENT)
Dept: CARDIOLOGY | Facility: HOSPITAL | Age: 73
End: 2023-08-14
Attending: STUDENT IN AN ORGANIZED HEALTH CARE EDUCATION/TRAINING PROGRAM
Payer: MEDICARE

## 2023-08-14 ENCOUNTER — INFUSION (OUTPATIENT)
Dept: INFUSION THERAPY | Facility: HOSPITAL | Age: 73
End: 2023-08-14
Attending: STUDENT IN AN ORGANIZED HEALTH CARE EDUCATION/TRAINING PROGRAM
Payer: MEDICARE

## 2023-08-14 VITALS — HEIGHT: 66 IN | WEIGHT: 153 LBS | BODY MASS INDEX: 24.59 KG/M2

## 2023-08-14 VITALS
TEMPERATURE: 99 F | RESPIRATION RATE: 16 BRPM | DIASTOLIC BLOOD PRESSURE: 76 MMHG | HEART RATE: 80 BPM | SYSTOLIC BLOOD PRESSURE: 129 MMHG

## 2023-08-14 DIAGNOSIS — C50.912 INVASIVE DUCTAL CARCINOMA OF LEFT BREAST IN FEMALE: ICD-10-CM

## 2023-08-14 DIAGNOSIS — D05.12 DUCTAL CARCINOMA IN SITU (DCIS) OF LEFT BREAST: Primary | ICD-10-CM

## 2023-08-14 LAB
ASCENDING AORTA: 2.52 CM
AV INDEX (PROSTH): 0.84
AV MEAN GRADIENT: 5 MMHG
AV PEAK GRADIENT: 8 MMHG
AV VALVE AREA BY VELOCITY RATIO: 2.29 CM²
AV VALVE AREA: 2.35 CM²
AV VELOCITY RATIO: 0.82
BSA FOR ECHO PROCEDURE: 1.8 M2
CV ECHO LV RWT: 0.45 CM
DOP CALC AO PEAK VEL: 1.41 M/S
DOP CALC AO VTI: 26.3 CM
DOP CALC LVOT AREA: 2.8 CM2
DOP CALC LVOT DIAMETER: 1.89 CM
DOP CALC LVOT PEAK VEL: 1.15 M/S
DOP CALC LVOT STROKE VOLUME: 61.69 CM3
DOP CALCLVOT PEAK VEL VTI: 22 CM
E WAVE DECELERATION TIME: 183.88 MSEC
E/A RATIO: 0.78
E/E' RATIO: 12.5 M/S
ECHO LV POSTERIOR WALL: 0.93 CM (ref 0.6–1.1)
FRACTIONAL SHORTENING: 25 % (ref 28–44)
GLOBAL LONGITUIDAL STRAIN: 17.1 %
INTERVENTRICULAR SEPTUM: 1.11 CM (ref 0.6–1.1)
IVRT: 85.63 MSEC
LA MAJOR: 4.74 CM
LA MINOR: 4.58 CM
LA WIDTH: 3.4 CM
LEFT ATRIUM SIZE: 2.89 CM
LEFT ATRIUM VOLUME INDEX: 21.9 ML/M2
LEFT ATRIUM VOLUME: 38.91 CM3
LEFT INTERNAL DIMENSION IN SYSTOLE: 3.11 CM (ref 2.1–4)
LEFT VENTRICLE DIASTOLIC VOLUME INDEX: 42.43 ML/M2
LEFT VENTRICLE DIASTOLIC VOLUME: 75.53 ML
LEFT VENTRICLE MASS INDEX: 77 G/M2
LEFT VENTRICLE SYSTOLIC VOLUME INDEX: 21.5 ML/M2
LEFT VENTRICLE SYSTOLIC VOLUME: 38.23 ML
LEFT VENTRICULAR INTERNAL DIMENSION IN DIASTOLE: 4.13 CM (ref 3.5–6)
LEFT VENTRICULAR MASS: 137.41 G
LV LATERAL E/E' RATIO: 15 M/S
LV SEPTAL E/E' RATIO: 10.71 M/S
LVOT MG: 2.46 MMHG
LVOT MV: 0.71 CM/S
MV PEAK A VEL: 0.96 M/S
MV PEAK E VEL: 0.75 M/S
MV STENOSIS PRESSURE HALF TIME: 53.32 MS
MV VALVE AREA P 1/2 METHOD: 4.13 CM2
PISA TR MAX VEL: 2.23 M/S
PULM VEIN S/D RATIO: 1.11
PV PEAK D VEL: 0.37 M/S
PV PEAK S VEL: 0.41 M/S
RA MAJOR: 4.37 CM
RA PRESSURE ESTIMATED: 3 MMHG
RA WIDTH: 2.5 CM
RIGHT VENTRICULAR END-DIASTOLIC DIMENSION: 3.24 CM
RIGHT VENTRICULAR LENGTH IN DIASTOLE (APICAL 4-CHAMBER VIEW): 4.68 CM
RV MID DIAMA: 1.94 CM
RV TB RVSP: 5 MMHG
RV TISSUE DOPPLER FREE WALL SYSTOLIC VELOCITY 1 (APICAL 4 CHAMBER VIEW): 15.44 CM/S
SINUS: 3.14 CM
STJ: 2.34 CM
TDI LATERAL: 0.05 M/S
TDI SEPTAL: 0.07 M/S
TDI: 0.06 M/S
TR MAX PG: 20 MMHG
TRICUSPID ANNULAR PLANE SYSTOLIC EXCURSION: 2.16 CM
TV REST PULMONARY ARTERY PRESSURE: 23 MMHG
Z-SCORE OF LEFT VENTRICULAR DIMENSION IN END DIASTOLE: -1.75
Z-SCORE OF LEFT VENTRICULAR DIMENSION IN END SYSTOLE: 0.18

## 2023-08-14 PROCEDURE — 93306 TTE W/DOPPLER COMPLETE: CPT | Mod: PO

## 2023-08-14 PROCEDURE — 93306 TTE W/DOPPLER COMPLETE: CPT | Mod: 26,,, | Performed by: INTERNAL MEDICINE

## 2023-08-14 PROCEDURE — 93306 ECHO (CUPID ONLY): ICD-10-PCS | Mod: 26,,, | Performed by: INTERNAL MEDICINE

## 2023-08-14 PROCEDURE — 25000003 PHARM REV CODE 250: Mod: PN | Performed by: STUDENT IN AN ORGANIZED HEALTH CARE EDUCATION/TRAINING PROGRAM

## 2023-08-14 PROCEDURE — 96413 CHEMO IV INFUSION 1 HR: CPT | Mod: PN

## 2023-08-14 PROCEDURE — 63600175 PHARM REV CODE 636 W HCPCS: Mod: JG,PN | Performed by: STUDENT IN AN ORGANIZED HEALTH CARE EDUCATION/TRAINING PROGRAM

## 2023-08-14 RX ORDER — SODIUM CHLORIDE 0.9 % (FLUSH) 0.9 %
10 SYRINGE (ML) INJECTION
Status: DISCONTINUED | OUTPATIENT
Start: 2023-08-14 | End: 2023-08-14 | Stop reason: HOSPADM

## 2023-08-14 RX ORDER — SODIUM CHLORIDE 0.9 % (FLUSH) 0.9 %
10 SYRINGE (ML) INJECTION
Status: CANCELLED | OUTPATIENT
Start: 2023-08-14

## 2023-08-14 RX ORDER — EPINEPHRINE 0.3 MG/.3ML
0.3 INJECTION SUBCUTANEOUS ONCE AS NEEDED
Status: CANCELLED | OUTPATIENT
Start: 2023-08-14

## 2023-08-14 RX ORDER — EPINEPHRINE 0.3 MG/.3ML
0.3 INJECTION SUBCUTANEOUS ONCE AS NEEDED
Status: DISCONTINUED | OUTPATIENT
Start: 2023-08-14 | End: 2023-08-14 | Stop reason: HOSPADM

## 2023-08-14 RX ORDER — DIPHENHYDRAMINE HYDROCHLORIDE 50 MG/ML
50 INJECTION INTRAMUSCULAR; INTRAVENOUS ONCE AS NEEDED
Status: DISCONTINUED | OUTPATIENT
Start: 2023-08-14 | End: 2023-08-14 | Stop reason: HOSPADM

## 2023-08-14 RX ORDER — HEPARIN 100 UNIT/ML
500 SYRINGE INTRAVENOUS
Status: CANCELLED | OUTPATIENT
Start: 2023-08-14

## 2023-08-14 RX ORDER — DIPHENHYDRAMINE HYDROCHLORIDE 50 MG/ML
50 INJECTION INTRAMUSCULAR; INTRAVENOUS ONCE AS NEEDED
Status: CANCELLED | OUTPATIENT
Start: 2023-08-14

## 2023-08-14 RX ADMIN — TRASTUZUMAB 417 MG: KIT at 12:08

## 2023-08-14 RX ADMIN — SODIUM CHLORIDE: 9 INJECTION, SOLUTION INTRAVENOUS at 11:08

## 2023-08-14 NOTE — PLAN OF CARE
Problem: Adult Inpatient Plan of Care  Goal: Plan of Care Review  Outcome: Ongoing, Progressing  Flowsheets (Taken 8/14/2023 1155)  Plan of Care Reviewed With:   spouse   patient  Goal: Patient-Specific Goal (Individualized)  Outcome: Ongoing, Progressing  Flowsheets (Taken 8/14/2023 1155)  Anxieties, Fears or Concerns: None  Individualized Care Needs: Recliner, dimmed lights, conversation     Problem: Fatigue (Oncology Care)  Goal: Improved Activity Tolerance  Outcome: Ongoing, Progressing  Intervention: Promote Improved Energy  Flowsheets (Taken 8/14/2023 1155)  Fatigue Management:   frequent rest breaks encouraged   paced activity encouraged  Sleep/Rest Enhancement: regular sleep/rest pattern promoted  Activity Management: Ambulated -L4    Patient to Infusion for Ogivri, accompanied by her . Treatment plan reviewed with patient. VSS. Tolerated infusion. Provided with copy of upcoming appointment schedule. Escorted to the front lobby in no distress for discharge to home.

## 2023-08-15 ENCOUNTER — PATIENT MESSAGE (OUTPATIENT)
Dept: ADMINISTRATIVE | Facility: OTHER | Age: 73
End: 2023-08-15
Payer: MEDICARE

## 2023-08-22 ENCOUNTER — PATIENT MESSAGE (OUTPATIENT)
Dept: GYNECOLOGIC ONCOLOGY | Facility: CLINIC | Age: 73
End: 2023-08-22
Payer: MEDICARE

## 2023-08-23 ENCOUNTER — TELEPHONE (OUTPATIENT)
Dept: HEMATOLOGY/ONCOLOGY | Facility: CLINIC | Age: 73
End: 2023-08-23
Payer: MEDICARE

## 2023-08-23 NOTE — NURSING
Spoke with pt.  Explained how Dr. Hernandez's office is not on [facility] and if she has questions regarding her surgery, she should call her private office.  Contact information provided.  Encouraged her to call me with any questions or concerns.  She thanked me and verbalized understanding.

## 2023-09-01 ENCOUNTER — OFFICE VISIT (OUTPATIENT)
Dept: HEMATOLOGY/ONCOLOGY | Facility: CLINIC | Age: 73
End: 2023-09-01
Payer: MEDICARE

## 2023-09-01 ENCOUNTER — LAB VISIT (OUTPATIENT)
Dept: LAB | Facility: HOSPITAL | Age: 73
End: 2023-09-01
Attending: STUDENT IN AN ORGANIZED HEALTH CARE EDUCATION/TRAINING PROGRAM
Payer: MEDICARE

## 2023-09-01 VITALS
SYSTOLIC BLOOD PRESSURE: 122 MMHG | RESPIRATION RATE: 18 BRPM | TEMPERATURE: 97 F | OXYGEN SATURATION: 95 % | DIASTOLIC BLOOD PRESSURE: 7 MMHG | BODY MASS INDEX: 24.87 KG/M2 | HEART RATE: 88 BPM | HEIGHT: 66 IN | WEIGHT: 154.75 LBS

## 2023-09-01 DIAGNOSIS — C50.912 INVASIVE DUCTAL CARCINOMA OF LEFT BREAST IN FEMALE: ICD-10-CM

## 2023-09-01 DIAGNOSIS — D05.12 DUCTAL CARCINOMA IN SITU (DCIS) OF LEFT BREAST: Primary | ICD-10-CM

## 2023-09-01 LAB
ALBUMIN SERPL BCP-MCNC: 3.8 G/DL (ref 3.5–5.2)
ALP SERPL-CCNC: 54 U/L (ref 55–135)
ALT SERPL W/O P-5'-P-CCNC: 27 U/L (ref 10–44)
ANION GAP SERPL CALC-SCNC: 8 MMOL/L (ref 8–16)
AST SERPL-CCNC: 20 U/L (ref 10–40)
BASOPHILS # BLD AUTO: 0.03 K/UL (ref 0–0.2)
BASOPHILS NFR BLD: 0.5 % (ref 0–1.9)
BILIRUB SERPL-MCNC: 0.6 MG/DL (ref 0.1–1)
BUN SERPL-MCNC: 17 MG/DL (ref 8–23)
CALCIUM SERPL-MCNC: 9.1 MG/DL (ref 8.7–10.5)
CHLORIDE SERPL-SCNC: 109 MMOL/L (ref 95–110)
CO2 SERPL-SCNC: 25 MMOL/L (ref 23–29)
CREAT SERPL-MCNC: 0.8 MG/DL (ref 0.5–1.4)
DIFFERENTIAL METHOD: ABNORMAL
EOSINOPHIL # BLD AUTO: 0.3 K/UL (ref 0–0.5)
EOSINOPHIL NFR BLD: 4.2 % (ref 0–8)
ERYTHROCYTE [DISTWIDTH] IN BLOOD BY AUTOMATED COUNT: 14.3 % (ref 11.5–14.5)
EST. GFR  (NO RACE VARIABLE): >60 ML/MIN/1.73 M^2
GLUCOSE SERPL-MCNC: 101 MG/DL (ref 70–110)
HCT VFR BLD AUTO: 37.7 % (ref 37–48.5)
HGB BLD-MCNC: 12.2 G/DL (ref 12–16)
IMM GRANULOCYTES # BLD AUTO: 0.02 K/UL (ref 0–0.04)
IMM GRANULOCYTES NFR BLD AUTO: 0.3 % (ref 0–0.5)
LYMPHOCYTES # BLD AUTO: 2.1 K/UL (ref 1–4.8)
LYMPHOCYTES NFR BLD: 32.2 % (ref 18–48)
MAGNESIUM SERPL-MCNC: 1.8 MG/DL (ref 1.6–2.6)
MCH RBC QN AUTO: 32 PG (ref 27–31)
MCHC RBC AUTO-ENTMCNC: 32.4 G/DL (ref 32–36)
MCV RBC AUTO: 99 FL (ref 82–98)
MONOCYTES # BLD AUTO: 0.6 K/UL (ref 0.3–1)
MONOCYTES NFR BLD: 9 % (ref 4–15)
NEUTROPHILS # BLD AUTO: 3.5 K/UL (ref 1.8–7.7)
NEUTROPHILS NFR BLD: 53.8 % (ref 38–73)
NRBC BLD-RTO: 0 /100 WBC
PLATELET # BLD AUTO: 183 K/UL (ref 150–450)
PMV BLD AUTO: 9.4 FL (ref 9.2–12.9)
POTASSIUM SERPL-SCNC: 4.1 MMOL/L (ref 3.5–5.1)
PROT SERPL-MCNC: 6.5 G/DL (ref 6–8.4)
RBC # BLD AUTO: 3.81 M/UL (ref 4–5.4)
SODIUM SERPL-SCNC: 142 MMOL/L (ref 136–145)
WBC # BLD AUTO: 6.46 K/UL (ref 3.9–12.7)

## 2023-09-01 PROCEDURE — 99999 PR PBB SHADOW E&M-EST. PATIENT-LVL IV: CPT | Mod: PBBFAC,,, | Performed by: INTERNAL MEDICINE

## 2023-09-01 PROCEDURE — 36415 COLL VENOUS BLD VENIPUNCTURE: CPT | Mod: PN | Performed by: STUDENT IN AN ORGANIZED HEALTH CARE EDUCATION/TRAINING PROGRAM

## 2023-09-01 PROCEDURE — 99999 PR PBB SHADOW E&M-EST. PATIENT-LVL IV: ICD-10-PCS | Mod: PBBFAC,,, | Performed by: INTERNAL MEDICINE

## 2023-09-01 PROCEDURE — 99215 PR OFFICE/OUTPT VISIT, EST, LEVL V, 40-54 MIN: ICD-10-PCS | Mod: S$PBB,,, | Performed by: INTERNAL MEDICINE

## 2023-09-01 PROCEDURE — 85025 COMPLETE CBC W/AUTO DIFF WBC: CPT | Mod: PN | Performed by: STUDENT IN AN ORGANIZED HEALTH CARE EDUCATION/TRAINING PROGRAM

## 2023-09-01 PROCEDURE — 83735 ASSAY OF MAGNESIUM: CPT | Mod: PN | Performed by: STUDENT IN AN ORGANIZED HEALTH CARE EDUCATION/TRAINING PROGRAM

## 2023-09-01 PROCEDURE — 99215 OFFICE O/P EST HI 40 MIN: CPT | Mod: S$PBB,,, | Performed by: INTERNAL MEDICINE

## 2023-09-01 PROCEDURE — 80053 COMPREHEN METABOLIC PANEL: CPT | Mod: PN | Performed by: STUDENT IN AN ORGANIZED HEALTH CARE EDUCATION/TRAINING PROGRAM

## 2023-09-01 PROCEDURE — 99214 OFFICE O/P EST MOD 30 MIN: CPT | Mod: PBBFAC,PN | Performed by: INTERNAL MEDICINE

## 2023-09-01 RX ORDER — HEPARIN 100 UNIT/ML
500 SYRINGE INTRAVENOUS
Status: CANCELLED | OUTPATIENT
Start: 2023-09-04

## 2023-09-01 RX ORDER — EPINEPHRINE 0.3 MG/.3ML
0.3 INJECTION SUBCUTANEOUS ONCE AS NEEDED
Status: CANCELLED | OUTPATIENT
Start: 2023-09-04

## 2023-09-01 RX ORDER — SODIUM CHLORIDE 0.9 % (FLUSH) 0.9 %
10 SYRINGE (ML) INJECTION
Status: CANCELLED | OUTPATIENT
Start: 2023-09-04

## 2023-09-01 RX ORDER — DIPHENHYDRAMINE HYDROCHLORIDE 50 MG/ML
50 INJECTION INTRAMUSCULAR; INTRAVENOUS ONCE AS NEEDED
Status: CANCELLED | OUTPATIENT
Start: 2023-09-04

## 2023-09-01 NOTE — Clinical Note
Please bring her back to see me on the 21st.  Please note that I am cancelling all further chemotherapy appointments.  If I am overbooked, please book her at the end of the day, at 3 p.m.

## 2023-09-01 NOTE — Clinical Note
See me in 3 weeks; we will most likely discontinue chemotherapy at that point Island Pedicle Flap With Canthal Suspension Text: The defect edges were debeveled with a #15 scalpel blade.  Given the location of the defect, shape of the defect and the proximity to free margins an island pedicle advancement flap was deemed most appropriate.  Using a sterile surgical marker, an appropriate advancement flap was drawn incorporating the defect, outlining the appropriate donor tissue and placing the expected incisions within the relaxed skin tension lines where possible. The area thus outlined was incised deep to adipose tissue with a #15 scalpel blade.  The skin margins were undermined to an appropriate distance in all directions around the primary defect and laterally outward around the island pedicle utilizing iris scissors.  There was minimal undermining beneath the pedicle flap. A suspension suture was placed in the canthal tendon to prevent tension and prevent ectropion.

## 2023-09-01 NOTE — PROGRESS NOTES
Subjective     Patient ID: Margaret Rouse is a 72 y.o. female.    Chief Complaint: Invasive ductal carcinoma of left breast in female    HPI  Mrs Rouse presents today for follow-up and to continue her chemotherapy with Taxol Herceptin.  This is the 1st time that I am seeing her.  She was previously followed by Dr. Ramirez.  I will summarize her course as follows:  Briefly, she is a 72-year-old female who had a BI-RADS 4 mammogram and underwent a breast biopsy on 02/20/2023.  The biopsy showed DCIS with a 0.5 mm focus of invasion.  The amount of invasive disease was not sufficient for receptors to be run.  On April 20, 2023 she underwent a mastectomy and sentinel lymph node biopsy.  The pathology report indicates that she had DCIS with a 1.5 mm node negative carcinoma that was ER negative, OH negative, HER2 positive.  Two sentinel lymph nodes were negative.  Resection margins were clear.  She has so far received 12 weeks of weekly Taxol Herceptin and she is on Herceptin maintenance. She is due for her second herceptin infusion on 9/5/2023.  A recent echocardiogram in mid August showed an EF of 60-65%.   Today's labs are as follows:  WBCs are 6400 per cubic mm, hemoglobin 12.2 grams/deciliter, hematocrit 37.7% and platelets 183 K.  Bilirubin is 0.6 mg/dL and transaminases are normal  Creatinine is 0.8 mg/dL    Review of Systems  Overall she feels well and she has no complaints.  ECOG PS is 1.. She denies any anxiety, depression, easy bruising, fevers, chills, night  sweats, weight loss, nausea, vomiting, diarrhea, constipation, diplopia,  blurred vision, headache, chest pain, palpitations, shortness of breath, breast pain, abdominal pain, extremity pain, or difficulty ambulating.  The remainder of the ten-point ROS, including general, skin, lymph, H/N, cardiorespiratory, GI, , Neuro, Endocrine, and psychiatric is negative.      Physical Exam       She is alert, oriented to time, place, person, pleasant, well       nourished, in no acute physical distress.  She is here with her .                                    VITAL SIGNS:  Reviewed                                      HEENT:  Alopecia is noted.  There are no nasal, oral, lip, gingival, auricular, lid,    or conjunctival lesions.  Mucosae are moist and pink, and there is no        thrush.  Pupils are equal, reactive to light and accommodation.              Extraocular muscle movements are intact.  Dentition is good.  There is no frontal or maxillary tenderness.                                     NECK:  Supple without JVD, adenopathy, or thyromegaly.                       LUNGS:  Clear to auscultation without wheezing, rales, or rhonchi.           CARDIOVASCULAR:  Reveals an S1, S2, no murmurs, no rubs, no gallops.         ABDOMEN:  Soft, nontender, without organomegaly.  Bowel sounds are    present.                                                                     EXTREMITIES:  No cyanosis, clubbing, or edema.                               BREASTS:  She is status post left nipple sparing mastectomy with a prosthesis in place.  Her incision has healed nicely and there are no palpable masses    There are no masses in the right breast.                                        LYMPHATIC:  There is no cervical, axillary, or supraclavicular adenopathy.   SKIN:  Warm and moist, without petechiae, rashes, induration, or ecchymoses.           NEUROLOGIC:  DTRs are 0-1+ bilaterally, symmetrical, motor function is 5/5,  and cranial nerves are  within normal limits.     ASSESSMENT  T1a N0 M0 ER negative NC negative HER2 positive carcinoma status post mastectomy, status post 12 cycles of weekly Taxol Herceptin here for Herceptin maintenance      PLAN  I had a very long discussion with Mrs. Rouse and her .  I explained to her that she has a very small tumor and a very good prognosis.  We discussed the option of discontinuing treatment at this point since the benefit  of continuing the Herceptin through April 2024 is very small.  I offered to present her case to our main campus breast tumor board and she was agreeable.  At this point we will proceed as follows:  She may proceed with the Herceptin administration which is scheduled for 4 days from now  I will present her case at our weekly Tumor Board.  I suspect that the recommendation will be to discontinue Herceptin at this point  I will see her again in 3 weeks to discuss the tumor board recommendations  We also had a long discussion about what constitutes an appropriate follow-up post primary therapy.  Explained to her that she does not need any lab work or radiologic studies other than the yearly mammogram.  She voiced understanding.    Her multiple questions and her 's questions were answered to her satisfaction.  I spent approximately 45 minutes reviewing the available records and evaluating the patient, out of which over 50% of the time was spent face to face with the patient in counseling and coordinating this patient's care.      9/15/2023 ADDENDUM  Earlier this week I presented her case at our weekly Breast Cancer Tumor Board.  The recommendation was to not administer any further chemotherapy.  I will see the patient next week to discuss.  Further chemotherapy to be held at this point.

## 2023-09-05 ENCOUNTER — DOCUMENTATION ONLY (OUTPATIENT)
Dept: INFUSION THERAPY | Facility: HOSPITAL | Age: 73
End: 2023-09-05
Payer: MEDICARE

## 2023-09-05 ENCOUNTER — INFUSION (OUTPATIENT)
Dept: INFUSION THERAPY | Facility: HOSPITAL | Age: 73
End: 2023-09-05
Attending: STUDENT IN AN ORGANIZED HEALTH CARE EDUCATION/TRAINING PROGRAM
Payer: MEDICARE

## 2023-09-05 VITALS
RESPIRATION RATE: 16 BRPM | WEIGHT: 153 LBS | DIASTOLIC BLOOD PRESSURE: 73 MMHG | TEMPERATURE: 98 F | HEIGHT: 66 IN | BODY MASS INDEX: 24.59 KG/M2 | HEART RATE: 73 BPM | SYSTOLIC BLOOD PRESSURE: 122 MMHG

## 2023-09-05 DIAGNOSIS — D05.12 DUCTAL CARCINOMA IN SITU (DCIS) OF LEFT BREAST: Primary | ICD-10-CM

## 2023-09-05 DIAGNOSIS — C50.912 INVASIVE DUCTAL CARCINOMA OF LEFT BREAST IN FEMALE: ICD-10-CM

## 2023-09-05 PROCEDURE — 63600175 PHARM REV CODE 636 W HCPCS: Mod: JG,PN | Performed by: INTERNAL MEDICINE

## 2023-09-05 PROCEDURE — 25000003 PHARM REV CODE 250: Mod: PN | Performed by: INTERNAL MEDICINE

## 2023-09-05 PROCEDURE — 96413 CHEMO IV INFUSION 1 HR: CPT | Mod: PN

## 2023-09-05 RX ORDER — DIPHENHYDRAMINE HYDROCHLORIDE 50 MG/ML
50 INJECTION INTRAMUSCULAR; INTRAVENOUS ONCE AS NEEDED
Status: DISCONTINUED | OUTPATIENT
Start: 2023-09-05 | End: 2023-09-05 | Stop reason: HOSPADM

## 2023-09-05 RX ORDER — SODIUM CHLORIDE 0.9 % (FLUSH) 0.9 %
10 SYRINGE (ML) INJECTION
Status: DISCONTINUED | OUTPATIENT
Start: 2023-09-05 | End: 2023-09-05 | Stop reason: HOSPADM

## 2023-09-05 RX ORDER — EPINEPHRINE 0.3 MG/.3ML
0.3 INJECTION SUBCUTANEOUS ONCE AS NEEDED
Status: DISCONTINUED | OUTPATIENT
Start: 2023-09-05 | End: 2023-09-05 | Stop reason: HOSPADM

## 2023-09-05 RX ADMIN — TRASTUZUMAB 417 MG: KIT at 12:09

## 2023-09-05 RX ADMIN — SODIUM CHLORIDE: 9 INJECTION, SOLUTION INTRAVENOUS at 12:09

## 2023-09-05 NOTE — PLAN OF CARE
Problem: Adult Inpatient Plan of Care  Goal: Plan of Care Review  Outcome: Ongoing, Progressing  Flowsheets (Taken 9/5/2023 1233)  Plan of Care Reviewed With:   patient   spouse  Goal: Patient-Specific Goal (Individualized)  Outcome: Ongoing, Progressing  Flowsheets (Taken 9/5/2023 1233)  Anxieties, Fears or Concerns: None  Individualized Care Needs: Recliner, warm blanket, dimmed lights, conversation     Problem: Fatigue (Oncology Care)  Goal: Improved Activity Tolerance  Outcome: Ongoing, Progressing  Intervention: Promote Improved Energy  Flowsheets (Taken 9/5/2023 1200)  Fatigue Management:   frequent rest breaks encouraged   paced activity encouraged  Sleep/Rest Enhancement: regular sleep/rest pattern promoted  Activity Management: Ambulated -L4   Patient to Infusion for Ogivri, accompanied by her . Treatment plan reviewed with patient. VSS. Tolerated infusion. Provided with copy of upcoming appointment schedule. Escorted to the front lobby in no distress for discharge to home.

## 2023-09-05 NOTE — PROGRESS NOTES
Oncology Nutrition       Weight Check   Chart reviewed. Weight check completed on pt.     CBW:153#  Weight at initiation of tx:154#    Wt Readings from Last 10 Encounters:   09/05/23 69.4 kg (153 lb)   09/01/23 70.2 kg (154 lb 12.2 oz)   08/14/23 69.4 kg (153 lb)   08/11/23 69.8 kg (153 lb 14.1 oz)   08/04/23 70.1 kg (154 lb 8.7 oz)   08/04/23 69.5 kg (153 lb 3.5 oz)   07/31/23 69.5 kg (153 lb 3.5 oz)   07/28/23 69.5 kg (153 lb 3.5 oz)   07/24/23 70.4 kg (155 lb 3.3 oz)   07/21/23 68.8 kg (151 lb 10.8 oz)          RD plan of care: Weight is currently 153#. No significant change at this time. Per nursing nutrition risk report, pt denies any nutritional concerns or challenges at this time. RD to continue to monitor prn; no nutritional interventions are needed at this time.     Britt Laureano, MICHELLEN, LDN  09/05/2023  11:51 AM

## 2023-09-12 ENCOUNTER — TUMOR BOARD CONFERENCE (OUTPATIENT)
Dept: SURGERY | Facility: CLINIC | Age: 73
End: 2023-09-12
Payer: MEDICARE

## 2023-09-12 NOTE — PROGRESS NOTES
Oncology History   Invasive ductal carcinoma of left breast in female   2/27/2023 Cancer Staged    Staging form: Breast, AJCC 8th Edition  - Clinical stage from 2/27/2023: cT1mi, cN0, cM0, G3, ER+, KY-, HER2: Not Assessed     4/20/2023 Initial Diagnosis    Invasive ductal carcinoma of left breast in female     4/20/2023 Biopsy    LEFT BREAST: INVASIVE DUCTAL CARCINOMA   GRADE: INTERMEDIATE   ER: NEGATIVE, KY: NEGATIVE, HER2: POSITIVE      4/20/2023 Breast Tumor Markers    DCIS  Estrogen: Positive  Progesterone: Negative  HER2: Not assessed     IDC  Estrogen: Negative  Progesterone: Negative  HER2: Positive  Ki67: 28     5/1/2023 Cancer Staged    Staging form: Breast, AJCC 8th Edition  - Pathologic stage from 5/1/2023: Stage IA (pT1a, pN0(sn), cM0, G2, ER-, KY-, HER2+)     5/22/2023 -  Chemotherapy    Treatment Summary   Plan Name: OP BREAST TRASTUZUMAB PACLITAXEL WEEKLY  Treatment Goal: Curative  Status: Active  Start Date: 5/22/2023  End Date: 4/22/2024 (Planned)  Provider: Alexandra Hannah MD  Chemotherapy: PACLitaxeL (TAXOL) 80 mg/m2 = 144 mg in sodium chloride 0.9% 250 mL chemo infusion, 80 mg/m2 = 144 mg, Intravenous, Clinic/HOD 1 time, 12 of 12 cycles  Administration: 144 mg (5/22/2023), 144 mg (5/29/2023), 144 mg (6/5/2023), 144 mg (6/12/2023), 144 mg (6/19/2023), 144 mg (6/26/2023), 144 mg (7/3/2023), 144 mg (7/10/2023), 144 mg (7/17/2023), 144 mg (7/24/2023), 144 mg (7/31/2023), 144 mg (8/7/2023)  trastuzumab-dkst (OGIVRI) 278 mg in sodium chloride 0.9% 298.2 mL chemo infusion, 4 mg/kg = 278 mg, Intravenous, Clinic/HOD 1 time, 14 of 25 cycles  Administration: 278 mg (5/22/2023), 139 mg (5/29/2023), 417 mg (8/14/2023), 136 mg (6/5/2023), 136 mg (6/12/2023), 136 mg (6/19/2023), 136 mg (6/26/2023), 136 mg (7/3/2023), 136 mg (7/10/2023), 136 mg (7/17/2023), 136 mg (7/24/2023), 139 mg (7/31/2023), 139 mg (8/7/2023), 417 mg (9/5/2023)     9/12/2023 Tumor Conference    1.5 mm of Her2 + microinvasive ductal carcinoma  on surgical pathology. Team discussed adjuvant treatment options. Patient has completed Taxol post operatively and is now being treated with Herceptin. Team does not feel she needs to proceed with this treatment course upon full review of her case.         Ductal carcinoma in situ (DCIS) of left breast   2/13/2023 Initial Diagnosis    Ductal carcinoma in situ (DCIS) of left breast     4/20/2023 Breast Tumor Markers    DCIS  Estrogen: Positive  Progesterone: Negative  HER2: Not assessed     IDC  Estrogen: Negative  Progesterone: Negative  HER2: Positive  Ki67: 28     5/22/2023 -  Chemotherapy    Treatment Summary   Plan Name: OP BREAST TRASTUZUMAB PACLITAXEL WEEKLY  Treatment Goal: Curative  Status: Active  Start Date: 5/22/2023  End Date: 4/22/2024 (Planned)  Provider: Alexandra Hannah MD  Chemotherapy: PACLitaxeL (TAXOL) 80 mg/m2 = 144 mg in sodium chloride 0.9% 250 mL chemo infusion, 80 mg/m2 = 144 mg, Intravenous, Clinic/HOD 1 time, 12 of 12 cycles  Administration: 144 mg (5/22/2023), 144 mg (5/29/2023), 144 mg (6/5/2023), 144 mg (6/12/2023), 144 mg (6/19/2023), 144 mg (6/26/2023), 144 mg (7/3/2023), 144 mg (7/10/2023), 144 mg (7/17/2023), 144 mg (7/24/2023), 144 mg (7/31/2023), 144 mg (8/7/2023)  trastuzumab-dkst (OGIVRI) 278 mg in sodium chloride 0.9% 298.2 mL chemo infusion, 4 mg/kg = 278 mg, Intravenous, Clinic/HOD 1 time, 14 of 25 cycles  Administration: 278 mg (5/22/2023), 139 mg (5/29/2023), 417 mg (8/14/2023), 136 mg (6/5/2023), 136 mg (6/12/2023), 136 mg (6/19/2023), 136 mg (6/26/2023), 136 mg (7/3/2023), 136 mg (7/10/2023), 136 mg (7/17/2023), 136 mg (7/24/2023), 139 mg (7/31/2023), 139 mg (8/7/2023), 417 mg (9/5/2023)     9/12/2023 Tumor Conference    1.5 mm of Her2 + microinvasive ductal carcinoma on surgical pathology. Team discussed adjuvant treatment options. Patient has completed Taxol post operatively and is now being treated with Herceptin. Team does not feel she needs to proceed with this  treatment course upon full review of her case.

## 2023-09-15 ENCOUNTER — PATIENT MESSAGE (OUTPATIENT)
Dept: ADMINISTRATIVE | Facility: OTHER | Age: 73
End: 2023-09-15
Payer: MEDICARE

## 2023-09-19 ENCOUNTER — PATIENT MESSAGE (OUTPATIENT)
Dept: ADMINISTRATIVE | Facility: OTHER | Age: 73
End: 2023-09-19
Payer: MEDICARE

## 2023-09-21 ENCOUNTER — PATIENT MESSAGE (OUTPATIENT)
Dept: ADMINISTRATIVE | Facility: OTHER | Age: 73
End: 2023-09-21
Payer: MEDICARE

## 2023-09-28 ENCOUNTER — PATIENT MESSAGE (OUTPATIENT)
Dept: ADMINISTRATIVE | Facility: OTHER | Age: 73
End: 2023-09-28
Payer: MEDICARE

## 2023-10-02 ENCOUNTER — PATIENT MESSAGE (OUTPATIENT)
Dept: ADMINISTRATIVE | Facility: OTHER | Age: 73
End: 2023-10-02
Payer: MEDICARE

## 2023-10-02 ENCOUNTER — PATIENT MESSAGE (OUTPATIENT)
Dept: HEMATOLOGY/ONCOLOGY | Facility: CLINIC | Age: 73
End: 2023-10-02
Payer: MEDICARE

## 2023-10-05 ENCOUNTER — PATIENT MESSAGE (OUTPATIENT)
Dept: GYNECOLOGIC ONCOLOGY | Facility: CLINIC | Age: 73
End: 2023-10-05
Payer: MEDICARE

## 2023-10-17 ENCOUNTER — PATIENT MESSAGE (OUTPATIENT)
Dept: HEMATOLOGY/ONCOLOGY | Facility: CLINIC | Age: 73
End: 2023-10-17
Payer: MEDICARE

## 2023-10-19 ENCOUNTER — OFFICE VISIT (OUTPATIENT)
Dept: HEMATOLOGY/ONCOLOGY | Facility: CLINIC | Age: 73
End: 2023-10-19
Payer: MEDICARE

## 2023-10-19 ENCOUNTER — PATIENT MESSAGE (OUTPATIENT)
Dept: ADMINISTRATIVE | Facility: OTHER | Age: 73
End: 2023-10-19
Payer: MEDICARE

## 2023-10-19 VITALS
DIASTOLIC BLOOD PRESSURE: 79 MMHG | HEIGHT: 66 IN | HEART RATE: 83 BPM | WEIGHT: 151.25 LBS | SYSTOLIC BLOOD PRESSURE: 133 MMHG | OXYGEN SATURATION: 99 % | BODY MASS INDEX: 24.31 KG/M2 | TEMPERATURE: 98 F

## 2023-10-19 DIAGNOSIS — L29.9 PRURITUS: Primary | ICD-10-CM

## 2023-10-19 PROCEDURE — 99999 PR PBB SHADOW E&M-EST. PATIENT-LVL IV: CPT | Mod: PBBFAC,,, | Performed by: INTERNAL MEDICINE

## 2023-10-19 PROCEDURE — 99215 PR OFFICE/OUTPT VISIT, EST, LEVL V, 40-54 MIN: ICD-10-PCS | Mod: S$PBB,,, | Performed by: INTERNAL MEDICINE

## 2023-10-19 PROCEDURE — 99214 OFFICE O/P EST MOD 30 MIN: CPT | Mod: PBBFAC,PN | Performed by: INTERNAL MEDICINE

## 2023-10-19 PROCEDURE — 99999 PR PBB SHADOW E&M-EST. PATIENT-LVL IV: ICD-10-PCS | Mod: PBBFAC,,, | Performed by: INTERNAL MEDICINE

## 2023-10-19 PROCEDURE — 99215 OFFICE O/P EST HI 40 MIN: CPT | Mod: S$PBB,,, | Performed by: INTERNAL MEDICINE

## 2023-10-19 RX ORDER — HYDROXYZINE HYDROCHLORIDE 25 MG/1
25 TABLET, FILM COATED ORAL 3 TIMES DAILY PRN
Qty: 60 TABLET | Refills: 2 | Status: SHIPPED | OUTPATIENT
Start: 2023-10-19 | End: 2024-02-08

## 2024-03-04 ENCOUNTER — OFFICE VISIT (OUTPATIENT)
Dept: HEMATOLOGY/ONCOLOGY | Facility: CLINIC | Age: 74
End: 2024-03-04
Payer: MEDICARE

## 2024-03-04 VITALS
SYSTOLIC BLOOD PRESSURE: 147 MMHG | WEIGHT: 155.88 LBS | OXYGEN SATURATION: 98 % | HEART RATE: 80 BPM | BODY MASS INDEX: 25.05 KG/M2 | TEMPERATURE: 98 F | DIASTOLIC BLOOD PRESSURE: 67 MMHG | RESPIRATION RATE: 16 BRPM | HEIGHT: 66 IN

## 2024-03-04 DIAGNOSIS — C50.912 INVASIVE DUCTAL CARCINOMA OF LEFT BREAST IN FEMALE: Primary | ICD-10-CM

## 2024-03-04 PROCEDURE — 99214 OFFICE O/P EST MOD 30 MIN: CPT | Mod: PBBFAC,PN | Performed by: INTERNAL MEDICINE

## 2024-03-04 PROCEDURE — 99999 PR PBB SHADOW E&M-EST. PATIENT-LVL IV: CPT | Mod: PBBFAC,,, | Performed by: INTERNAL MEDICINE

## 2024-03-04 PROCEDURE — 99214 OFFICE O/P EST MOD 30 MIN: CPT | Mod: S$PBB,,, | Performed by: INTERNAL MEDICINE

## 2024-03-04 NOTE — PROGRESS NOTES
"Subjective     Patient ID: Margaret Rouse is a 73 y.o. female.    Chief Complaint: Ductal carcinoma in situ (DCIS) of left breast    HPI  Mrs Rouse presents today for follow-up.  I had seen her once in September 1, 2023 and again in October 2023   I will summarize her course as follows:    Briefly, she is a 73-year-old female who had a BI-RADS 4 mammogram and underwent a breast biopsy on 02/20/2023.  The biopsy showed DCIS with a 0.5 mm focus of invasion.  The amount of invasive disease was not sufficient for receptors to be run.  On April 20, 2023 she underwent a mastectomy and sentinel lymph node biopsy.  The pathology report indicates that she had DCIS with a 1.5 mm node negative carcinoma that was ER negative, [place] negative, HER2 positive.  Two sentinel lymph nodes were negative.  Resection margins were clear.  She received 12 weeks of weekly Taxol Herceptin and was on Herceptin maintenance when the decision was made to hold any further treatment.  She is currently being followed expectantly and she is doing well.    Since her last visit with me she underwent a D&C due to thickened endometrium".  The biopsy was unremarkable.      ROS: Overall she feels well, and her only complaint is mild intermittent tingling of her toes, presumably secondary to taxane induced neuropathy.  She also mentions occasional pains in the reconstruction.  Her  ECOG PS is 1.  She denies any anxiety, depression, easy bruising, fevers, chills, night  sweats, weight loss, nausea, vomiting, diarrhea, constipation, diplopia,  blurred vision, headache, chest pain, palpitations, shortness of breath, breast pain, abdominal pain, extremity pain, or difficulty ambulating.  The remainder of the ten-point ROS, including general, skin, lymph, H/N, cardiorespiratory, GI, , Neuro, Endocrine, and psychiatric is negative.      Physical Exam       She is alert, oriented to time, place, person, pleasant, well      nourished, in no acute physical " distress.  She is here with her .                                    VITAL SIGNS:  Reviewed                                      HEENT:  Normal.  There are no nasal, oral, lip, gingival, auricular, lid,    or conjunctival lesions.  Mucosae are moist and pink, and there is no        thrush.  Pupils are equal, reactive to light and accommodation.              Extraocular muscle movements are intact.  Dentition is good.  There is no frontal or maxillary tenderness.                                     NECK:  Supple without JVD, adenopathy, or thyromegaly.                       LUNGS:  Clear to auscultation without wheezing, rales, or rhonchi.           CARDIOVASCULAR:  Reveals an S1, S2, no murmurs, no rubs, no gallops.         ABDOMEN:  Soft, nontender, without organomegaly.  Bowel sounds are    present.                                                                     EXTREMITIES:  No cyanosis, clubbing, or edema.                               BREASTS:  She is status post left nipple sparing mastectomy with a prosthesis in place.  Her incision has healed nicely and there are no palpable masses    There are no masses in the right breast.                                        LYMPHATIC:  There is no cervical, axillary, or supraclavicular adenopathy.   SKIN:  Warm and moist, without petechiae, rashes, induration, or ecchymoses.           NEUROLOGIC:  DTRs are 0-1+ bilaterally, symmetrical, motor function is 5/5,  and cranial nerves are  within normal limits.     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 Rouse and her .  She is doing well and remains in remission.  I again explained to her that her chance of a cure is more than 95%.  We went over the guidelines, and I shared with her the [facility] March 1, 2013 paper  delineating the standard of care for follow-up.  I will see her again in 4 months and we will repeat her mammogram in November 2024    Her multiple questions and her 's questions were answered to her satisfaction.  I spent approximately 30 minutes reviewing the available records and evaluating the patient, out of which over 50% of the time was spent face to face with the patient in counseling and coordinating this patient's care.

## 2024-07-15 ENCOUNTER — OFFICE VISIT (OUTPATIENT)
Dept: HEMATOLOGY/ONCOLOGY | Facility: CLINIC | Age: 74
End: 2024-07-15
Payer: MEDICARE

## 2024-07-15 VITALS
BODY MASS INDEX: 24.66 KG/M2 | OXYGEN SATURATION: 97 % | HEART RATE: 85 BPM | TEMPERATURE: 98 F | HEIGHT: 66 IN | SYSTOLIC BLOOD PRESSURE: 136 MMHG | WEIGHT: 153.44 LBS | RESPIRATION RATE: 18 BRPM | DIASTOLIC BLOOD PRESSURE: 78 MMHG

## 2024-07-15 DIAGNOSIS — C50.912 INVASIVE DUCTAL CARCINOMA OF LEFT BREAST IN FEMALE: Primary | ICD-10-CM

## 2024-07-15 PROCEDURE — 99214 OFFICE O/P EST MOD 30 MIN: CPT | Mod: PBBFAC,PN | Performed by: INTERNAL MEDICINE

## 2024-07-15 PROCEDURE — 99214 OFFICE O/P EST MOD 30 MIN: CPT | Mod: S$PBB,,, | Performed by: INTERNAL MEDICINE

## 2024-07-15 PROCEDURE — 99999 PR PBB SHADOW E&M-EST. PATIENT-LVL IV: CPT | Mod: PBBFAC,,, | Performed by: INTERNAL MEDICINE

## 2024-07-15 NOTE — PROGRESS NOTES
Subjective     Patient ID: Margaret Rouse is a 73 y.o. female.    Chief Complaint: No chief complaint on file.    HPI  Mrs Rouse presents today for follow-up.  I had seen her once in September 1, 2023 and again in October 2023 and in early March 2024.   I will summarize her course as follows:    Briefly, she is a 73-year-old female who had a BI-RADS 4 mammogram and underwent a breast biopsy on 02/20/2023.  The biopsy showed DCIS with a 0.5 mm focus of invasion.  The amount of invasive disease was not sufficient for receptors to be run.  On April 20, 2023 she underwent a mastectomy and sentinel lymph node biopsy.  The pathology report indicates that she had DCIS with a 1.5 mm node negative carcinoma that was ER negative, [place] negative, HER2 positive.  Two sentinel lymph nodes were negative.  Resection margins were clear.  She received 12 weeks of weekly Taxol Herceptin and was on Herceptin maintenance when the decision was made to hold any further treatment.  She is currently being followed expectantly and she is doing well.  In May 2024 she had a breast MRI which was read as BI-RADS 2    ROS: Overall she feels well, and states her neuropathy has almost completely resolved.  She again mentions occasional pains in the reconstruction.  Her  ECOG PS is 1.  She denies any anxiety, depression, easy bruising, fevers, chills, night  sweats, weight loss, nausea, vomiting, diarrhea, constipation, diplopia,  blurred vision, headache, chest pain, palpitations, shortness of breath, breast pain, abdominal pain, extremity pain, or difficulty ambulating.  The remainder of the ten-point ROS, including general, skin, lymph, H/N, cardiorespiratory, GI, , Neuro, Endocrine, and psychiatric is negative.      Physical Exam       She is alert, oriented to time, place, person, pleasant, well      nourished, in no acute physical distress.  She is here with her .                                    VITAL SIGNS:  Reviewed                                       HEENT:  Normal.  There are no nasal, oral, lip, gingival, auricular, lid,    or conjunctival lesions.  Mucosae are moist and pink, and there is no        thrush.  Pupils are equal, reactive to light and accommodation.              Extraocular muscle movements are intact.  Dentition is good.  There is no frontal or maxillary tenderness.                                     NECK:  Supple without JVD, adenopathy, or thyromegaly.                       LUNGS:  Clear to auscultation without wheezing, rales, or rhonchi.           CARDIOVASCULAR:  Reveals an S1, S2, no murmurs, no rubs, no gallops.         ABDOMEN:  Soft, nontender, without organomegaly.  Bowel sounds are    present.                                                                     EXTREMITIES:  No cyanosis, clubbing, or edema.                               BREASTS:  She is status post left nipple sparing mastectomy with a prosthesis in place.  Her incision has healed nicely and there are no palpable masses    There are no masses in the right breast.                                        LYMPHATIC:  There is no cervical, axillary, or supraclavicular adenopathy.   SKIN:  Warm and moist, without petechiae, rashes, induration, or ecchymoses.           NEUROLOGIC:  DTRs are 0-1+ bilaterally, symmetrical, motor function is 5/5,  and cranial nerves are  within normal limits.     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 Rouse and her .  She is doing well and remains in remission.  I again explained to her that her chance of a cure is more than 95%.  We went over the guidelines, and I again shared with her the [facility] March 1, 2013 paper delineating the standard of care for follow-up.  I will see her again in 4 months and  we will repeat her mammogram in November 2024    Her multiple questions and her 's questions were answered to her satisfaction.  I spent approximately 30 minutes reviewing the available records and evaluating the patient, out of which over 50% of the time was spent face to face with the patient in counseling and coordinating this patient's care.

## 2024-11-18 ENCOUNTER — TELEPHONE (OUTPATIENT)
Dept: HEMATOLOGY/ONCOLOGY | Facility: CLINIC | Age: 74
End: 2024-11-18
Payer: MEDICARE

## 2024-11-18 NOTE — TELEPHONE ENCOUNTER
Pt requesting r/s and will not be able to come in for appt with Dr. Hernandez today d/t having viral GI symptoms. Pt accepted new appt with EB Gotti on 11/26 at 10am.

## 2024-11-18 NOTE — TELEPHONE ENCOUNTER
----- Message from [place] sent at 11/18/2024  8:27 AM CST -----  Type: Needs Medical Advice  Who Called:  Patient   Symptoms (please be specific):    How long has patient had these symptoms:    Pharmacy name and phone #:    Best Call Back Number: [phone]  Additional Information: Patient is requesting a call back to reschedule her appt. on 11/18 at 10:00 due to stomach bug.

## 2024-11-25 NOTE — PROGRESS NOTES
Subjective:      Name: Margaret Rouse  : 1950  MRN: [id number]    CC: 14 month post treatment breast surveillance    HPI:   Margaret Rouse is a 74 y.o. female presents to the clinic with her  for her 14 month post breast surveillance.  She has been well and remains active.  Some neuropathy in her feet.  No fevers, chills, unexplained weight loss, new lumps or bumps in chest/right breast/axilla. No abdominal pain, N/V/D, drenching night sweats, rashes, etc.       Oncology History:   23 Seen for a newly diagnosed stage IA IDC of the left breast. MRI Breasts: clumped non-mass enhancement with associated clip 3.1 x 1.2 x 2.9cm with sub-cm satellites inferiorly up to 6mm at 1.6cm inferior.  No abnormal SARAH. Biopsy positive for IDC/DCIS, ER; low positive, WI: negative, no HER-2 since was insufficient invasive tumor for IDC and was run on the DCIS part.     23:  Mastectomy with nipple sparing, left SLNB, left breast reconstruction with implant (Buonagura, Adali)    Paclitaxel and Trastuzumab on 23 that was completed 23.  2 doses of maintenance Herceptin received before stopping on 23.     Oncology History   Invasive ductal carcinoma of left breast in female   2023 Cancer Staged    Staging form: Breast, AJCC 8th Edition  - Clinical stage from 2023: cT1mi, cN0, cM0, G3, ER+, WI-, HER2: Not Assessed     2023 Initial Diagnosis    Invasive ductal carcinoma of left breast in female     2023 Biopsy    LEFT BREAST: INVASIVE DUCTAL CARCINOMA   GRADE: INTERMEDIATE   ER: NEGATIVE, WI: NEGATIVE, HER2: POSITIVE      2023 Breast Tumor Markers    DCIS  Estrogen: Positive  Progesterone: Negative  HER2: Not assessed     IDC  Estrogen: Negative  Progesterone: Negative  HER2: Positive  Ki67: 28     2023 Cancer Staged    Staging form: Breast, AJCC 8th Edition  - Pathologic stage from 2023: Stage IA (pT1a, pN0(sn), cM0, G2, ER-, WI-, HER2+)     2023 -  9/5/2023 Chemotherapy    Treatment Summary   Plan Name: OP BREAST TRASTUZUMAB PACLITAXEL WEEKLY  Treatment Goal: Curative  Status: Inactive  Start Date: 5/22/2023  End Date: 9/5/2023  Provider: Alexandra Hannah MD  Chemotherapy: PACLitaxeL (TAXOL) 80 mg/m2 = 144 mg in sodium chloride 0.9% 250 mL chemo infusion, 80 mg/m2 = 144 mg, Intravenous, Clinic/HOD 1 time, 12 of 12 cycles  Administration: 144 mg (5/22/2023), 144 mg (5/29/2023), 144 mg (6/5/2023), 144 mg (6/12/2023), 144 mg (6/19/2023), 144 mg (6/26/2023), 144 mg (7/3/2023), 144 mg (7/10/2023), 144 mg (7/17/2023), 144 mg (7/24/2023), 144 mg (7/31/2023), 144 mg (8/7/2023)  trastuzumab-dkst (OGIVRI) 278 mg in sodium chloride 0.9% 298.2 mL chemo infusion, 4 mg/kg = 278 mg, Intravenous, Clinic/HOD 1 time, 14 of 14 cycles  Administration: 278 mg (5/22/2023), 139 mg (5/29/2023), 417 mg (8/14/2023), 136 mg (6/5/2023), 136 mg (6/12/2023), 136 mg (6/19/2023), 136 mg (6/26/2023), 136 mg (7/3/2023), 136 mg (7/10/2023), 136 mg (7/17/2023), 136 mg (7/24/2023), 139 mg (7/31/2023), 139 mg (8/7/2023), 417 mg (9/5/2023)     9/12/2023 Tumor Conference    1.5 mm of Her2 + microinvasive ductal carcinoma on surgical pathology. Team discussed adjuvant treatment options. Patient has completed Taxol post operatively and is now being treated with Herceptin. Team does not feel she needs to proceed with this treatment course upon full review of her case.         Ductal carcinoma in situ (DCIS) of left breast   2/13/2023 Initial Diagnosis    Ductal carcinoma in situ (DCIS) of left breast     4/20/2023 Breast Tumor Markers    DCIS  Estrogen: Positive  Progesterone: Negative  HER2: Not assessed     IDC  Estrogen: Negative  Progesterone: Negative  HER2: Positive  Ki67: 28     5/22/2023 - 9/5/2023 Chemotherapy    Treatment Summary   Plan Name: OP BREAST TRASTUZUMAB PACLITAXEL WEEKLY  Treatment Goal: Curative  Status: Inactive  Start Date: 5/22/2023  End Date: 9/5/2023  Provider: Alexandra Hannah,  MD  Chemotherapy: PACLitaxeL (TAXOL) 80 mg/m2 = 144 mg in sodium chloride 0.9% 250 mL chemo infusion, 80 mg/m2 = 144 mg, Intravenous, Clinic/HOD 1 time, 12 of 12 cycles  Administration: 144 mg (5/22/2023), 144 mg (5/29/2023), 144 mg (6/5/2023), 144 mg (6/12/2023), 144 mg (6/19/2023), 144 mg (6/26/2023), 144 mg (7/3/2023), 144 mg (7/10/2023), 144 mg (7/17/2023), 144 mg (7/24/2023), 144 mg (7/31/2023), 144 mg (8/7/2023)  trastuzumab-dkst (OGIVRI) 278 mg in sodium chloride 0.9% 298.2 mL chemo infusion, 4 mg/kg = 278 mg, Intravenous, Clinic/HOD 1 time, 14 of 14 cycles  Administration: 278 mg (5/22/2023), 139 mg (5/29/2023), 417 mg (8/14/2023), 136 mg (6/5/2023), 136 mg (6/12/2023), 136 mg (6/19/2023), 136 mg (6/26/2023), 136 mg (7/3/2023), 136 mg (7/10/2023), 136 mg (7/17/2023), 136 mg (7/24/2023), 139 mg (7/31/2023), 139 mg (8/7/2023), 417 mg (9/5/2023)     9/12/2023 Tumor Conference    1.5 mm of Her2 + microinvasive ductal carcinoma on surgical pathology. Team discussed adjuvant treatment options. Patient has completed Taxol post operatively and is now being treated with Herceptin. Team does not feel she needs to proceed with this treatment course upon full review of her case.                Past Medical History:   Diagnosis Date    Abnormal results of liver function studies     Cancer     left breast cancer    Chest pain     Fatigue     H/O: pneumonia     History of chicken pox     Hyperlipidemia, mixed     Hypertension     Menopausal syndrome     Palpitations 12/15/2015    PONV (postoperative nausea and vomiting)     Thyroid disease        Past Surgical History:   Procedure Laterality Date    BREAST RECONSTRUCTION Left 04/20/2023    Procedure: RECONSTRUCTION, BREAST;  Surgeon: Pipo Bro MD;  Location: [facility] OR;  Service: Plastics;  Laterality: Left;    CATARACT EXTRACTION W/ INTRAOCULAR LENS  IMPLANT, BILATERAL      COLONOSCOPY      COSMETIC SURGERY      eyelids    DEBRIDEMENT AND CLOSURE OF WOUND OF FINGER Left  11/29/2018    Procedure: DEBRIDEMENT, WOUND, FINGER, WITH CLOSURE, VY FLap;  Surgeon: Frederic Barbour MD;  Location: [facility] OR;  Service: Plastics;  Laterality: Left;    HYSTEROSCOPY WITH DILATION AND CURETTAGE OF UTERUS N/A 9/14/2023    Procedure: HYSTEROSCOPY, WITH DILATION AND CURETTAGE OF UTERUS/ Myosure;  Surgeon: Beatrice Hernandez MD;  Location: [facility] CSC;  Service: Gynecology Oncology;  Laterality: N/A;    INSERTION OF BREAST IMPLANT Left 04/20/2023    Procedure: INSERTION, BREAST IMPLANT;  Surgeon: Pipo Bro MD;  Location: [facility] OR;  Service: Plastics;  Laterality: Left;    KNEE SURGERY Right 2015    Knee Arthroscopy    LEFT HEART CATHETERIZATION Left 11/18/2021    Procedure: CATHETERIZATION, HEART, LEFT;  Surgeon: Basim Castrejon MD;  Location: [facility] CATH;  Service: Cardiology;  Laterality: Left;    PLACEMENT OF ACELLULAR HUMAN DERMAL ALLOGRAFT Left 04/20/2023    Procedure: APPLICATION, ACELLULAR HUMAN DERMAL ALLOGRAFT;  Surgeon: Pipo Bro MD;  Location: [facility] OR;  Service: Plastics;  Laterality: Left;    SENTINEL LYMPH NODE BIOPSY Left 04/20/2023    Procedure: BIOPSY, LYMPH NODE, SENTINEL;  Surgeon: Amanda Mcclellan MD;  Location: [facility] OR;  Service: General;  Laterality: Left;    SIMPLE MASTECTOMY Left 04/20/2023    Procedure: MASTECTOMY, SIMPLE;  Surgeon: Amanda Mcclellan MD;  Location: [facility] OR;  Service: General;  Laterality: Left;    TONSILLECTOMY  1955       Family History   Problem Relation Name Age of Onset    No Known Problems Mother      Heart disease Father      Heart attack Father          at age 64    Cancer Sister          folicular non-hodgkins lymphoma    Lymphoma Sister      Diabetes Maternal Grandmother         Social History     Socioeconomic History    Marital status:    Tobacco Use    Smoking status: Former    Smokeless tobacco: Never    Tobacco comments:     50 years ago   Substance and Sexual Activity    Alcohol use: No     Comment: rarely    Drug use: No    Sexual  activity: Yes     Partners: Male     Social Drivers of Health     Financial Resource Strain: Low Risk  (2/26/2024)    Overall Financial Resource Strain (CARDIA)     Difficulty of Paying Living Expenses: Not hard at all   Food Insecurity: No Food Insecurity (2/26/2024)    Hunger Vital Sign     Worried About Running Out of Food in the Last Year: Never true     Ran Out of Food in the Last Year: Never true   Transportation Needs: No Transportation Needs (2/26/2024)    PRAPARE - Transportation     Lack of Transportation (Medical): No     Lack of Transportation (Non-Medical): No   Physical Activity: Insufficiently Active (2/26/2024)    Exercise Vital Sign     Days of Exercise per Week: 2 days     Minutes of Exercise per Session: 10 min   Stress: Stress Concern Present (2/26/2024)    [employer] - Occupational Stress Questionnaire     Feeling of Stress : To some extent   Housing Stability: Low Risk  (2/26/2024)    Housing Stability Vital Sign     Unable to Pay for Housing in the Last Year: No     Number of Places Lived in the Last Year: 1     Unstable Housing in the Last Year: No       Review of patient's allergies indicates:  No Known Allergies    Review of Systems   Eyes:  Negative for visual disturbance.   Cardiovascular:  Negative for chest pain.   Respiratory:  Negative for cough and shortness of breath.    Hematologic/Lymphatic: Negative for adenopathy.   Skin:  Negative for rash.   Musculoskeletal:  Positive for back pain.   Gastrointestinal:  Negative for abdominal pain and diarrhea.   Genitourinary:  Negative for frequency.   Neurological:  Negative for headaches.   Psychiatric/Behavioral:  The patient is not nervous/anxious.    All other systems reviewed and are negative.           Objective:     Vitals:    11/26/24 1002   BP: 132/73   BP Location: Left arm   Patient Position: Sitting   Pulse: 71   Resp: 18   Temp: 97.8 °F (36.6 °C)   TempSrc: Temporal   SpO2: 97%   Weight: 70.1 kg  "(154 lb 8.7 oz)   Height: 5' 6" (1.676 m)        Physical Exam  Vitals reviewed.   Constitutional:       General: She is not in acute distress.  HENT:      Head: Normocephalic and atraumatic.      Mouth/Throat:      Pharynx: Oropharynx is clear.   Eyes:      Conjunctiva/sclera: Conjunctivae normal.   Cardiovascular:      Rate and Rhythm: Normal rate and regular rhythm.      Pulses: Normal pulses.      Heart sounds: Normal heart sounds.   Pulmonary:      Effort: Pulmonary effort is normal. No respiratory distress.      Breath sounds: Normal breath sounds. No wheezing.   Chest:   Breasts:     Right: Normal. No swelling, bleeding, inverted nipple, mass, nipple discharge, skin change or tenderness.      Comments: Left breast with implant/nipple sparing; no lumps/bumps about the chest wall or axilla  Abdominal:      General: Bowel sounds are normal. There is no distension.      Palpations: Abdomen is soft.      Tenderness: There is no abdominal tenderness.   Musculoskeletal:      Cervical back: Neck supple.      Right lower leg: No edema.      Left lower leg: No edema.   Lymphadenopathy:      Cervical: No cervical adenopathy.      Upper Body:      Right upper body: No supraclavicular or axillary adenopathy.      Left upper body: No supraclavicular or axillary adenopathy.      Lower Body: No right inguinal adenopathy. No left inguinal adenopathy.   Skin:     General: Skin is warm and dry.      Findings: No rash.   Neurological:      Mental Status: She is alert and oriented to person, place, and time.   Psychiatric:         Behavior: Behavior normal.         Thought Content: Thought content normal.             Current Outpatient Medications on File Prior to Visit   Medication Sig    cyanocobalamin, vitamin B-12, 3,000 mcg Cap Take by mouth once daily. Triple Blend with folate 680mcg    diazePAM (VALIUM) 5 MG tablet Take 1 tablet (5 mg total) by mouth once. for 1 dose (Patient not taking: Reported on 3/4/2024)    " fluticasone propionate (FLONASE) 50 mcg/actuation nasal spray 1 spray by Each Nostril route as needed.    KRILL OIL ORAL Take 2,000 mg by mouth once daily.    levothyroxine (SYNTHROID) 112 MCG tablet TAKE 1 TABLET BY MOUTH EVERY DAY 3-4 HRS AFTER SUPPER    losartan (COZAAR) 25 MG tablet once daily.    rosuvastatin (CRESTOR) 20 MG tablet Take 20 mg by mouth once daily.    traZODone (DESYREL) 50 MG tablet Take 1 tablet (50 mg total) by mouth nightly.    UNABLE TO FIND Take 1,000 mg by mouth once daily. medication name: dried liver    VITAMIN A ORAL Take 5,000 Int'l Units/L by mouth once daily.    vitamin K2 45 mcg Cap Take by mouth once daily.    zinc gluconate 50 mg tablet Take 25 mg by mouth once daily.     Current Facility-Administered Medications on File Prior to Visit   Medication    0.9%  NaCl infusion         11/07/24  Mammo Digital Screening Right with Seferino  Narrative: Facility:  [address] [address]  [phone]    Name: Margaret Rouse    MRN: [id number]    Result:   Mammo Digital Screening Right with Seferino     History:  Patient is 74 y.o. and is seen for screening. The patient is status post   left mastectomy.    Films Compared:  Compared to: 11/07/2023 Mammo Digital Diagnostic Right with Seferino,   04/20/2023 Mammo Breast Specimen, 02/09/2023 Mammo Digital Diagnostic   Bilat with Seferino, and 02/07/2023 Mammo Digital Screening Bilat w/ Seferino     Findings:  This procedure was performed using tomosynthesis. Computer-aided detection   was utilized in the interpretation of this examination.    There are scattered areas of fibroglandular density.     There are stable benign calcifications.    There is no evidence of suspicious masses, calcifications, or other   abnormal findings in the right breast.  Impression: There is no mammographic evidence of malignancy in the right breast.    BI-RADS Category:   Overall: 2 - Benign        Recommendation:  Routine screening mammogram in 1 year is recommended.    Eliana Nguyễn MD    Patient information entered into a reminder system with a target due date   for the above recommendation.       All pertinent labs and imaging reviewed.    Assessment:       1. Invasive ductal carcinoma of left breast in female         Plan:     Invasive ductal carcinoma of left breast in female       invasive ductal carcinoma of the left breast  -no indication for neoadjuvant chemo, no indication for further imaging, no indication for oncotype especially with possible T1a or T1b IDC and age >70's   -ER: 5.4%+, GA: negative, HER-2 non assess initially then HER-2  +after IDC  was found  -significant discussion about adjuvant chemo vs endocrine but given her low ER/GA positivity, HER-2 therapy might be her only way for preventing this cancer from coming back  -Last DEXA scan  in 2016 showed osteopenia   -decision to move forward with TH; began 5/22/23  -Echo 5/11/23 with EF 65%; next scheduled for 8/14/23  Completed 12 weeks of TH (08/07/23), proceed with 2 additional Herceptin maintenance doses before stopping on 09/05/23.  11/26/24:  DIANA @ 14 months post treatment; f/u in 4 months with CBC, CMP prior.     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  30 minutes of total time spent on the encounter, which includes face to face time and non-face to face time preparing to see the patient (eg, review of tests), Obtaining and/or reviewing separately obtained history, Documenting clinical information in the electronic or other health record, Independently interpreting results if documented above (not separately reported) and communicating results to the patient/family/caregiver, or Care coordination (not separately reported).     Route Chart for Scheduling    Med Onc Chart Routing      Follow up with physician    Follow up with SHADI 4  months. f/u in 4 months with me with CBC, CMP   Infusion scheduling note    Injection scheduling note    Labs    Imaging    Pharmacy appointment    Other referrals                            Answers submitted by the patient for this visit:  Review of Systems Questionnaire (Submitted on 11/25/2024)  appetite change : No  unexpected weight change: No  mouth sores: No

## 2024-11-26 ENCOUNTER — OFFICE VISIT (OUTPATIENT)
Dept: HEMATOLOGY/ONCOLOGY | Facility: CLINIC | Age: 74
End: 2024-11-26
Payer: MEDICARE

## 2024-11-26 VITALS
SYSTOLIC BLOOD PRESSURE: 132 MMHG | OXYGEN SATURATION: 97 % | HEART RATE: 71 BPM | RESPIRATION RATE: 18 BRPM | DIASTOLIC BLOOD PRESSURE: 73 MMHG | WEIGHT: 154.56 LBS | HEIGHT: 66 IN | TEMPERATURE: 98 F | BODY MASS INDEX: 24.84 KG/M2

## 2024-11-26 DIAGNOSIS — C50.912 INVASIVE DUCTAL CARCINOMA OF LEFT BREAST IN FEMALE: Primary | ICD-10-CM

## 2024-11-26 PROCEDURE — 99213 OFFICE O/P EST LOW 20 MIN: CPT | Mod: PBBFAC,PN | Performed by: NURSE PRACTITIONER

## 2024-11-26 PROCEDURE — 99999 PR PBB SHADOW E&M-EST. PATIENT-LVL III: CPT | Mod: PBBFAC,,, | Performed by: NURSE PRACTITIONER

## 2024-11-26 RX ORDER — CYANOCOBALAMIN (VITAMIN B-12) 2500 MCG
TABLET, SUBLINGUAL SUBLINGUAL
COMMUNITY
Start: 2024-05-01

## 2024-11-26 RX ORDER — VIT C/E/ZN/COPPR/LUTEIN/ZEAXAN 250MG-90MG
CAPSULE ORAL
COMMUNITY
Start: 2024-05-01

## 2024-11-26 RX ORDER — CALCIUM CARBONATE 260MG(650)
TABLET,CHEWABLE ORAL
COMMUNITY
Start: 2024-05-01

## 2025-03-21 ENCOUNTER — TELEPHONE (OUTPATIENT)
Dept: HEMATOLOGY/ONCOLOGY | Facility: CLINIC | Age: 75
End: 2025-03-21
Payer: MEDICARE

## 2025-03-21 ENCOUNTER — PATIENT MESSAGE (OUTPATIENT)
Dept: HEMATOLOGY/ONCOLOGY | Facility: CLINIC | Age: 75
End: 2025-03-21
Payer: MEDICARE

## 2025-03-21 NOTE — TELEPHONE ENCOUNTER
Pt responded to my message by girma about rescheduling her   Labs and provider appt. Pt verbalized understanding and agree to the schedule change Labs on 04/28 and provider appt 04/29/25

## 2025-03-21 NOTE — TELEPHONE ENCOUNTER
Call pt to reschedule her appointment that she had with Shen on 03/25/25  No answer left pt a voicemail for her to return our call @[phone]

## 2025-04-28 ENCOUNTER — LAB VISIT (OUTPATIENT)
Dept: LAB | Facility: HOSPITAL | Age: 75
End: 2025-04-28
Attending: NURSE PRACTITIONER
Payer: MEDICARE

## 2025-04-28 DIAGNOSIS — C50.912 INVASIVE DUCTAL CARCINOMA OF LEFT BREAST IN FEMALE: ICD-10-CM

## 2025-04-28 LAB
ABSOLUTE EOSINOPHIL (OHS): 0.26 K/UL
ABSOLUTE MONOCYTE (OHS): 0.5 K/UL (ref 0.3–1)
ABSOLUTE NEUTROPHIL COUNT (OHS): 3.27 K/UL (ref 1.8–7.7)
ALBUMIN SERPL BCP-MCNC: 4.3 G/DL (ref 3.5–5.2)
ALP SERPL-CCNC: 54 UNIT/L (ref 40–150)
ALT SERPL W/O P-5'-P-CCNC: 22 UNIT/L (ref 10–44)
ANION GAP (OHS): 8 MMOL/L (ref 8–16)
AST SERPL-CCNC: 22 UNIT/L (ref 11–45)
BASOPHILS # BLD AUTO: 0.03 K/UL
BASOPHILS NFR BLD AUTO: 0.5 %
BILIRUB SERPL-MCNC: 0.5 MG/DL (ref 0.1–1)
BUN SERPL-MCNC: 22 MG/DL (ref 8–23)
CALCIUM SERPL-MCNC: 9.9 MG/DL (ref 8.7–10.5)
CHLORIDE SERPL-SCNC: 110 MMOL/L (ref 95–110)
CO2 SERPL-SCNC: 23 MMOL/L (ref 23–29)
CREAT SERPL-MCNC: 0.9 MG/DL (ref 0.5–1.4)
ERYTHROCYTE [DISTWIDTH] IN BLOOD BY AUTOMATED COUNT: 12 % (ref 11.5–14.5)
GFR SERPLBLD CREATININE-BSD FMLA CKD-EPI: >60 ML/MIN/1.73/M2
GLUCOSE SERPL-MCNC: 112 MG/DL (ref 70–110)
HCT VFR BLD AUTO: 40.9 % (ref 37–48.5)
HGB BLD-MCNC: 13.7 GM/DL (ref 12–16)
IMM GRANULOCYTES # BLD AUTO: 0.02 K/UL (ref 0–0.04)
IMM GRANULOCYTES NFR BLD AUTO: 0.3 % (ref 0–0.5)
LYMPHOCYTES # BLD AUTO: 2.26 K/UL (ref 1–4.8)
MCH RBC QN AUTO: 30.6 PG (ref 27–31)
MCHC RBC AUTO-ENTMCNC: 33.5 G/DL (ref 32–36)
MCV RBC AUTO: 91 FL (ref 82–98)
NUCLEATED RBC (/100WBC) (OHS): 0 /100 WBC
PLATELET # BLD AUTO: 185 K/UL (ref 150–450)
PMV BLD AUTO: 9.6 FL (ref 9.2–12.9)
POTASSIUM SERPL-SCNC: 4.1 MMOL/L (ref 3.5–5.1)
PROT SERPL-MCNC: 7.6 GM/DL (ref 6–8.4)
RBC # BLD AUTO: 4.48 M/UL (ref 4–5.4)
RELATIVE EOSINOPHIL (OHS): 4.1 %
RELATIVE LYMPHOCYTE (OHS): 35.6 % (ref 18–48)
RELATIVE MONOCYTE (OHS): 7.9 % (ref 4–15)
RELATIVE NEUTROPHIL (OHS): 51.6 % (ref 38–73)
SODIUM SERPL-SCNC: 141 MMOL/L (ref 136–145)
WBC # BLD AUTO: 6.34 K/UL (ref 3.9–12.7)

## 2025-04-28 PROCEDURE — 82040 ASSAY OF SERUM ALBUMIN: CPT | Mod: PN

## 2025-04-28 PROCEDURE — 36415 COLL VENOUS BLD VENIPUNCTURE: CPT | Mod: PN

## 2025-04-28 PROCEDURE — 85025 COMPLETE CBC W/AUTO DIFF WBC: CPT | Mod: PN

## 2025-04-29 ENCOUNTER — OFFICE VISIT (OUTPATIENT)
Dept: HEMATOLOGY/ONCOLOGY | Facility: CLINIC | Age: 75
End: 2025-04-29
Payer: MEDICARE

## 2025-04-29 VITALS
HEART RATE: 86 BPM | DIASTOLIC BLOOD PRESSURE: 84 MMHG | BODY MASS INDEX: 24.52 KG/M2 | HEIGHT: 66 IN | TEMPERATURE: 98 F | SYSTOLIC BLOOD PRESSURE: 145 MMHG | RESPIRATION RATE: 18 BRPM | OXYGEN SATURATION: 96 % | WEIGHT: 152.56 LBS

## 2025-04-29 DIAGNOSIS — C50.912 INVASIVE DUCTAL CARCINOMA OF LEFT BREAST IN FEMALE: Primary | ICD-10-CM

## 2025-04-29 PROCEDURE — 99214 OFFICE O/P EST MOD 30 MIN: CPT | Mod: PBBFAC,PN | Performed by: NURSE PRACTITIONER

## 2025-04-29 PROCEDURE — 99999 PR PBB SHADOW E&M-EST. PATIENT-LVL IV: CPT | Mod: PBBFAC,,, | Performed by: NURSE PRACTITIONER

## 2025-04-29 RX ORDER — DIAZEPAM 5 MG/1
5 TABLET ORAL EVERY 6 HOURS PRN
COMMUNITY

## 2025-04-29 RX ORDER — FLUOCINOLONE ACETONIDE 0.11 MG/ML
OIL AURICULAR (OTIC)
COMMUNITY
Start: 2025-03-27

## 2025-04-29 NOTE — PROGRESS NOTES
Subjective:      Name: Margaret Rouse  : 1950  MRN: [id number]    CC: 19 month post treatment breast surveillance    HPI:   Margaret Rouse is a 74 y.o. female presents to the clinic with her  for her 19 month post breast surveillance.  She has been well and remains active.  Some neuropathy in her feet.  Additional stress in home life, otherwise no new lumps or bumps; fevers, chills, unexplained weight loss. No abdominal pain, N/V/D, drenching night sweats, rashes, etc.  Labs reviewed       Oncology History:   23 Seen for a newly diagnosed stage IA IDC of the left breast. MRI Breasts: clumped non-mass enhancement with associated clip 3.1 x 1.2 x 2.9cm with sub-cm satellites inferiorly up to 6mm at 1.6cm inferior.  No abnormal SARAH. Biopsy positive for IDC/DCIS, ER; low positive, CO: negative, no HER-2 since was insufficient invasive tumor for IDC and was run on the DCIS part.     23:  Mastectomy with nipple sparing, left SLNB, left breast reconstruction with implant (Buonagura, Adali)    Paclitaxel and Trastuzumab on 23 that was completed 23.  2 doses of maintenance Herceptin received before stopping on 23.     Oncology History   Invasive ductal carcinoma of left breast in female   2023 Cancer Staged    Staging form: Breast, AJCC 8th Edition  - Clinical stage from 2023: cT1mi, cN0, cM0, G3, ER+, CO-, HER2: Not Assessed     2023 Initial Diagnosis    Invasive ductal carcinoma of left breast in female     2023 Biopsy    LEFT BREAST: INVASIVE DUCTAL CARCINOMA   GRADE: INTERMEDIATE   ER: NEGATIVE, CO: NEGATIVE, HER2: POSITIVE      2023 Breast Tumor Markers    DCIS  Estrogen: Positive  Progesterone: Negative  HER2: Not assessed     IDC  Estrogen: Negative  Progesterone: Negative  HER2: Positive  Ki67: 28     2023 Cancer Staged    Staging form: Breast, AJCC 8th Edition  - Pathologic stage from 2023: Stage IA (pT1a, pN0(sn), cM0, G2, ER-,  LA-, HER2+)     5/22/2023 - 9/5/2023 Chemotherapy    Treatment Summary   Plan Name: OP BREAST TRASTUZUMAB PACLITAXEL WEEKLY  Treatment Goal: Curative  Status: Inactive  Start Date: 5/22/2023  End Date: 9/5/2023  Provider: Alexandra Hannah MD  Chemotherapy: PACLitaxeL (TAXOL) 80 mg/m2 = 144 mg in sodium chloride 0.9% 250 mL chemo infusion, 80 mg/m2 = 144 mg, Intravenous, Clinic/HOD 1 time, 12 of 12 cycles  Administration: 144 mg (5/22/2023), 144 mg (5/29/2023), 144 mg (6/5/2023), 144 mg (6/12/2023), 144 mg (6/19/2023), 144 mg (6/26/2023), 144 mg (7/3/2023), 144 mg (7/10/2023), 144 mg (7/17/2023), 144 mg (7/24/2023), 144 mg (7/31/2023), 144 mg (8/7/2023)  trastuzumab-dkst (OGIVRI) 278 mg in sodium chloride 0.9% 298.2 mL chemo infusion, 4 mg/kg = 278 mg, Intravenous, Clinic/HOD 1 time, 14 of 14 cycles  Administration: 278 mg (5/22/2023), 139 mg (5/29/2023), 417 mg (8/14/2023), 136 mg (6/5/2023), 136 mg (6/12/2023), 136 mg (6/19/2023), 136 mg (6/26/2023), 136 mg (7/3/2023), 136 mg (7/10/2023), 136 mg (7/17/2023), 136 mg (7/24/2023), 139 mg (7/31/2023), 139 mg (8/7/2023), 417 mg (9/5/2023)     9/12/2023 Tumor Conference    1.5 mm of Her2 + microinvasive ductal carcinoma on surgical pathology. Team discussed adjuvant treatment options. Patient has completed Taxol post operatively and is now being treated with Herceptin. Team does not feel she needs to proceed with this treatment course upon full review of her case.         Ductal carcinoma in situ (DCIS) of left breast   2/13/2023 Initial Diagnosis    Ductal carcinoma in situ (DCIS) of left breast     4/20/2023 Breast Tumor Markers    DCIS  Estrogen: Positive  Progesterone: Negative  HER2: Not assessed     IDC  Estrogen: Negative  Progesterone: Negative  HER2: Positive  Ki67: 28     5/22/2023 - 9/5/2023 Chemotherapy    Treatment Summary   Plan Name: OP BREAST TRASTUZUMAB PACLITAXEL WEEKLY  Treatment Goal: Curative  Status: Inactive  Start Date: 5/22/2023  End Date:  9/5/2023  Provider: Alexandra Hannah MD  Chemotherapy: PACLitaxeL (TAXOL) 80 mg/m2 = 144 mg in sodium chloride 0.9% 250 mL chemo infusion, 80 mg/m2 = 144 mg, Intravenous, Clinic/HOD 1 time, 12 of 12 cycles  Administration: 144 mg (5/22/2023), 144 mg (5/29/2023), 144 mg (6/5/2023), 144 mg (6/12/2023), 144 mg (6/19/2023), 144 mg (6/26/2023), 144 mg (7/3/2023), 144 mg (7/10/2023), 144 mg (7/17/2023), 144 mg (7/24/2023), 144 mg (7/31/2023), 144 mg (8/7/2023)  trastuzumab-dkst (OGIVRI) 278 mg in sodium chloride 0.9% 298.2 mL chemo infusion, 4 mg/kg = 278 mg, Intravenous, Clinic/HOD 1 time, 14 of 14 cycles  Administration: 278 mg (5/22/2023), 139 mg (5/29/2023), 417 mg (8/14/2023), 136 mg (6/5/2023), 136 mg (6/12/2023), 136 mg (6/19/2023), 136 mg (6/26/2023), 136 mg (7/3/2023), 136 mg (7/10/2023), 136 mg (7/17/2023), 136 mg (7/24/2023), 139 mg (7/31/2023), 139 mg (8/7/2023), 417 mg (9/5/2023)     9/12/2023 Tumor Conference    1.5 mm of Her2 + microinvasive ductal carcinoma on surgical pathology. Team discussed adjuvant treatment options. Patient has completed Taxol post operatively and is now being treated with Herceptin. Team does not feel she needs to proceed with this treatment course upon full review of her case.                Past Medical History:   Diagnosis Date    Abnormal results of liver function studies     Cancer     left breast cancer    Chest pain     Fatigue     H/O: pneumonia     History of chicken pox     Hyperlipidemia, mixed     Hypertension     Menopausal syndrome     Palpitations 12/15/2015    PONV (postoperative nausea and vomiting)     Thyroid disease        Past Surgical History:   Procedure Laterality Date    BREAST RECONSTRUCTION Left 04/20/2023    Procedure: RECONSTRUCTION, BREAST;  Surgeon: Pipo Bro MD;  Location: [facility];  Service: Plastics;  Laterality: Left;    CATARACT EXTRACTION W/ INTRAOCULAR LENS  IMPLANT, BILATERAL      COLONOSCOPY      COSMETIC SURGERY      eyelids    DEBRIDEMENT AND  CLOSURE OF WOUND OF FINGER Left 11/29/2018    Procedure: DEBRIDEMENT, WOUND, FINGER, WITH CLOSURE, VY FLap;  Surgeon: Frederic Barbour MD;  Location: [facility] OR;  Service: Plastics;  Laterality: Left;    HYSTEROSCOPY WITH DILATION AND CURETTAGE OF UTERUS N/A 9/14/2023    Procedure: HYSTEROSCOPY, WITH DILATION AND CURETTAGE OF UTERUS/ Myosure;  Surgeon: Beatrice Hernandez MD;  Location: [facility];  Service: Gynecology Oncology;  Laterality: N/A;    INSERTION OF BREAST IMPLANT Left 04/20/2023    Procedure: INSERTION, BREAST IMPLANT;  Surgeon: Pipo Bro MD;  Location: [facility] OR;  Service: Plastics;  Laterality: Left;    KNEE SURGERY Right 2015    Knee Arthroscopy    LEFT HEART CATHETERIZATION Left 11/18/2021    Procedure: CATHETERIZATION, HEART, LEFT;  Surgeon: Basim Castrejon MD;  Location: [facility] CATH;  Service: Cardiology;  Laterality: Left;    PLACEMENT OF ACELLULAR HUMAN DERMAL ALLOGRAFT Left 04/20/2023    Procedure: APPLICATION, ACELLULAR HUMAN DERMAL ALLOGRAFT;  Surgeon: Pipo Bro MD;  Location: [facility] OR;  Service: Plastics;  Laterality: Left;    SENTINEL LYMPH NODE BIOPSY Left 04/20/2023    Procedure: BIOPSY, LYMPH NODE, SENTINEL;  Surgeon: Amanda Mcclellan MD;  Location: [facility] OR;  Service: General;  Laterality: Left;    SIMPLE MASTECTOMY Left 04/20/2023    Procedure: MASTECTOMY, SIMPLE;  Surgeon: Amanda Mcclellan MD;  Location: [facility];  Service: General;  Laterality: Left;    TONSILLECTOMY  1955       Family History   Problem Relation Name Age of Onset    No Known Problems Mother      Heart disease Father      Heart attack Father          at age 64    Cancer Sister          folicular non-hodgkins lymphoma    Lymphoma Sister      Diabetes Maternal Grandmother         Social History     Socioeconomic History    Marital status:    Tobacco Use    Smoking status: Former    Smokeless tobacco: Never    Tobacco comments:     50 years ago   Substance and Sexual Activity    Alcohol use: No     Comment:  rarely    Drug use: No    Sexual activity: Yes     Partners: Male     Social Drivers of Health     Financial Resource Strain: Low Risk  (2/26/2024)    Overall Financial Resource Strain (CARDIA)     Difficulty of Paying Living Expenses: Not hard at all   Food Insecurity: No Food Insecurity (2/26/2024)    Hunger Vital Sign     Worried About Running Out of Food in the Last Year: Never true     Ran Out of Food in the Last Year: Never true   Transportation Needs: No Transportation Needs (2/26/2024)    PRAPARE - Transportation     Lack of Transportation (Medical): No     Lack of Transportation (Non-Medical): No   Physical Activity: Insufficiently Active (2/26/2024)    Exercise Vital Sign     Days of Exercise per Week: 2 days     Minutes of Exercise per Session: 10 min   Stress: Stress Concern Present (2/26/2024)    [place] [employer] - Occupational Stress Questionnaire     Feeling of Stress : To some extent   Housing Stability: Low Risk  (2/26/2024)    Housing Stability Vital Sign     Unable to Pay for Housing in the Last Year: No     Number of Places Lived in the Last Year: 1     Unstable Housing in the Last Year: No       Review of patient's allergies indicates:  No Known Allergies    Review of Systems   Eyes:  Negative for visual disturbance.   Cardiovascular:  Negative for chest pain.   Respiratory:  Negative for cough and shortness of breath.    Hematologic/Lymphatic: Negative for adenopathy.   Skin:  Negative for rash.   Musculoskeletal:  Positive for back pain.   Gastrointestinal:  Negative for abdominal pain and diarrhea.   Genitourinary:  Negative for frequency.   Neurological:  Negative for headaches.   Psychiatric/Behavioral:  The patient is not nervous/anxious.    All other systems reviewed and are negative.           Objective:     Vitals:    04/29/25 1501   BP: (!) 145/84   BP Location: Left arm   Patient Position: Sitting   Pulse: 86   Resp: 18   Temp: 97.5 °F (36.4 °C)   TempSrc:  "Temporal   SpO2: 96%   Weight: 69.2 kg (152 lb 8.9 oz)   Height: 5' 6" (1.676 m)          Physical Exam  Vitals reviewed.   Constitutional:       General: She is not in acute distress.  HENT:      Head: Normocephalic and atraumatic.      Mouth/Throat:      Pharynx: Oropharynx is clear.   Eyes:      Conjunctiva/sclera: Conjunctivae normal.   Cardiovascular:      Rate and Rhythm: Normal rate and regular rhythm.      Pulses: Normal pulses.      Heart sounds: Normal heart sounds.   Pulmonary:      Effort: Pulmonary effort is normal. No respiratory distress.      Breath sounds: Normal breath sounds. No wheezing.   Chest:   Breasts:     Right: Normal. No swelling, bleeding, inverted nipple, mass, nipple discharge, skin change or tenderness.      Comments: Left breast with implant/nipple sparing; no lumps/bumps about the chest wall or axilla  Abdominal:      General: Bowel sounds are normal. There is no distension.      Palpations: Abdomen is soft.      Tenderness: There is no abdominal tenderness.   Musculoskeletal:      Cervical back: Neck supple.      Right lower leg: No edema.      Left lower leg: No edema.   Lymphadenopathy:      Cervical: No cervical adenopathy.      Upper Body:      Right upper body: No supraclavicular or axillary adenopathy.      Left upper body: No supraclavicular or axillary adenopathy.      Lower Body: No right inguinal adenopathy. No left inguinal adenopathy.   Skin:     General: Skin is warm and dry.      Findings: No rash.   Neurological:      Mental Status: She is alert and oriented to person, place, and time.   Psychiatric:         Behavior: Behavior normal.         Thought Content: Thought content normal.           Current Outpatient Medications on File Prior to Visit   Medication Sig    biotin 5,000 mcg Subl     calcium carb-vitamin D3-vit K2 500 mg calcium- 200 unit-90 mcg Tab     cholecalciferol, vitamin D3, 250 mcg (10,000 unit) Tab     cyanocobalamin, vitamin B-12, 3,000 mcg Cap Take " by mouth once daily. Triple Blend with folate 680mcg    fluticasone propionate (FLONASE) 50 mcg/actuation nasal spray 1 spray by Each Nostril route as needed.    KRILL OIL ORAL Take 2,000 mg by mouth once daily.    losartan (COZAAR) 25 MG tablet once daily.    rosuvastatin (CRESTOR) 20 MG tablet Take 20 mg by mouth once daily.    vitamin A palmitate 4,500 mcg (15,000 unit) Tab     zinc gluconate 50 mg tablet Take 25 mg by mouth once daily.     Current Facility-Administered Medications on File Prior to Visit   Medication    0.9%  NaCl infusion     Lab Results   Component Value Date    WBC 6.34 04/28/2025    HGB 13.7 04/28/2025    HCT 40.9 04/28/2025    MCV 91 04/28/2025     04/28/2025       CMP  Sodium   Date Value Ref Range Status   04/28/2025 141 136 - 145 mmol/L Final   09/13/2023 139 136 - 145 mmol/L Final     Potassium   Date Value Ref Range Status   04/28/2025 4.1 3.5 - 5.1 mmol/L Final   09/13/2023 4.7 3.5 - 5.1 mmol/L Final     Comment:     Anion Gap reference range revised on 4/28/2023     Chloride   Date Value Ref Range Status   04/28/2025 110 95 - 110 mmol/L Final   09/13/2023 106 95 - 110 mmol/L Final     CO2   Date Value Ref Range Status   04/28/2025 23 23 - 29 mmol/L Final   09/13/2023 25 22 - 31 mmol/L Final     Glucose   Date Value Ref Range Status   04/28/2025 112 (H) 70 - 110 mg/dL Final   09/13/2023 106 70 - 110 mg/dL Final     Comment:     The ADA recommends the following guidelines for fasting glucose:    Normal:       less than 100 mg/dL    Prediabetes:  100 mg/dL to 125 mg/dL    Diabetes:     126 mg/dL or higher       BUN   Date Value Ref Range Status   04/28/2025 22 8 - 23 mg/dL Final     Creatinine   Date Value Ref Range Status   04/28/2025 0.9 0.5 - 1.4 mg/dL Final     Calcium   Date Value Ref Range Status   04/28/2025 9.9 8.7 - 10.5 mg/dL Final   09/13/2023 9.4 8.4 - 10.2 mg/dL Final     Protein Total   Date Value Ref Range Status   04/28/2025 7.6 6.0 - 8.4 gm/dL Final     Total  Protein   Date Value Ref Range Status   09/13/2023 6.8 6.0 - 8.4 g/dL Final     Albumin   Date Value Ref Range Status   04/28/2025 4.3 3.5 - 5.2 g/dL Final   09/13/2023 4.2 3.5 - 5.2 g/dL Final     Total Bilirubin   Date Value Ref Range Status   09/13/2023 0.5 0.2 - 1.3 mg/dL Final     Bilirubin Total   Date Value Ref Range Status   04/28/2025 0.5 0.1 - 1.0 mg/dL Final     Comment:     For infants and newborns, interpretation of results should be based   on gestational age, weight and in agreement with clinical   observations.    Premature Infant recommended reference ranges:   0-24 hours:  <8.0 mg/dL   24-48 hours: <12.0 mg/dL   3-5 days:    <15.0 mg/dL   6-29 days:   <15.0 mg/dL     Alkaline Phosphatase   Date Value Ref Range Status   09/13/2023 56 38 - 145 U/L Final     ALP   Date Value Ref Range Status   04/28/2025 54 40 - 150 unit/L Final     AST ([place])   Date Value Ref Range Status   12/28/2015 41 (H) 14 - 36 U/L Final     AST   Date Value Ref Range Status   04/28/2025 22 11 - 45 unit/L Final   09/13/2023 40 (H) 14 - 36 U/L Final     ALT   Date Value Ref Range Status   04/28/2025 22 10 - 44 unit/L Final   09/13/2023 40 (H) 0 - 35 U/L Final     Anion Gap   Date Value Ref Range Status   04/28/2025 8 8 - 16 mmol/L Final     eGFR   Date Value Ref Range Status   04/28/2025 >60 >60 mL/min/1.73/m2 Final     Comment:     Estimated GFR calculated using the CKD-EPI creatinine (2021) equation.   09/13/2023 >60 >60 mL/min/1.73 m^2 Final         11/07/24  Mammo Digital Screening Right with Seferino  Narrative: Facility:  [address] [id number] [phone]    Name: Margaret Rouse    MRN: [id number]    Result:   Mammo Digital Screening Right with Seferino     History:  Patient is 74 y.o. and is seen for screening. The patient is status post   left mastectomy.    Films Compared:  Compared to: 11/07/2023 Mammo Digital Diagnostic Right with Seferino,    04/20/2023 Mammo Breast Specimen, 02/09/2023 Mammo Digital Diagnostic   Bilat with Seferino, and 02/07/2023 Mammo Digital Screening Bilat w/ Seferino     Findings:  This procedure was performed using tomosynthesis. Computer-aided detection   was utilized in the interpretation of this examination.    There are scattered areas of fibroglandular density.     There are stable benign calcifications.    There is no evidence of suspicious masses, calcifications, or other   abnormal findings in the right breast.  Impression: There is no mammographic evidence of malignancy in the right breast.    BI-RADS Category:   Overall: 2 - Benign       Recommendation:  Routine screening mammogram in 1 year is recommended.    Eliana Nguyễn MD    Patient information entered into a reminder system with a target due date   for the above recommendation.            Assessment:       1. Invasive ductal carcinoma of left breast in female         Plan:     Invasive ductal carcinoma of left breast in female       invasive ductal carcinoma of the left breast  -no indication for neoadjuvant chemo, no indication for further imaging, no indication for oncotype especially with possible T1a or T1b IDC and age >70's   -ER: 5.4%+, CA: negative, HER-2 non assess initially then HER-2  +after IDC  was found  -significant discussion about adjuvant chemo vs endocrine but given her low ER/CA positivity, HER-2 therapy might be her only way for preventing this cancer from coming back  -Last DEXA scan  in 2016 showed osteopenia   -decision to move forward with TH; began 5/22/23  -Echo 5/11/23 with EF 65%; next scheduled for 8/14/23  Completed 12 weeks of TH (08/07/23), proceed with 2 additional Herceptin maintenance doses before stopping on 09/05/23.  11/26/24:  DIANA @ 19 months post treatment; f/u in 4 months with CBC, CMP prior.  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      Route Chart for Scheduling    Med Onc Chart Routing      Follow up with physician 4 months. f/u in 4 months wtih Dr. Hernandez - ramses, cmp prior   Follow up with SHADI    Infusion scheduling note    Injection scheduling note    Labs    Imaging    Pharmacy appointment    Other referrals                      Answers submitted by the patient for this visit:  Review of Systems Questionnaire (Submitted on 4/28/2025)  appetite change : No  unexpected weight change: No  mouth sores: No

## 2025-08-27 ENCOUNTER — LAB VISIT (OUTPATIENT)
Dept: LAB | Facility: HOSPITAL | Age: 75
End: 2025-08-27
Attending: INTERNAL MEDICINE
Payer: MEDICARE

## 2025-08-27 ENCOUNTER — OFFICE VISIT (OUTPATIENT)
Dept: HEMATOLOGY/ONCOLOGY | Facility: CLINIC | Age: 75
End: 2025-08-27
Payer: MEDICARE

## 2025-08-27 VITALS
BODY MASS INDEX: 24.55 KG/M2 | HEIGHT: 66 IN | DIASTOLIC BLOOD PRESSURE: 80 MMHG | HEART RATE: 80 BPM | SYSTOLIC BLOOD PRESSURE: 135 MMHG | RESPIRATION RATE: 18 BRPM | WEIGHT: 152.75 LBS | OXYGEN SATURATION: 97 % | TEMPERATURE: 97 F

## 2025-08-27 DIAGNOSIS — C50.912 INVASIVE DUCTAL CARCINOMA OF LEFT BREAST IN FEMALE: ICD-10-CM

## 2025-08-27 DIAGNOSIS — C50.912 INVASIVE DUCTAL CARCINOMA OF LEFT BREAST IN FEMALE: Primary | ICD-10-CM

## 2025-08-27 DIAGNOSIS — D05.12 DUCTAL CARCINOMA IN SITU (DCIS) OF LEFT BREAST: ICD-10-CM

## 2025-08-27 LAB
ABSOLUTE EOSINOPHIL (OHS): 0.46 K/UL
ABSOLUTE MONOCYTE (OHS): 0.67 K/UL (ref 0.3–1)
ABSOLUTE NEUTROPHIL COUNT (OHS): 2.98 K/UL (ref 1.8–7.7)
ALBUMIN SERPL BCP-MCNC: 4 G/DL (ref 3.5–5.2)
ALP SERPL-CCNC: 58 UNIT/L (ref 40–150)
ALT SERPL W/O P-5'-P-CCNC: 28 UNIT/L (ref 10–44)
ANION GAP (OHS): 10 MMOL/L (ref 8–16)
AST SERPL-CCNC: 26 UNIT/L (ref 11–45)
BASOPHILS # BLD AUTO: 0.06 K/UL
BASOPHILS NFR BLD AUTO: 0.8 %
BILIRUB SERPL-MCNC: 0.6 MG/DL (ref 0.1–1)
BUN SERPL-MCNC: 25 MG/DL (ref 8–23)
CALCIUM SERPL-MCNC: 9.3 MG/DL (ref 8.7–10.5)
CHLORIDE SERPL-SCNC: 110 MMOL/L (ref 95–110)
CO2 SERPL-SCNC: 22 MMOL/L (ref 23–29)
CREAT SERPL-MCNC: 0.8 MG/DL (ref 0.5–1.4)
ERYTHROCYTE [DISTWIDTH] IN BLOOD BY AUTOMATED COUNT: 12.3 % (ref 11.5–14.5)
GFR SERPLBLD CREATININE-BSD FMLA CKD-EPI: >60 ML/MIN/1.73/M2
GLUCOSE SERPL-MCNC: 94 MG/DL (ref 70–110)
HCT VFR BLD AUTO: 40.8 % (ref 37–48.5)
HGB BLD-MCNC: 13.6 GM/DL (ref 12–16)
IMM GRANULOCYTES # BLD AUTO: 0.01 K/UL (ref 0–0.04)
IMM GRANULOCYTES NFR BLD AUTO: 0.1 % (ref 0–0.5)
LYMPHOCYTES # BLD AUTO: 3.06 K/UL (ref 1–4.8)
MCH RBC QN AUTO: 30.5 PG (ref 27–31)
MCHC RBC AUTO-ENTMCNC: 33.3 G/DL (ref 32–36)
MCV RBC AUTO: 92 FL (ref 82–98)
NUCLEATED RBC (/100WBC) (OHS): 0 /100 WBC
PLATELET # BLD AUTO: 172 K/UL (ref 150–450)
PMV BLD AUTO: 9.4 FL (ref 9.2–12.9)
POTASSIUM SERPL-SCNC: 4 MMOL/L (ref 3.5–5.1)
PROT SERPL-MCNC: 7.2 GM/DL (ref 6–8.4)
RBC # BLD AUTO: 4.46 M/UL (ref 4–5.4)
RELATIVE EOSINOPHIL (OHS): 6.4 %
RELATIVE LYMPHOCYTE (OHS): 42.3 % (ref 18–48)
RELATIVE MONOCYTE (OHS): 9.3 % (ref 4–15)
RELATIVE NEUTROPHIL (OHS): 41.1 % (ref 38–73)
SODIUM SERPL-SCNC: 142 MMOL/L (ref 136–145)
WBC # BLD AUTO: 7.24 K/UL (ref 3.9–12.7)

## 2025-08-27 PROCEDURE — 99213 OFFICE O/P EST LOW 20 MIN: CPT | Mod: PBBFAC,PN | Performed by: INTERNAL MEDICINE

## 2025-08-27 PROCEDURE — 36415 COLL VENOUS BLD VENIPUNCTURE: CPT | Mod: PN

## 2025-08-27 PROCEDURE — 85025 COMPLETE CBC W/AUTO DIFF WBC: CPT | Mod: PN

## 2025-08-27 PROCEDURE — 99999 PR PBB SHADOW E&M-EST. PATIENT-LVL III: CPT | Mod: PBBFAC,,, | Performed by: INTERNAL MEDICINE

## 2025-08-27 PROCEDURE — 84295 ASSAY OF SERUM SODIUM: CPT | Mod: PN

## 2025-08-27 RX ORDER — TRIAMCINOLONE ACETONIDE 1 MG/G
CREAM TOPICAL
COMMUNITY
Start: 2025-07-21